# Patient Record
Sex: FEMALE | Race: WHITE | NOT HISPANIC OR LATINO
[De-identification: names, ages, dates, MRNs, and addresses within clinical notes are randomized per-mention and may not be internally consistent; named-entity substitution may affect disease eponyms.]

---

## 2017-01-09 ENCOUNTER — TRANSCRIPTION ENCOUNTER (OUTPATIENT)
Age: 67
End: 2017-01-09

## 2017-01-28 ENCOUNTER — EMERGENCY (EMERGENCY)
Facility: HOSPITAL | Age: 67
LOS: 1 days | Discharge: HOME | End: 2017-01-28
Attending: EMERGENCY MEDICINE | Admitting: EMERGENCY MEDICINE
Payer: MEDICAID

## 2017-01-28 VITALS
OXYGEN SATURATION: 100 % | HEART RATE: 78 BPM | SYSTOLIC BLOOD PRESSURE: 139 MMHG | DIASTOLIC BLOOD PRESSURE: 74 MMHG | TEMPERATURE: 98 F | RESPIRATION RATE: 16 BRPM

## 2017-01-28 LAB
APTT BLD: 30.1 SEC — SIGNIFICANT CHANGE UP (ref 27.5–37.4)
BASOPHILS # BLD AUTO: 0.05 K/UL — SIGNIFICANT CHANGE UP (ref 0–0.2)
BASOPHILS NFR BLD AUTO: 0.5 % — SIGNIFICANT CHANGE UP (ref 0–2)
EOSINOPHIL # BLD AUTO: 0.64 K/UL — HIGH (ref 0–0.5)
EOSINOPHIL NFR BLD AUTO: 6 % — SIGNIFICANT CHANGE UP (ref 0–6)
HCT VFR BLD CALC: 35.9 % — SIGNIFICANT CHANGE UP (ref 34.5–45)
HGB BLD-MCNC: 11.9 G/DL — SIGNIFICANT CHANGE UP (ref 11.5–15.5)
IMM GRANULOCYTES NFR BLD AUTO: 0.3 % — SIGNIFICANT CHANGE UP (ref 0–1.5)
INR BLD: 0.97 — SIGNIFICANT CHANGE UP (ref 0.87–1.18)
LYMPHOCYTES # BLD AUTO: 2.59 K/UL — SIGNIFICANT CHANGE UP (ref 1–3.3)
LYMPHOCYTES # BLD AUTO: 24.2 % — SIGNIFICANT CHANGE UP (ref 13–44)
MCHC RBC-ENTMCNC: 27.9 PG — SIGNIFICANT CHANGE UP (ref 27–34)
MCHC RBC-ENTMCNC: 33.1 % — SIGNIFICANT CHANGE UP (ref 32–36)
MCV RBC AUTO: 84.1 FL — SIGNIFICANT CHANGE UP (ref 80–100)
MONOCYTES # BLD AUTO: 0.89 K/UL — SIGNIFICANT CHANGE UP (ref 0–0.9)
MONOCYTES NFR BLD AUTO: 8.3 % — SIGNIFICANT CHANGE UP (ref 2–14)
NEUTROPHILS # BLD AUTO: 6.52 K/UL — SIGNIFICANT CHANGE UP (ref 1.8–7.4)
NEUTROPHILS NFR BLD AUTO: 60.7 % — SIGNIFICANT CHANGE UP (ref 43–77)
PLATELET # BLD AUTO: 300 K/UL — SIGNIFICANT CHANGE UP (ref 150–400)
PMV BLD: 10.2 FL — SIGNIFICANT CHANGE UP (ref 7–13)
PROTHROM AB SERPL-ACNC: 11 SEC — SIGNIFICANT CHANGE UP (ref 10–13.1)
RBC # BLD: 4.27 M/UL — SIGNIFICANT CHANGE UP (ref 3.8–5.2)
RBC # FLD: 13.3 % — SIGNIFICANT CHANGE UP (ref 10.3–14.5)
WBC # BLD: 10.72 K/UL — HIGH (ref 3.8–10.5)
WBC # FLD AUTO: 10.72 K/UL — HIGH (ref 3.8–10.5)

## 2017-01-28 PROCEDURE — 99285 EMERGENCY DEPT VISIT HI MDM: CPT | Mod: 25

## 2017-01-28 PROCEDURE — 93010 ELECTROCARDIOGRAM REPORT: CPT

## 2017-01-28 RX ORDER — ASPIRIN/CALCIUM CARB/MAGNESIUM 324 MG
325 TABLET ORAL ONCE
Qty: 0 | Refills: 0 | Status: COMPLETED | OUTPATIENT
Start: 2017-01-28 | End: 2017-01-28

## 2017-01-28 RX ADMIN — Medication 325 MILLIGRAM(S): at 23:55

## 2017-01-28 NOTE — ED ADULT NURSE NOTE - OBJECTIVE STATEMENT
65 yo female received in 29.  Pt American speaking.  MD Santiago used  phone with patient.  Pt is A&Ox4.  Pt co of b/l LE swelling and pain beginning on12/18/16.  Pt has been seen at urgent care and ed.  Was put on ABX @ ed and completed prescription.  states pain is constant and does not occur with ambulation.  Pt states increase in difficulty ambulating recently.  Pt appears to ambulate well and ambulated to bed from intake triage.  Pt poor historian of med hx.  Pt has 18g L ac.

## 2017-01-28 NOTE — ED PROVIDER NOTE - MEDICAL DECISION MAKING DETAILS
66F with b/l LE swelling associated with sob, fever, cp. Ddx: dvt/pe, acs, chf, cellulitis, venous insufficiency. Plan: labs, blood cx, ua, ekg, cxr, aspirin, le duplex, reassess.

## 2017-01-28 NOTE — ED PROVIDER NOTE - CARE PLAN
Principal Discharge DX:	Cellulitis of lower extremity  Instructions for follow-up, activity and diet:	-- Follow up with your primary doctor within 2-3 days. Bring a copy of your lab results from today.   -- Please fill the prescription for the antibiotics and take as directed. The prescription is waiting for you at your pharmacy. Please finish the entire course of medication as prescribed.  If you have any belly pain after the antibiotics, yogurt has been shown to help with this.  Do not use any alcohol or grapefruit juice with any antibiotics.  -- See referral list to see a dermatologist in 1-2 weeks.  -- Return to ER immediately for new or worsening symptoms (including but not limited to: worsening pain, fevers, worsening swelling/redness of legs, chest pain or shortness of breath) or for any concerns.  Secondary Diagnosis:	UTI (urinary tract infection)

## 2017-01-28 NOTE — ED PROVIDER NOTE - ATTENDING CONTRIBUTION TO CARE
I have seen and examined the patient face to face, have reviewed and addended the HPI, PE and a/p as necessary. ( see hpi pe and mdm " att" )   65 yo F wit h/o HTN DM here with lower extremity edema itching and erythema. did have subjective fever. mild sob, intermittent cp . no ho pe or dvt. was recently on kelfex no improvement of legs.   att exam: patient awake alert NAD . LUNGS CTAB no wheeze no crackle. CARD RRR no m/r/g.  Abdomen soft NT ND no rebound no guarding no CVA tenderness. EXT WWP no edema no calf tenderness mild bilat leg erythema.  CV 2+DP/PT bilaterally. nuero A&Ox3 gait normal.  skin warm and dry no rash   MDM r/o dvt, chf, cxr ekg labs doppler. reassess.   Cirilli

## 2017-01-28 NOTE — ED PROVIDER NOTE - OBJECTIVE STATEMENT
66F h/o htn, dm p/w b/l LE edema x1 month. The legs are painful, associated with SOB (at rest and with exertion) and intermittent CP, sometimes worse with dep inspiration, and intermittent fevers. Pt seen at outside ED, given 1 week course of keflex which caused itching but did not improve symptoms. No hx of DVT/PE.   Hx obtained via phone  #[id number] 66F h/o htn, dm p/w b/l LE edema x1 month. The legs are painful, associated with SOB (at rest and with exertion) and intermittent CP, sometimes worse with deep inspiration, and intermittent fevers. Pt seen at outside ED, given 1 week course of keflex which caused itching but did not improve symptoms. No hx of DVT/PE. +urinary frequency. No cough, vomiting, diarrhea, or dysuria.  Hx obtained via phone  #[phone] 66F h/o htn, dm p/w b/l LE edema x1 month. The legs are painful, associated with SOB (at rest and with exertion) and intermittent CP, sometimes worse with deep inspiration, and intermittent fevers. Pt seen at outside ED, given 1 week course of keflex which caused itching but did not improve symptoms. No hx of DVT/PE. +urinary frequency. No cough, vomiting, diarrhea, or dysuria.  Pt speaks some English but prefers [place] Zealander. Hx obtained via phone  #[phone]

## 2017-01-28 NOTE — ED PROVIDER NOTE - PROGRESS NOTE DETAILS
LE duplex neg. Do not suspect PE (LE swelling is bilaterally symmetric, Well's = 0). Pt c/o itching of legs and feels that she has bites on legs and hands, though no bites/burrows seen, will give dermatology follow up. LE duplex neg. Do not suspect PE (LE swelling is bilaterally symmetric, Well's = 0). Pt c/o itching of legs and feels that she has bites on legs and hands, though no bites/burrows seen, will give dermatology follow up. Pt with normal knee xrays from [facility] 10 days ago - read official report. Will not pursue xrays at this time.

## 2017-01-28 NOTE — ED PROVIDER NOTE - CONDUCTED A DETAILED DISCUSSION WITH PATIENT AND/OR GUARDIAN REGARDING, MDM
lab results/return to ED if symptoms worsen, persist or questions arise/need for outpatient follow-up/radiology results

## 2017-01-28 NOTE — ED ADULT TRIAGE NOTE - CHIEF COMPLAINT QUOTE
pt with a c/o bilateral LLE edema and sob , with difficulty ambulating. pt denies fever and chest pain. F/S 278  pmhx HTN DM

## 2017-01-28 NOTE — ED PROVIDER NOTE - PLAN OF CARE
-- Follow up with your primary doctor within 2-3 days. Bring a copy of your lab results from today.   -- Please fill the prescription for the antibiotics and take as directed. The prescription is waiting for you at your pharmacy. Please finish the entire course of medication as prescribed.  If you have any belly pain after the antibiotics, yogurt has been shown to help with this.  Do not use any alcohol or grapefruit juice with any antibiotics.  -- See referral list to see a dermatologist in 1-2 weeks.  -- Return to ER immediately for new or worsening symptoms (including but not limited to: worsening pain, fevers, worsening swelling/redness of legs, chest pain or shortness of breath) or for any concerns.

## 2017-01-29 VITALS
OXYGEN SATURATION: 100 % | HEART RATE: 68 BPM | DIASTOLIC BLOOD PRESSURE: 79 MMHG | RESPIRATION RATE: 18 BRPM | SYSTOLIC BLOOD PRESSURE: 161 MMHG

## 2017-01-29 LAB
ALBUMIN SERPL ELPH-MCNC: 3.9 G/DL — SIGNIFICANT CHANGE UP (ref 3.3–5)
ALP SERPL-CCNC: 51 U/L — SIGNIFICANT CHANGE UP (ref 40–120)
ALT FLD-CCNC: 19 U/L — SIGNIFICANT CHANGE UP (ref 4–33)
APPEARANCE UR: CLEAR — SIGNIFICANT CHANGE UP
AST SERPL-CCNC: 20 U/L — SIGNIFICANT CHANGE UP (ref 4–32)
BACTERIA # UR AUTO: SIGNIFICANT CHANGE UP
BILIRUB SERPL-MCNC: 0.2 MG/DL — SIGNIFICANT CHANGE UP (ref 0.2–1.2)
BILIRUB UR-MCNC: NEGATIVE — SIGNIFICANT CHANGE UP
BLOOD UR QL VISUAL: NEGATIVE — SIGNIFICANT CHANGE UP
BUN SERPL-MCNC: 22 MG/DL — SIGNIFICANT CHANGE UP (ref 7–23)
CALCIUM SERPL-MCNC: 9.5 MG/DL — SIGNIFICANT CHANGE UP (ref 8.4–10.5)
CHLORIDE SERPL-SCNC: 99 MMOL/L — SIGNIFICANT CHANGE UP (ref 98–107)
CK MB BLD-MCNC: 4.69 NG/ML — SIGNIFICANT CHANGE UP (ref 1–4.7)
CK SERPL-CCNC: 197 U/L — HIGH (ref 25–170)
CO2 SERPL-SCNC: 25 MMOL/L — SIGNIFICANT CHANGE UP (ref 22–31)
COLOR SPEC: SIGNIFICANT CHANGE UP
CREAT SERPL-MCNC: 0.74 MG/DL — SIGNIFICANT CHANGE UP (ref 0.5–1.3)
GLUCOSE SERPL-MCNC: 310 MG/DL — HIGH (ref 70–99)
GLUCOSE UR-MCNC: >1000 — SIGNIFICANT CHANGE UP
KETONES UR-MCNC: NEGATIVE — SIGNIFICANT CHANGE UP
LEUKOCYTE ESTERASE UR-ACNC: HIGH
MUCOUS THREADS # UR AUTO: SIGNIFICANT CHANGE UP
NITRITE UR-MCNC: NEGATIVE — SIGNIFICANT CHANGE UP
PH UR: 6 — SIGNIFICANT CHANGE UP (ref 4.6–8)
POTASSIUM SERPL-MCNC: 4.4 MMOL/L — SIGNIFICANT CHANGE UP (ref 3.5–5.3)
POTASSIUM SERPL-SCNC: 4.4 MMOL/L — SIGNIFICANT CHANGE UP (ref 3.5–5.3)
PROT SERPL-MCNC: 7 G/DL — SIGNIFICANT CHANGE UP (ref 6–8.3)
PROT UR-MCNC: NEGATIVE — SIGNIFICANT CHANGE UP
RBC CASTS # UR COMP ASSIST: HIGH (ref 0–?)
SODIUM SERPL-SCNC: 141 MMOL/L — SIGNIFICANT CHANGE UP (ref 135–145)
SP GR SPEC: 1.04 — HIGH (ref 1–1.03)
SPECIMEN SOURCE: SIGNIFICANT CHANGE UP
SPECIMEN SOURCE: SIGNIFICANT CHANGE UP
SQUAMOUS # UR AUTO: SIGNIFICANT CHANGE UP
TROPONIN T SERPL-MCNC: < 0.06 NG/ML — SIGNIFICANT CHANGE UP (ref 0–0.06)
UROBILINOGEN FLD QL: NORMAL E.U. — SIGNIFICANT CHANGE UP (ref 0.1–0.2)
WBC CLUMPS #/AREA URNS HPF: PRESENT — HIGH (ref 0–?)
WBC UR QL: SIGNIFICANT CHANGE UP (ref 0–?)

## 2017-01-29 PROCEDURE — 71010: CPT | Mod: 26

## 2017-01-29 PROCEDURE — 93970 EXTREMITY STUDY: CPT | Mod: 26

## 2017-01-29 RX ADMIN — Medication 2 TABLET(S): at 02:06

## 2017-01-31 ENCOUNTER — APPOINTMENT (OUTPATIENT)
Dept: DERMATOLOGY | Facility: CLINIC | Age: 67
End: 2017-01-31

## 2017-01-31 VITALS
WEIGHT: 250 LBS | BODY MASS INDEX: 42.16 KG/M2 | HEIGHT: 64.5 IN | SYSTOLIC BLOOD PRESSURE: 122 MMHG | DIASTOLIC BLOOD PRESSURE: 70 MMHG

## 2017-01-31 DIAGNOSIS — I10 ESSENTIAL (PRIMARY) HYPERTENSION: ICD-10-CM

## 2017-01-31 DIAGNOSIS — Z78.9 OTHER SPECIFIED HEALTH STATUS: ICD-10-CM

## 2017-01-31 DIAGNOSIS — L30.9 DERMATITIS, UNSPECIFIED: ICD-10-CM

## 2017-01-31 DIAGNOSIS — Z80.8 FAMILY HISTORY OF MALIGNANT NEOPLASM OF OTHER ORGANS OR SYSTEMS: ICD-10-CM

## 2017-01-31 DIAGNOSIS — I83.11 VARICOSE VEINS OF RIGHT LOWER EXTREMITY WITH INFLAMMATION: ICD-10-CM

## 2017-01-31 DIAGNOSIS — L80 VITILIGO: ICD-10-CM

## 2017-01-31 DIAGNOSIS — E11.9 TYPE 2 DIABETES MELLITUS W/OUT COMPLICATIONS: ICD-10-CM

## 2017-01-31 DIAGNOSIS — I83.12 VARICOSE VEINS OF RIGHT LOWER EXTREMITY WITH INFLAMMATION: ICD-10-CM

## 2017-01-31 DIAGNOSIS — L81.9 DISORDER OF PIGMENTATION, UNSPECIFIED: ICD-10-CM

## 2017-01-31 RX ORDER — LIDOCAINE 5 G/100G
5 OINTMENT TOPICAL
Refills: 0 | Status: ACTIVE | COMMUNITY

## 2017-01-31 RX ORDER — KETOCONAZOLE 20 MG/G
2 CREAM TOPICAL
Qty: 1 | Refills: 2 | Status: ACTIVE | COMMUNITY
Start: 2017-01-31 | End: 1900-01-01

## 2017-01-31 RX ORDER — BACITRACIN 500 [USP'U]/G
OINTMENT OPHTHALMIC
Refills: 0 | Status: ACTIVE | COMMUNITY

## 2017-01-31 RX ORDER — CAMPHOR AND MENTHOL 5; 5 MG/ML; MG/ML
LOTION TOPICAL
Refills: 0 | Status: ACTIVE | COMMUNITY

## 2017-01-31 RX ORDER — HYDROCORTISONE 1 %
12 CREAM (GRAM) TOPICAL
Refills: 0 | Status: ACTIVE | COMMUNITY

## 2017-01-31 RX ORDER — CETIRIZINE HYDROCHLORIDE 10 MG/1
10 TABLET, COATED ORAL
Refills: 0 | Status: ACTIVE | COMMUNITY

## 2017-01-31 RX ORDER — TRIAMCINOLONE ACETONIDE 1 MG/G
0.1 OINTMENT TOPICAL
Qty: 1 | Refills: 0 | Status: ACTIVE | COMMUNITY
Start: 2017-01-31 | End: 1900-01-01

## 2017-02-02 ENCOUNTER — APPOINTMENT (OUTPATIENT)
Dept: DERMATOLOGY | Facility: CLINIC | Age: 67
End: 2017-02-02

## 2017-02-02 LAB
BACTERIA BLD CULT: SIGNIFICANT CHANGE UP
BACTERIA BLD CULT: SIGNIFICANT CHANGE UP

## 2017-02-06 NOTE — ED PROVIDER NOTE - SKIN, MLM
Ejection Fraction...
Skin normal color for race, warm, dry and intact. b/l LE venous insufficiency changes, no obvious cellulitis

## 2017-02-08 ENCOUNTER — APPOINTMENT (OUTPATIENT)
Dept: PHYSICAL MEDICINE AND REHAB | Facility: CLINIC | Age: 67
End: 2017-02-08

## 2017-02-16 ENCOUNTER — EMERGENCY (EMERGENCY)
Facility: HOSPITAL | Age: 67
LOS: 1 days | Discharge: HOME | End: 2017-02-16
Attending: EMERGENCY MEDICINE
Payer: MEDICAID

## 2017-02-16 VITALS
TEMPERATURE: 98 F | HEART RATE: 80 BPM | SYSTOLIC BLOOD PRESSURE: 150 MMHG | RESPIRATION RATE: 18 BRPM | OXYGEN SATURATION: 97 % | DIASTOLIC BLOOD PRESSURE: 74 MMHG

## 2017-02-16 DIAGNOSIS — Z88.0 ALLERGY STATUS TO PENICILLIN: ICD-10-CM

## 2017-02-16 DIAGNOSIS — M62.838 OTHER MUSCLE SPASM: ICD-10-CM

## 2017-02-16 DIAGNOSIS — Z88.1 ALLERGY STATUS TO OTHER ANTIBIOTIC AGENTS STATUS: ICD-10-CM

## 2017-02-16 DIAGNOSIS — M54.9 DORSALGIA, UNSPECIFIED: ICD-10-CM

## 2017-02-16 PROCEDURE — 99283 EMERGENCY DEPT VISIT LOW MDM: CPT

## 2017-02-16 RX ORDER — IBUPROFEN 200 MG
600 TABLET ORAL ONCE
Qty: 0 | Refills: 0 | Status: COMPLETED | OUTPATIENT
Start: 2017-02-16 | End: 2017-02-16

## 2017-02-16 RX ORDER — IBUPROFEN 200 MG
1 TABLET ORAL
Qty: 28 | Refills: 0 | OUTPATIENT
Start: 2017-02-16 | End: 2017-02-23

## 2017-02-16 RX ORDER — METHOCARBAMOL 500 MG/1
1 TABLET, FILM COATED ORAL
Qty: 21 | Refills: 0 | OUTPATIENT
Start: 2017-02-16 | End: 2017-02-23

## 2017-02-16 RX ORDER — METHOCARBAMOL 500 MG/1
750 TABLET, FILM COATED ORAL ONCE
Qty: 0 | Refills: 0 | Status: COMPLETED | OUTPATIENT
Start: 2017-02-16 | End: 2017-02-16

## 2017-02-16 RX ADMIN — Medication 600 MILLIGRAM(S): at 15:01

## 2017-02-16 RX ADMIN — METHOCARBAMOL 750 MILLIGRAM(S): 500 TABLET, FILM COATED ORAL at 14:30

## 2017-02-16 RX ADMIN — Medication 600 MILLIGRAM(S): at 14:31

## 2017-02-16 NOTE — ED PROVIDER NOTE - CHIEF COMPLAINT
The patient is a 66y Female complaining of pain, lower leg.no ppt factors co of recurrent lower back pain/muscle spasm.

## 2017-03-01 ENCOUNTER — EMERGENCY (EMERGENCY)
Facility: HOSPITAL | Age: 67
LOS: 1 days | Discharge: HOME | End: 2017-03-01
Attending: EMERGENCY MEDICINE
Payer: MEDICAID

## 2017-03-01 VITALS
TEMPERATURE: 98 F | SYSTOLIC BLOOD PRESSURE: 110 MMHG | DIASTOLIC BLOOD PRESSURE: 70 MMHG | RESPIRATION RATE: 17 BRPM | OXYGEN SATURATION: 98 % | HEART RATE: 82 BPM

## 2017-03-01 VITALS
DIASTOLIC BLOOD PRESSURE: 68 MMHG | TEMPERATURE: 98 F | HEIGHT: 68 IN | SYSTOLIC BLOOD PRESSURE: 102 MMHG | RESPIRATION RATE: 18 BRPM | HEART RATE: 80 BPM | WEIGHT: 169.98 LBS | OXYGEN SATURATION: 97 %
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
## 2017-03-01 DIAGNOSIS — M25.531 PAIN IN RIGHT WRIST: ICD-10-CM

## 2017-03-01 DIAGNOSIS — M25.532 PAIN IN LEFT WRIST: ICD-10-CM

## 2017-03-01 DIAGNOSIS — I10 ESSENTIAL (PRIMARY) HYPERTENSION: ICD-10-CM
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
## 2017-03-01 DIAGNOSIS — E11.9 TYPE 2 DIABETES MELLITUS WITHOUT COMPLICATIONS: ICD-10-CM

## 2017-03-01 DIAGNOSIS — M25.561 PAIN IN RIGHT KNEE: ICD-10-CM

## 2017-03-01 DIAGNOSIS — M25.551 PAIN IN RIGHT HIP: ICD-10-CM

## 2017-03-01 PROCEDURE — 73562 X-RAY EXAM OF KNEE 3: CPT

## 2017-03-01 PROCEDURE — 73502 X-RAY EXAM HIP UNI 2-3 VIEWS: CPT | Mod: 26,RT

## 2017-03-01 PROCEDURE — 73502 X-RAY EXAM HIP UNI 2-3 VIEWS: CPT

## 2017-03-01 PROCEDURE — 99284 EMERGENCY DEPT VISIT MOD MDM: CPT

## 2017-03-01 PROCEDURE — 73110 X-RAY EXAM OF WRIST: CPT | Mod: 26,50

## 2017-03-01 PROCEDURE — 73562 X-RAY EXAM OF KNEE 3: CPT | Mod: 26,RT

## 2017-03-01 PROCEDURE — 73110 X-RAY EXAM OF WRIST: CPT

## 2017-03-01 RX ORDER — ACETAMINOPHEN 500 MG
975 TABLET ORAL ONCE
Qty: 0 | Refills: 0 | Status: COMPLETED | OUTPATIENT
Start: 2017-03-01 | End: 2017-03-01

## 2017-03-01 RX ORDER — IBUPROFEN 200 MG
600 TABLET ORAL ONCE
Qty: 0 | Refills: 0 | Status: COMPLETED | OUTPATIENT
Start: 2017-03-01 | End: 2017-03-01

## 2017-03-01 RX ADMIN — Medication 600 MILLIGRAM(S): at 22:38

## 2017-03-01 RX ADMIN — Medication 975 MILLIGRAM(S): at 22:38

## 2017-03-01 NOTE — ED PROVIDER NOTE - MEDICAL DECISION MAKING DETAILS
66 YOF BIBA s/p mech fall. analgesia, radiographs. 66 [facility]. no obvious acute traumatic injuries on radiographs. discussed with pt and family that they will be contacted if there are any discrepancies from the radiologist read. Pt able to ambulate without assistance. discussed progression of pain over the next few days and expectant management. Pt is well appearing, stable for discharge and follow up without fail with medical doctor. I had a detailed discussion with the patient and/or guardian regarding the historical points, exam findings, and any diagnostic results supporting the discharge diagnosis. Pt educated on care and need for follow up. Strict return instructions and red flag signs and symptoms discussed with patient. Medications for discharge discussed and adverse effects reviewed. Questions answered. Pt shows understanding of discharge information and agrees to follow.

## 2017-03-01 NOTE — ED PROVIDER NOTE - CARE PLAN
Principal Discharge DX:	Fall, initial encounter  Secondary Diagnosis:	Wrist pain, acute, left  Secondary Diagnosis:	Wrist pain, acute, right

## 2017-03-01 NOTE — ED PROVIDER NOTE - OBJECTIVE STATEMENT
66 YOF BIBA s/p trip and fall while walking this evening. states that she fell forward. States broke her fall with B hands and then fell onto the R knee and Hip. Denies head trauma. denies LOC 66 YOF BIBA s/p trip and fall while walking this evening. states that she fell forward. States broke her fall with B hands and then fell onto the R knee and Hip. Denies head trauma. denies LOC, CP, sob, abd pain, n/v/d. Interpretation done by adult son

## 2017-03-01 NOTE — ED PROVIDER NOTE - PHYSICAL EXAMINATION
GEN: WD, WN, NAD. Non-toxic appearance, obese, GCS: 15  HEENT: NC, AT, MMM, External ears normal, nares patent no drainage,  PERRLA, EOMI, conjunctiva clear, no scleral icterus, No khan's sign, No Raccoon eyes  NECK: FROM, No meningismus, Trachea midline, No vertebral tenderness  CV: Regular rate, regular rhythm. Normal S1/2. No M/R/G  PULM: Normal respiratory effort. CTA-B. No C/W/R, No chest wall tenderness  ABD: soft, ND, NT, no pulsatile masses  SKIN: Warm/dry, no rash, no lacerations, no abrasions, no ecchymosis  BACK: No midline vertebral tenderness, no CVA tenderness  MSK/EXT: FROM x4. distal pulses intact and palpable, No deformities/effusions, No edema/cyanosis, L proximal palm with punctate ecchymosis, No snuff box tenderness B, B wrist mild diffuse tenderness. R Knee: tender infra patellar, no ligamentous laxity, R greater troch tender. no limb length discrepancy.  NEURO: A&O, CN intact, GALDAMEZ, equal and appropriate strength, SILTx4, no focal deficits noted, able to stand and ambulate  PSYCH: Appropriate Affect, judgment, mood, and insight

## 2017-03-14 ENCOUNTER — APPOINTMENT (OUTPATIENT)
Dept: DERMATOLOGY | Facility: CLINIC | Age: 67
End: 2017-03-14

## 2017-03-14 ENCOUNTER — INPATIENT (INPATIENT)
Facility: HOSPITAL | Age: 67
LOS: 2 days | Discharge: HOME HEALTH | DRG: 73 | End: 2017-03-17
Attending: GENERAL PRACTICE | Admitting: GENERAL PRACTICE
Payer: MEDICAID

## 2017-03-14 ENCOUNTER — TRANSCRIPTION ENCOUNTER (OUTPATIENT)
Age: 67
End: 2017-03-14

## 2017-03-14 VITALS
HEART RATE: 86 BPM | SYSTOLIC BLOOD PRESSURE: 142 MMHG | WEIGHT: 199.96 LBS | RESPIRATION RATE: 16 BRPM | OXYGEN SATURATION: 98 % | HEIGHT: 67 IN | TEMPERATURE: 98 F | DIASTOLIC BLOOD PRESSURE: 90 MMHG

## 2017-03-14 DIAGNOSIS — E11.9 TYPE 2 DIABETES MELLITUS WITHOUT COMPLICATIONS: ICD-10-CM

## 2017-03-14 DIAGNOSIS — I10 ESSENTIAL (PRIMARY) HYPERTENSION: ICD-10-CM

## 2017-03-14 DIAGNOSIS — I24.9 ACUTE ISCHEMIC HEART DISEASE, UNSPECIFIED: ICD-10-CM

## 2017-03-14 DIAGNOSIS — R07.9 CHEST PAIN, UNSPECIFIED: ICD-10-CM

## 2017-03-14 DIAGNOSIS — M79.606 PAIN IN LEG, UNSPECIFIED: ICD-10-CM

## 2017-03-14 DIAGNOSIS — I82.409 ACUTE EMBOLISM AND THROMBOSIS OF UNSPECIFIED DEEP VEINS OF UNSPECIFIED LOWER EXTREMITY: ICD-10-CM

## 2017-03-14 LAB
ALBUMIN SERPL ELPH-MCNC: 3 G/DL — LOW (ref 3.5–5)
ALP SERPL-CCNC: 57 U/L — SIGNIFICANT CHANGE UP (ref 40–120)
ALT FLD-CCNC: 27 U/L DA — SIGNIFICANT CHANGE UP (ref 10–60)
ANION GAP SERPL CALC-SCNC: 8 MMOL/L — SIGNIFICANT CHANGE UP (ref 5–17)
APTT BLD: 32.8 SEC — SIGNIFICANT CHANGE UP (ref 27.5–37.4)
AST SERPL-CCNC: 17 U/L — SIGNIFICANT CHANGE UP (ref 10–40)
BASOPHILS # BLD AUTO: 0.1 K/UL — SIGNIFICANT CHANGE UP (ref 0–0.2)
BASOPHILS NFR BLD AUTO: 0.9 % — SIGNIFICANT CHANGE UP (ref 0–2)
BILIRUB SERPL-MCNC: 0.2 MG/DL — SIGNIFICANT CHANGE UP (ref 0.2–1.2)
BUN SERPL-MCNC: 19 MG/DL — HIGH (ref 7–18)
CALCIUM SERPL-MCNC: 8.4 MG/DL — SIGNIFICANT CHANGE UP (ref 8.4–10.5)
CHLORIDE SERPL-SCNC: 107 MMOL/L — SIGNIFICANT CHANGE UP (ref 96–108)
CHOLEST SERPL-MCNC: 126 MG/DL — SIGNIFICANT CHANGE UP (ref 10–199)
CK MB BLD-MCNC: 1.6 % — SIGNIFICANT CHANGE UP (ref 0–3.5)
CK MB CFR SERPL CALC: 3.3 NG/ML — SIGNIFICANT CHANGE UP (ref 0–3.6)
CK SERPL-CCNC: 203 U/L — SIGNIFICANT CHANGE UP (ref 21–215)
CK SERPL-CCNC: 230 U/L — HIGH (ref 21–215)
CO2 SERPL-SCNC: 26 MMOL/L — SIGNIFICANT CHANGE UP (ref 22–31)
CREAT SERPL-MCNC: 0.61 MG/DL — SIGNIFICANT CHANGE UP (ref 0.5–1.3)
D DIMER BLD IA.RAPID-MCNC: 188 NG/ML DDU — SIGNIFICANT CHANGE UP
EOSINOPHIL # BLD AUTO: 0.9 K/UL — HIGH (ref 0–0.5)
EOSINOPHIL NFR BLD AUTO: 8.3 % — HIGH (ref 0–6)
GLUCOSE SERPL-MCNC: 225 MG/DL — HIGH (ref 70–99)
HBA1C BLD-MCNC: 9.7 % — HIGH (ref 4–5.6)
HCT VFR BLD CALC: 32.8 % — LOW (ref 34.5–45)
HDLC SERPL-MCNC: 55 MG/DL — SIGNIFICANT CHANGE UP (ref 40–125)
HGB BLD-MCNC: 10.9 G/DL — LOW (ref 11.5–15.5)
INR BLD: 1.12 RATIO — SIGNIFICANT CHANGE UP (ref 0.88–1.16)
LIDOCAIN IGE QN: 140 U/L — SIGNIFICANT CHANGE UP (ref 73–393)
LIPID PNL WITH DIRECT LDL SERPL: 57 MG/DL — SIGNIFICANT CHANGE UP
LYMPHOCYTES # BLD AUTO: 2.6 K/UL — SIGNIFICANT CHANGE UP (ref 1–3.3)
LYMPHOCYTES # BLD AUTO: 24.9 % — SIGNIFICANT CHANGE UP (ref 13–44)
MCHC RBC-ENTMCNC: 28 PG — SIGNIFICANT CHANGE UP (ref 27–34)
MCHC RBC-ENTMCNC: 33.4 GM/DL — SIGNIFICANT CHANGE UP (ref 32–36)
MCV RBC AUTO: 84 FL — SIGNIFICANT CHANGE UP (ref 80–100)
MONOCYTES # BLD AUTO: 0.9 K/UL — SIGNIFICANT CHANGE UP (ref 0–0.9)
MONOCYTES NFR BLD AUTO: 8.9 % — SIGNIFICANT CHANGE UP (ref 2–14)
NEUTROPHILS # BLD AUTO: 5.9 K/UL — SIGNIFICANT CHANGE UP (ref 1.8–7.4)
NEUTROPHILS NFR BLD AUTO: 57 % — SIGNIFICANT CHANGE UP (ref 43–77)
NT-PROBNP SERPL-SCNC: 175 PG/ML — HIGH (ref 0–125)
PLATELET # BLD AUTO: 281 K/UL — SIGNIFICANT CHANGE UP (ref 150–400)
POTASSIUM SERPL-MCNC: 4.3 MMOL/L — SIGNIFICANT CHANGE UP (ref 3.5–5.3)
POTASSIUM SERPL-SCNC: 4.3 MMOL/L — SIGNIFICANT CHANGE UP (ref 3.5–5.3)
PROT SERPL-MCNC: 6.5 G/DL — SIGNIFICANT CHANGE UP (ref 6–8.3)
PROTHROM AB SERPL-ACNC: 12.6 SEC — SIGNIFICANT CHANGE UP (ref 10–13.1)
RBC # BLD: 3.9 M/UL — SIGNIFICANT CHANGE UP (ref 3.8–5.2)
RBC # FLD: 12 % — SIGNIFICANT CHANGE UP (ref 10.3–14.5)
SODIUM SERPL-SCNC: 141 MMOL/L — SIGNIFICANT CHANGE UP (ref 135–145)
TOTAL CHOLESTEROL/HDL RATIO MEASUREMENT: 2.3 RATIO — LOW (ref 3.3–7.1)
TRIGL SERPL-MCNC: 71 MG/DL — SIGNIFICANT CHANGE UP (ref 10–149)
TROPONIN I SERPL-MCNC: <0.015 NG/ML — SIGNIFICANT CHANGE UP (ref 0–0.04)
TROPONIN I SERPL-MCNC: <0.015 NG/ML — SIGNIFICANT CHANGE UP (ref 0–0.04)
WBC # BLD: 10.4 K/UL — SIGNIFICANT CHANGE UP (ref 3.8–10.5)
WBC # FLD AUTO: 10.4 K/UL — SIGNIFICANT CHANGE UP (ref 3.8–10.5)

## 2017-03-14 PROCEDURE — 93970 EXTREMITY STUDY: CPT | Mod: 26

## 2017-03-14 PROCEDURE — 99285 EMERGENCY DEPT VISIT HI MDM: CPT

## 2017-03-14 PROCEDURE — 71010: CPT | Mod: 26

## 2017-03-14 PROCEDURE — 71275 CT ANGIOGRAPHY CHEST: CPT | Mod: 26

## 2017-03-14 RX ORDER — ATORVASTATIN CALCIUM 80 MG/1
20 TABLET, FILM COATED ORAL AT BEDTIME
Qty: 0 | Refills: 0 | Status: DISCONTINUED | OUTPATIENT
Start: 2017-03-14 | End: 2017-03-17

## 2017-03-14 RX ORDER — ASPIRIN/CALCIUM CARB/MAGNESIUM 324 MG
81 TABLET ORAL ONCE
Qty: 0 | Refills: 0 | Status: COMPLETED | OUTPATIENT
Start: 2017-03-14 | End: 2017-03-14

## 2017-03-14 RX ORDER — GABAPENTIN 400 MG/1
100 CAPSULE ORAL EVERY 12 HOURS
Qty: 0 | Refills: 0 | Status: DISCONTINUED | OUTPATIENT
Start: 2017-03-14 | End: 2017-03-14

## 2017-03-14 RX ORDER — MAGNESIUM SULFATE 500 MG/ML
1 VIAL (ML) INJECTION ONCE
Qty: 0 | Refills: 0 | Status: COMPLETED | OUTPATIENT
Start: 2017-03-14 | End: 2017-03-14

## 2017-03-14 RX ORDER — NITROGLYCERIN 6.5 MG
0.4 CAPSULE, EXTENDED RELEASE ORAL
Qty: 0 | Refills: 0 | Status: DISCONTINUED | OUTPATIENT
Start: 2017-03-14 | End: 2017-03-17

## 2017-03-14 RX ORDER — GABAPENTIN 400 MG/1
100 CAPSULE ORAL THREE TIMES A DAY
Qty: 0 | Refills: 0 | Status: DISCONTINUED | OUTPATIENT
Start: 2017-03-14 | End: 2017-03-15

## 2017-03-14 RX ORDER — HYDRALAZINE HCL 50 MG
10 TABLET ORAL ONCE
Qty: 0 | Refills: 0 | Status: COMPLETED | OUTPATIENT
Start: 2017-03-14 | End: 2017-03-14

## 2017-03-14 RX ORDER — INSULIN NPH HUM/REG INSULIN HM 70-30/ML
10 VIAL (ML) SUBCUTANEOUS
Qty: 0 | Refills: 0 | Status: DISCONTINUED | OUTPATIENT
Start: 2017-03-14 | End: 2017-03-15

## 2017-03-14 RX ORDER — CARVEDILOL PHOSPHATE 80 MG/1
12.5 CAPSULE, EXTENDED RELEASE ORAL EVERY 12 HOURS
Qty: 0 | Refills: 0 | Status: DISCONTINUED | OUTPATIENT
Start: 2017-03-14 | End: 2017-03-17

## 2017-03-14 RX ORDER — INSULIN LISPRO 100/ML
VIAL (ML) SUBCUTANEOUS
Qty: 0 | Refills: 0 | Status: DISCONTINUED | OUTPATIENT
Start: 2017-03-14 | End: 2017-03-17

## 2017-03-14 RX ORDER — ASPIRIN/CALCIUM CARB/MAGNESIUM 324 MG
81 TABLET ORAL DAILY
Qty: 0 | Refills: 0 | Status: DISCONTINUED | OUTPATIENT
Start: 2017-03-14 | End: 2017-03-17

## 2017-03-14 RX ORDER — MORPHINE SULFATE 50 MG/1
4 CAPSULE, EXTENDED RELEASE ORAL ONCE
Qty: 0 | Refills: 0 | Status: DISCONTINUED | OUTPATIENT
Start: 2017-03-14 | End: 2017-03-14

## 2017-03-14 RX ORDER — KETOROLAC TROMETHAMINE 30 MG/ML
30 SYRINGE (ML) INJECTION ONCE
Qty: 0 | Refills: 0 | Status: DISCONTINUED | OUTPATIENT
Start: 2017-03-14 | End: 2017-03-14

## 2017-03-14 RX ORDER — LOSARTAN POTASSIUM 100 MG/1
100 TABLET, FILM COATED ORAL DAILY
Qty: 0 | Refills: 0 | Status: DISCONTINUED | OUTPATIENT
Start: 2017-03-14 | End: 2017-03-17

## 2017-03-14 RX ORDER — SODIUM CHLORIDE 9 MG/ML
3 INJECTION INTRAMUSCULAR; INTRAVENOUS; SUBCUTANEOUS ONCE
Qty: 0 | Refills: 0 | Status: COMPLETED | OUTPATIENT
Start: 2017-03-14 | End: 2017-03-14

## 2017-03-14 RX ORDER — INSULIN GLARGINE 100 [IU]/ML
24 INJECTION, SOLUTION SUBCUTANEOUS AT BEDTIME
Qty: 0 | Refills: 0 | Status: DISCONTINUED | OUTPATIENT
Start: 2017-03-14 | End: 2017-03-16

## 2017-03-14 RX ORDER — ENOXAPARIN SODIUM 100 MG/ML
40 INJECTION SUBCUTANEOUS DAILY
Qty: 0 | Refills: 0 | Status: DISCONTINUED | OUTPATIENT
Start: 2017-03-14 | End: 2017-03-17

## 2017-03-14 RX ADMIN — CARVEDILOL PHOSPHATE 12.5 MILLIGRAM(S): 80 CAPSULE, EXTENDED RELEASE ORAL at 11:54

## 2017-03-14 RX ADMIN — Medication 100 GRAM(S): at 08:53

## 2017-03-14 RX ADMIN — ENOXAPARIN SODIUM 40 MILLIGRAM(S): 100 INJECTION SUBCUTANEOUS at 13:09

## 2017-03-14 RX ADMIN — INSULIN GLARGINE 24 UNIT(S): 100 INJECTION, SOLUTION SUBCUTANEOUS at 21:36

## 2017-03-14 RX ADMIN — SODIUM CHLORIDE 3 MILLILITER(S): 9 INJECTION INTRAMUSCULAR; INTRAVENOUS; SUBCUTANEOUS at 02:45

## 2017-03-14 RX ADMIN — Medication 3: at 17:40

## 2017-03-14 RX ADMIN — Medication 10 UNIT(S): at 12:11

## 2017-03-14 RX ADMIN — CARVEDILOL PHOSPHATE 12.5 MILLIGRAM(S): 80 CAPSULE, EXTENDED RELEASE ORAL at 20:42

## 2017-03-14 RX ADMIN — Medication 81 MILLIGRAM(S): at 02:49

## 2017-03-14 RX ADMIN — Medication 81 MILLIGRAM(S): at 11:55

## 2017-03-14 NOTE — ED ADULT NURSE NOTE - OBJECTIVE STATEMENT
Patient complain of B/L LE swelling and pain R>L Patient complain of B/L LE swelling and pain R>L  PT WITH B/L LE SWELLING AND REDNESS NOTED .

## 2017-03-14 NOTE — H&P ADULT. - PROBLEM SELECTOR PLAN 1
Atypical chest pain   2 set of cardiac enzyme negative   EKG showed normal sinus rhythm , no significant ST or T wave changes   CTA was done in Ed, showed No central pulmonary embolism. Lobar, segmental, subsegmental pulmonary   arteries cannot be evaluated due to incomplete opacification.  5 mm right lower lobe pulmonary nodule. Follow up as out patient   Continue with aspirin , statin , and beta blocker   Follow up Echo   Cardio Dr. Martinez

## 2017-03-14 NOTE — ED PROVIDER NOTE - MUSCULOSKELETAL MINIMAL EXAM
no muscle tenderness/atraumatic/R lower anterior mild erythema with slightly increased warmth. 2+ DP pulse. Normal sensation and function. Mild TTP//normal range of motion

## 2017-03-14 NOTE — H&P ADULT. - PROBLEM SELECTOR PLAN 4
Patient is on variable insulin dosage at hoem   will continue with humalog 70/30  10 units in am as per son with lantus 24 U at night   Continue with HSS   Follow up HbA1c

## 2017-03-14 NOTE — DISCHARGE NOTE ADULT - PLAN OF CARE
maintain blood sugars within normal range. On variable insulin dosage at home. HbA1c 9.7. Endo Dr. Gann consulted. Recommended changes in insulin regimen. Discontinued Humulin 70/30 and added Humalog pre meals, continued Lantus. Monitored accu cheks and adjusted the insulin regimen. Take 28 units of Lantus at bedtime and 6 units Humalog pre meals. Monitor blood sugars daily at home, continue follow up with Endocrinologist as out patient and adjust regimen as needed. Eat diet low in complex carbohydrates. Weight control and exercise. maintain BP < 140/90 mm hg. Continue home medications and follow up with PCP and Cardiologist as outpatient. Monitor BP. Eat diet low in salt. Continue Gabapentin at increased dose. Keep blood sugars under control. Follow up with PCP routinely. Monitored on telemetry for atypical chest pain, 2 set of cardiac enzyme negative.  EKG showed normal sinus rhythm , no significant ST or T wave changes. CT Angio Chest was done in ED, showed no central pulmonary embolism. Lobar, segmental, subsegmental pulmonary arteries cannot be evaluated due to incomplete opacification. 5 mm right lower lobe pulmonary nodule. Recommended follow up as out patient.    ECHO : normal LV function, mild pulmonary HTN. Evaluated by Cardiologist.    Continue medications as mentioned and follow up with PCP and Cardiologist as outpatient routinely. prevent worsening resolution Monitored on telemetry for atypical chest pain, 2 set of cardiac enzyme negative.  EKG showed normal sinus rhythm , no significant ST or T wave changes. CT Angio Chest was done in ED, showed no central pulmonary embolism. Lobar, segmental, subsegmental pulmonary arteries cannot be evaluated due to incomplete opacification. 5 mm right lower lobe pulmonary nodule. Recommended follow up as out patient.    ECHO : normal LV function, mild pulmonary HTN. Evaluated by Cardiologist.    HbA1c of 9.7. Lipid panel was within normal limit.   Continue medications as mentioned and follow up with PCP and Cardiologist as outpatient routinely.

## 2017-03-14 NOTE — H&P ADULT. - HISTORY OF PRESENT ILLNESS
66 F from home, PMH of DM, HTN presented to ED with right leg pain and chest pain. As per patient she has chronic leg pain , burning pain in her right leg , also noticed swelling over leg. She also c/o left sided chest pain, squeezing chest pain for 2-3 days , no radiation , no relationship to exertion , denied any previous cardiac workup , recently started on nitroglycerin by PMD. Patient also has prescription for gabapentin but has not been taking as per son. Patient is poor historian and complaints of pain all over body.  ROS negative for headache, dizziness, abdominal pain, constipation , diarrhea, urinary pain. 66 F from home, PMH of DM, HTN presented to ED with right leg pain and chest pain. As per patient she has chronic leg pain , burning pain in her right leg , also noticed swelling over leg.   She also c/o left sided chest pain, squeezing chest pain for 2-3 days , no radiation , no relationship to exertion , denied any previous cardiac workup , recently started on nitroglycerin by PMD (Patch, used and "finished" 2 days ago).   Patient also has prescription for gabapentin but has not been taking as per son. Patient is poor historian and complaints of pain all over body.  ROS negative for headache, dizziness, abdominal pain, constipation , diarrhea, urinary pain.

## 2017-03-14 NOTE — DISCHARGE NOTE ADULT - MEDICATION SUMMARY - MEDICATIONS TO TAKE
I will START or STAY ON the medications listed below when I get home from the hospital:    aspirin 81 mg oral tablet  -- 1 tab(s) by mouth once a day  -- Indication: For Cardioprotective    gabapentin 100 mg oral tablet  -- 2 cap(s) by mouth 2 times a day , in am and noon and 4 tabs at bedtime.   -- Indication: For Diabetic neuropathy    Tresiba FlexTouch 100 units/mL subcutaneous solution  -- 28 unit(s) subcutaneous once a day (at bedtime)  -- Indication: For Diabetes mellitus    HumaLOG KwikPen 100 units/mL subcutaneous solution  -- 6 unit(s) subcutaneous 3 times a day (before meals)  -- Do not drink alcoholic beverages when taking this medication.  It is very important that you take or use this exactly as directed.  Do not skip doses or discontinue unless directed by your doctor.  Keep in refrigerator.  Do not freeze.    -- Indication: For Diabetes mellitus    irbesartan-hydroCHLOROthiazide 300mg-12.5mg oral tablet  -- 1 tab(s) by mouth once a day  -- Indication: For HTN (hypertension)    carvedilol 12.5 mg oral tablet  -- 1 tab(s) by mouth 2 times a day  -- Indication: For HTN (hypertension)

## 2017-03-14 NOTE — ED ADULT NURSE REASSESSMENT NOTE - NS ED NURSE REASSESS COMMENT FT1
pt report given to Ms Hinojosa
Patient resting in bed, pain B/L LE R>L upon activity. Patient refuse morphine, toradol offered, refused as well. Dr. Rg notified. On Telebox B. Will monitor closely.

## 2017-03-14 NOTE — DISCHARGE NOTE ADULT - MEDICATION SUMMARY - MEDICATIONS TO STOP TAKING
I will STOP taking the medications listed below when I get home from the hospital:    NovoLOG Mix 70/30 FlexPen subcutaneous suspension  -- 10 unit(s)-30 U subcutaneous in am

## 2017-03-14 NOTE — H&P ADULT. - ASSESSMENT
66 F from home, PMH of DM, HTN presented to ED with right leg pain and chest pain. As per patient she has chronic leg pain , burning pain in her right leg , also noticed swelling over leg. She also c/o left sided chest pain, squeezing chest pain for 2-3 days , no radiation , no relationship to exertion , denied any previous cardiac workup , recently started on nitroglycerin by PMD. Patient also has prescription for gabapentin but has not been taking as per son. Patient is poor historian and complaints of pain all over body.

## 2017-03-14 NOTE — H&P ADULT. - PROBLEM SELECTOR PLAN 2
likely from diabetic neuropathy   Will increase gabapentin to 100mg BID   Will get doppler venous to rule out DVT

## 2017-03-14 NOTE — DISCHARGE NOTE ADULT - PATIENT PORTAL LINK FT
“You can access the [facility] Patient Portal, offered by [facility], by registering with the following website: [email or web address]”

## 2017-03-14 NOTE — ED PROVIDER NOTE - MEDICAL DECISION MAKING DETAILS
65 y/o F pt presents to the ED for RLE pain with chest pain. R/o DVT, PE, and cellulitis. will check labs and CTA chest. Give IV, analgesics, and possible admission.

## 2017-03-14 NOTE — ED PROVIDER NOTE - NS ED MD SCRIBE ATTENDING SCRIBE SECTIONS
HISTORY OF PRESENT ILLNESS/PAST MEDICAL/SURGICAL/SOCIAL HISTORY/PHYSICAL EXAM/DISPOSITION/REVIEW OF SYSTEMS/VITAL SIGNS( Pullset)

## 2017-03-14 NOTE — ED PROVIDER NOTE - OBJECTIVE STATEMENT
65 y/o F pt w/ PMHx of DM and HTN presents to the ED for R lower leg pain today. Pt also notes swelling to her leg, associated mild chest pain, and shaking chills. Pt describes the pain as throbbing and severe. Pt states she fell 8 days ago and did not have specific injuries to the R leg. Pt denies shortness of breath, fevers, or any other complaints. Allergies: Keflex, Penicillin. 65 y/o F pt w/ PMHx of DM and HTN presents to the ED for R lower leg pain today. Pt also notes swelling to her leg, associated mild chest pain, and shaking chills. Pt describes the pain as throbbing and severe. Pt states she fell 8 days ago and did not recall having specific injuries to the R leg. Pt denies shortness of breath, fevers, or any other complaints. Allergies: Keflex, Penicillin.

## 2017-03-14 NOTE — DISCHARGE NOTE ADULT - CARE PLAN
Principal Discharge DX:	Chest pain, atypical  Secondary Diagnosis:	Diabetic neuropathy Principal Discharge DX:	Chest pain, atypical  Goal:	resolution  Instructions for follow-up, activity and diet:	Monitored on telemetry for atypical chest pain, 2 set of cardiac enzyme negative.  EKG showed normal sinus rhythm , no significant ST or T wave changes. CT Angio Chest was done in ED, showed no central pulmonary embolism. Lobar, segmental, subsegmental pulmonary arteries cannot be evaluated due to incomplete opacification. 5 mm right lower lobe pulmonary nodule. Recommended follow up as out patient.    ECHO : normal LV function, mild pulmonary HTN. Evaluated by Cardiologist.    Continue medications as mentioned and follow up with PCP and Cardiologist as outpatient routinely.  Secondary Diagnosis:	Diabetic neuropathy  Goal:	prevent worsening  Instructions for follow-up, activity and diet:	Continue Gabapentin at increased dose. Keep blood sugars under control. Follow up with PCP routinely.  Secondary Diagnosis:	Diabetes mellitus  Goal:	maintain blood sugars within normal range.  Instructions for follow-up, activity and diet:	On variable insulin dosage at home. HbA1c 9.7. Endo Dr. Gann consulted. Recommended changes in insulin regimen. Discontinued Humulin 70/30 and added Humalog pre meals, continued Lantus. Monitored accu cheks and adjusted the insulin regimen. Take 28 units of Lantus at bedtime and 6 units Humalog pre meals. Monitor blood sugars daily at home, continue follow up with Endocrinologist as out patient and adjust regimen as needed. Eat diet low in complex carbohydrates. Weight control and exercise.  Secondary Diagnosis:	HTN (hypertension)  Goal:	maintain BP < 140/90 mm hg.  Instructions for follow-up, activity and diet:	Continue home medications and follow up with PCP and Cardiologist as outpatient. Monitor BP. Eat diet low in salt. Principal Discharge DX:	Chest pain, atypical  Goal:	resolution  Instructions for follow-up, activity and diet:	Monitored on telemetry for atypical chest pain, 2 set of cardiac enzyme negative.  EKG showed normal sinus rhythm , no significant ST or T wave changes. CT Angio Chest was done in ED, showed no central pulmonary embolism. Lobar, segmental, subsegmental pulmonary arteries cannot be evaluated due to incomplete opacification. 5 mm right lower lobe pulmonary nodule. Recommended follow up as out patient.    ECHO : normal LV function, mild pulmonary HTN. Evaluated by Cardiologist.    HbA1c of 9.7. Lipid panel was within normal limit.   Continue medications as mentioned and follow up with PCP and Cardiologist as outpatient routinely.  Secondary Diagnosis:	Diabetic neuropathy  Goal:	prevent worsening  Instructions for follow-up, activity and diet:	Continue Gabapentin at increased dose. Keep blood sugars under control. Follow up with PCP routinely.  Secondary Diagnosis:	Diabetes mellitus  Goal:	maintain blood sugars within normal range.  Instructions for follow-up, activity and diet:	On variable insulin dosage at home. HbA1c 9.7. Endo Dr. Gann consulted. Recommended changes in insulin regimen. Discontinued Humulin 70/30 and added Humalog pre meals, continued Lantus. Monitored accu cheks and adjusted the insulin regimen. Take 28 units of Lantus at bedtime and 6 units Humalog pre meals. Monitor blood sugars daily at home, continue follow up with Endocrinologist as out patient and adjust regimen as needed. Eat diet low in complex carbohydrates. Weight control and exercise.  Secondary Diagnosis:	HTN (hypertension)  Goal:	maintain BP < 140/90 mm hg.  Instructions for follow-up, activity and diet:	Continue home medications and follow up with PCP and Cardiologist as outpatient. Monitor BP. Eat diet low in salt.

## 2017-03-14 NOTE — DISCHARGE NOTE ADULT - HOSPITAL COURSE
66 years old female from home, PMH of DM, HTN presented to ED with right leg pain and chest pain. Patient admitted for further evaluation.     Monitored on telemetry for atypical chest pain, 2 set of cardiac enzyme negative.  EKG showed normal sinus rhythm , no significant ST or T wave changes   CTA was done in Ed, showed No central pulmonary embolism. Lobar, segmental, subsegmental pulmonary arteries cannot be evaluated due to incomplete opacification.  5 mm right lower lobe pulmonary nodule. Recommended follow up as out patient.  Continued with aspirin , statin , and beta blocker.    ECHO : normal LV function, mild pulmonary HTN.  Cardio Dr. Martinez. Patient refusing further cardiac intervention and also refusing    Leg pain: likely from diabetic neuropathy, doppler venous showed no DVT. Increased dose of Gabapentin.     HTN (hypertension): continued home HCTZ-irbesartan ( therapeutic interchange ) and Coreg. Blood pressure controlled.     DM (diabetes mellitus): Patient is on variable insulin dosage at home, takes Humulin 70/30  10 units in am as per son with Lantus 24 U at night. Continued  that regimen along with HSS   HbA1c 9.7. Endo Dr. Gann consulted. Recommended changes in insulin regimen. Discontinued Humulin 70/30 and added Humalog pre meals, continued Lantus. Monitored accu cheks and adjusted the insulin regimen. 66 years old female from home, PMH of DM, HTN presented to ED with right leg pain and chest pain. Patient admitted for further evaluation.     Monitored on telemetry for atypical chest pain, 2 set of cardiac enzyme negative.  EKG showed normal sinus rhythm , no significant ST or T wave changes   CTA was done in Ed, showed No central pulmonary embolism. Lobar, segmental, subsegmental pulmonary arteries cannot be evaluated due to incomplete opacification.  5 mm right lower lobe pulmonary nodule. Recommended follow up as out patient.  Continued with aspirin , statin , and beta blocker.    ECHO : normal LV function, mild pulmonary HTN.  Cardio Dr. Martinez. Patient refusing further cardiac intervention and also refusing    Leg pain: likely from diabetic neuropathy, doppler venous showed no DVT. Increased dose of Gabapentin.     HTN (hypertension): continued home HCTZ-irbesartan ( therapeutic interchange ) and Coreg. Blood pressure controlled.     DM (diabetes mellitus): Patient is on variable insulin dosage at home, takes Humulin 70/30  10 units in am as per son with Lantus 24 U at night. Continued  that regimen along with HSS   HbA1c 9.7. Endo Dr. Gann consulted. Recommended changes in insulin regimen. Discontinued Humulin 70/30 and added Humalog pre meals, continued Lantus. Monitored accu cheks and adjusted the insulin regimen.     Lipid panel was within normal limit, so statin not given upon discharge, as patient complains pf pain in extremities. Recommended to continue monitoring routine blood work with PCP as outpatient.     Patient seen and examined at bedside, all questions and concerns answered, medically stable to be discharged as per the attending. Patient being discharged home with home care.

## 2017-03-15 LAB
24R-OH-CALCIDIOL SERPL-MCNC: 40.8 NG/ML — SIGNIFICANT CHANGE UP (ref 30–100)
ANA PAT FLD IF-IMP: ABNORMAL
ANA TITR SER: ABNORMAL
FOLATE SERPL-MCNC: 14.5 NG/ML — SIGNIFICANT CHANGE UP (ref 4.8–24.2)
MAGNESIUM SERPL-MCNC: 2.3 MG/DL — SIGNIFICANT CHANGE UP (ref 1.8–2.4)
VIT B12 SERPL-MCNC: 559 PG/ML — SIGNIFICANT CHANGE UP (ref 243–894)

## 2017-03-15 RX ORDER — GABAPENTIN 400 MG/1
400 CAPSULE ORAL AT BEDTIME
Qty: 0 | Refills: 0 | Status: DISCONTINUED | OUTPATIENT
Start: 2017-03-15 | End: 2017-03-17

## 2017-03-15 RX ORDER — GABAPENTIN 400 MG/1
200 CAPSULE ORAL
Qty: 0 | Refills: 0 | Status: DISCONTINUED | OUTPATIENT
Start: 2017-03-15 | End: 2017-03-17

## 2017-03-15 RX ORDER — INSULIN LISPRO 100/ML
4 VIAL (ML) SUBCUTANEOUS
Qty: 0 | Refills: 0 | Status: DISCONTINUED | OUTPATIENT
Start: 2017-03-15 | End: 2017-03-17

## 2017-03-15 RX ADMIN — Medication 2: at 12:59

## 2017-03-15 RX ADMIN — ATORVASTATIN CALCIUM 20 MILLIGRAM(S): 80 TABLET, FILM COATED ORAL at 21:41

## 2017-03-15 RX ADMIN — Medication 4 UNIT(S): at 12:59

## 2017-03-15 RX ADMIN — Medication 10 UNIT(S): at 09:44

## 2017-03-15 RX ADMIN — Medication 2: at 17:40

## 2017-03-15 RX ADMIN — INSULIN GLARGINE 24 UNIT(S): 100 INJECTION, SOLUTION SUBCUTANEOUS at 21:42

## 2017-03-15 RX ADMIN — GABAPENTIN 400 MILLIGRAM(S): 400 CAPSULE ORAL at 21:41

## 2017-03-15 RX ADMIN — Medication 4 UNIT(S): at 17:40

## 2017-03-15 RX ADMIN — GABAPENTIN 200 MILLIGRAM(S): 400 CAPSULE ORAL at 17:41

## 2017-03-15 RX ADMIN — CARVEDILOL PHOSPHATE 12.5 MILLIGRAM(S): 80 CAPSULE, EXTENDED RELEASE ORAL at 08:46

## 2017-03-15 RX ADMIN — CARVEDILOL PHOSPHATE 12.5 MILLIGRAM(S): 80 CAPSULE, EXTENDED RELEASE ORAL at 17:41

## 2017-03-15 RX ADMIN — Medication 81 MILLIGRAM(S): at 13:00

## 2017-03-16 LAB — ERYTHROCYTE [SEDIMENTATION RATE] IN BLOOD: 38 MM/HR — HIGH (ref 0–20)

## 2017-03-16 RX ORDER — INSULIN GLARGINE 100 [IU]/ML
28 INJECTION, SOLUTION SUBCUTANEOUS AT BEDTIME
Qty: 0 | Refills: 0 | Status: DISCONTINUED | OUTPATIENT
Start: 2017-03-16 | End: 2017-03-17

## 2017-03-16 RX ORDER — GABAPENTIN 400 MG/1
1 CAPSULE ORAL
Qty: 0 | Refills: 0 | COMMUNITY

## 2017-03-16 RX ORDER — INSULIN LISPRO 100/ML
4 VIAL (ML) SUBCUTANEOUS
Qty: 0 | Refills: 0 | COMMUNITY
Start: 2017-03-16

## 2017-03-16 RX ORDER — INSULIN ASPART 100 [IU]/ML
10 INJECTION, SUSPENSION SUBCUTANEOUS
Qty: 0 | Refills: 0 | COMMUNITY

## 2017-03-16 RX ORDER — INSULIN LISPRO 100/ML
6 VIAL (ML) SUBCUTANEOUS
Qty: 0 | Refills: 0 | COMMUNITY
Start: 2017-03-16

## 2017-03-16 RX ADMIN — INSULIN GLARGINE 28 UNIT(S): 100 INJECTION, SOLUTION SUBCUTANEOUS at 21:56

## 2017-03-16 RX ADMIN — Medication 4 UNIT(S): at 17:00

## 2017-03-16 RX ADMIN — Medication 4 UNIT(S): at 12:08

## 2017-03-16 RX ADMIN — ATORVASTATIN CALCIUM 20 MILLIGRAM(S): 80 TABLET, FILM COATED ORAL at 21:56

## 2017-03-16 RX ADMIN — GABAPENTIN 400 MILLIGRAM(S): 400 CAPSULE ORAL at 21:56

## 2017-03-16 RX ADMIN — LOSARTAN POTASSIUM 100 MILLIGRAM(S): 100 TABLET, FILM COATED ORAL at 06:12

## 2017-03-16 RX ADMIN — CARVEDILOL PHOSPHATE 12.5 MILLIGRAM(S): 80 CAPSULE, EXTENDED RELEASE ORAL at 06:13

## 2017-03-16 RX ADMIN — Medication 4 UNIT(S): at 08:32

## 2017-03-16 RX ADMIN — Medication 81 MILLIGRAM(S): at 12:08

## 2017-03-16 RX ADMIN — GABAPENTIN 200 MILLIGRAM(S): 400 CAPSULE ORAL at 06:12

## 2017-03-16 RX ADMIN — Medication 2: at 16:59

## 2017-03-16 RX ADMIN — GABAPENTIN 200 MILLIGRAM(S): 400 CAPSULE ORAL at 12:08

## 2017-03-16 RX ADMIN — Medication 2: at 12:07

## 2017-03-16 RX ADMIN — Medication 2: at 08:31

## 2017-03-16 RX ADMIN — CARVEDILOL PHOSPHATE 12.5 MILLIGRAM(S): 80 CAPSULE, EXTENDED RELEASE ORAL at 18:29

## 2017-03-16 NOTE — DIETITIAN INITIAL EVALUATION ADULT. - NUTRITION INTERVENTION
Medical Food Supplements/Collaboration and Referral of Nutrition Care/Nutrition Education/Meals and Snack

## 2017-03-17 VITALS
DIASTOLIC BLOOD PRESSURE: 74 MMHG | HEART RATE: 67 BPM | SYSTOLIC BLOOD PRESSURE: 156 MMHG | RESPIRATION RATE: 18 BRPM | OXYGEN SATURATION: 99 % | TEMPERATURE: 98 F

## 2017-03-17 LAB
AUTO DIFF PNL BLD: ABNORMAL
C-ANCA SER-ACNC: NEGATIVE — SIGNIFICANT CHANGE UP
CCP IGG SERPL-ACNC: <8 UNITS — SIGNIFICANT CHANGE UP (ref 0–19)
CRP SERPL-MCNC: <0.2 MG/DL — SIGNIFICANT CHANGE UP (ref 0–0.4)
DSDNA AB SER-ACNC: <12 IU/ML — SIGNIFICANT CHANGE UP
P-ANCA SER-ACNC: NEGATIVE — SIGNIFICANT CHANGE UP
RF+CCP IGG SER-IMP: NEGATIVE — SIGNIFICANT CHANGE UP
RHEUMATOID FACT SERPL-ACNC: <7 IU/ML — SIGNIFICANT CHANGE UP (ref 0–13.9)

## 2017-03-17 RX ORDER — INSULIN DEGLUDEC 100 U/ML
24 INJECTION, SOLUTION SUBCUTANEOUS
Qty: 0 | Refills: 0 | COMMUNITY

## 2017-03-17 RX ORDER — INSULIN LISPRO 100/ML
6 VIAL (ML) SUBCUTANEOUS
Qty: 1 | Refills: 0 | OUTPATIENT
Start: 2017-03-17

## 2017-03-17 RX ORDER — NITROGLYCERIN 6.5 MG
1 CAPSULE, EXTENDED RELEASE ORAL
Qty: 0 | Refills: 0 | COMMUNITY

## 2017-03-17 RX ORDER — METFORMIN HYDROCHLORIDE 850 MG/1
1 TABLET ORAL
Qty: 0 | Refills: 0 | COMMUNITY

## 2017-03-17 RX ADMIN — Medication 4 UNIT(S): at 07:54

## 2017-03-17 RX ADMIN — Medication 4 UNIT(S): at 12:43

## 2017-03-17 RX ADMIN — GABAPENTIN 200 MILLIGRAM(S): 400 CAPSULE ORAL at 05:26

## 2017-03-17 RX ADMIN — Medication 0.4 MILLIGRAM(S): at 05:27

## 2017-03-17 RX ADMIN — CARVEDILOL PHOSPHATE 12.5 MILLIGRAM(S): 80 CAPSULE, EXTENDED RELEASE ORAL at 05:25

## 2017-03-17 RX ADMIN — ENOXAPARIN SODIUM 40 MILLIGRAM(S): 100 INJECTION SUBCUTANEOUS at 12:30

## 2017-03-17 RX ADMIN — Medication 1: at 12:30

## 2017-03-17 RX ADMIN — Medication 81 MILLIGRAM(S): at 12:30

## 2017-03-17 RX ADMIN — Medication 2: at 07:53

## 2017-03-17 RX ADMIN — GABAPENTIN 200 MILLIGRAM(S): 400 CAPSULE ORAL at 12:30

## 2017-03-17 RX ADMIN — LOSARTAN POTASSIUM 100 MILLIGRAM(S): 100 TABLET, FILM COATED ORAL at 05:26

## 2017-03-21 DIAGNOSIS — Z88.1 ALLERGY STATUS TO OTHER ANTIBIOTIC AGENTS STATUS: ICD-10-CM

## 2017-03-21 DIAGNOSIS — E66.9 OBESITY, UNSPECIFIED: ICD-10-CM

## 2017-03-21 DIAGNOSIS — Z91.14 PATIENT'S OTHER NONCOMPLIANCE WITH MEDICATION REGIMEN: ICD-10-CM

## 2017-03-21 DIAGNOSIS — Z88.0 ALLERGY STATUS TO PENICILLIN: ICD-10-CM

## 2017-03-21 DIAGNOSIS — G89.29 OTHER CHRONIC PAIN: ICD-10-CM

## 2017-03-21 DIAGNOSIS — E43 UNSPECIFIED SEVERE PROTEIN-CALORIE MALNUTRITION: ICD-10-CM

## 2017-03-21 DIAGNOSIS — R07.89 OTHER CHEST PAIN: ICD-10-CM

## 2017-03-21 DIAGNOSIS — I10 ESSENTIAL (PRIMARY) HYPERTENSION: ICD-10-CM

## 2017-03-21 DIAGNOSIS — E11.65 TYPE 2 DIABETES MELLITUS WITH HYPERGLYCEMIA: ICD-10-CM

## 2017-03-21 DIAGNOSIS — E11.40 TYPE 2 DIABETES MELLITUS WITH DIABETIC NEUROPATHY, UNSPECIFIED: ICD-10-CM

## 2017-04-11 ENCOUNTER — INPATIENT (INPATIENT)
Facility: HOSPITAL | Age: 67
LOS: 6 days | Discharge: ANOTHER HOSPITAL | End: 2017-04-18
Attending: PSYCHIATRY & NEUROLOGY | Admitting: PSYCHIATRY & NEUROLOGY
Payer: MEDICAID

## 2017-04-11 VITALS
OXYGEN SATURATION: 100 % | TEMPERATURE: 98 F | HEART RATE: 73 BPM | RESPIRATION RATE: 18 BRPM | DIASTOLIC BLOOD PRESSURE: 62 MMHG | SYSTOLIC BLOOD PRESSURE: 128 MMHG

## 2017-04-11 DIAGNOSIS — F31.10 BIPOLAR DISORDER, CURRENT EPISODE MANIC WITHOUT PSYCHOTIC FEATURES, UNSPECIFIED: ICD-10-CM

## 2017-04-11 DIAGNOSIS — F31.2 BIPOLAR DISORDER, CURRENT EPISODE MANIC SEVERE WITH PSYCHOTIC FEATURES: ICD-10-CM

## 2017-04-11 DIAGNOSIS — R69 ILLNESS, UNSPECIFIED: ICD-10-CM

## 2017-04-11 LAB
ALBUMIN SERPL ELPH-MCNC: 3.9 G/DL — SIGNIFICANT CHANGE UP (ref 3.3–5)
ALP SERPL-CCNC: 58 U/L — SIGNIFICANT CHANGE UP (ref 40–120)
ALT FLD-CCNC: 20 U/L — SIGNIFICANT CHANGE UP (ref 4–33)
AMPHET UR-MCNC: NEGATIVE — SIGNIFICANT CHANGE UP
APAP SERPL-MCNC: < 15 UG/ML — LOW (ref 15–25)
APPEARANCE UR: CLEAR — SIGNIFICANT CHANGE UP
AST SERPL-CCNC: 20 U/L — SIGNIFICANT CHANGE UP (ref 4–32)
B-OH-BUTYR SERPL-SCNC: 0.4 MMOL/L — SIGNIFICANT CHANGE UP (ref 0–0.4)
BARBITURATES MEASUREMENT: NEGATIVE — SIGNIFICANT CHANGE UP
BARBITURATES UR SCN-MCNC: NEGATIVE — SIGNIFICANT CHANGE UP
BASOPHILS # BLD AUTO: 0.03 K/UL — SIGNIFICANT CHANGE UP (ref 0–0.2)
BASOPHILS NFR BLD AUTO: 0.3 % — SIGNIFICANT CHANGE UP (ref 0–2)
BENZODIAZ SERPL-MCNC: NEGATIVE — SIGNIFICANT CHANGE UP
BENZODIAZ UR-MCNC: NEGATIVE — SIGNIFICANT CHANGE UP
BILIRUB SERPL-MCNC: 0.4 MG/DL — SIGNIFICANT CHANGE UP (ref 0.2–1.2)
BILIRUB UR-MCNC: NEGATIVE — SIGNIFICANT CHANGE UP
BLOOD UR QL VISUAL: NEGATIVE — SIGNIFICANT CHANGE UP
BUN SERPL-MCNC: 34 MG/DL — HIGH (ref 7–23)
CALCIUM SERPL-MCNC: 9.6 MG/DL — SIGNIFICANT CHANGE UP (ref 8.4–10.5)
CANNABINOIDS UR-MCNC: NEGATIVE — SIGNIFICANT CHANGE UP
CHLORIDE SERPL-SCNC: 100 MMOL/L — SIGNIFICANT CHANGE UP (ref 98–107)
CO2 SERPL-SCNC: 21 MMOL/L — LOW (ref 22–31)
COCAINE METAB.OTHER UR-MCNC: NEGATIVE — SIGNIFICANT CHANGE UP
COLOR SPEC: SIGNIFICANT CHANGE UP
CREAT SERPL-MCNC: 0.83 MG/DL — SIGNIFICANT CHANGE UP (ref 0.5–1.3)
EOSINOPHIL # BLD AUTO: 0.49 K/UL — SIGNIFICANT CHANGE UP (ref 0–0.5)
EOSINOPHIL NFR BLD AUTO: 5.6 % — SIGNIFICANT CHANGE UP (ref 0–6)
ETHANOL BLD-MCNC: < 10 MG/DL — SIGNIFICANT CHANGE UP
GLUCOSE SERPL-MCNC: 264 MG/DL — HIGH (ref 70–99)
GLUCOSE UR-MCNC: >1000 — SIGNIFICANT CHANGE UP
HCT VFR BLD CALC: 34.3 % — LOW (ref 34.5–45)
HGB BLD-MCNC: 11.4 G/DL — LOW (ref 11.5–15.5)
IMM GRANULOCYTES NFR BLD AUTO: 0.1 % — SIGNIFICANT CHANGE UP (ref 0–1.5)
KETONES UR-MCNC: NEGATIVE — SIGNIFICANT CHANGE UP
LEUKOCYTE ESTERASE UR-ACNC: SIGNIFICANT CHANGE UP
LYMPHOCYTES # BLD AUTO: 2.38 K/UL — SIGNIFICANT CHANGE UP (ref 1–3.3)
LYMPHOCYTES # BLD AUTO: 27 % — SIGNIFICANT CHANGE UP (ref 13–44)
MCHC RBC-ENTMCNC: 27.7 PG — SIGNIFICANT CHANGE UP (ref 27–34)
MCHC RBC-ENTMCNC: 33.2 % — SIGNIFICANT CHANGE UP (ref 32–36)
MCV RBC AUTO: 83.3 FL — SIGNIFICANT CHANGE UP (ref 80–100)
METHADONE UR-MCNC: NEGATIVE — SIGNIFICANT CHANGE UP
MONOCYTES # BLD AUTO: 0.56 K/UL — SIGNIFICANT CHANGE UP (ref 0–0.9)
MONOCYTES NFR BLD AUTO: 6.3 % — SIGNIFICANT CHANGE UP (ref 2–14)
MUCOUS THREADS # UR AUTO: SIGNIFICANT CHANGE UP
NEUTROPHILS # BLD AUTO: 5.35 K/UL — SIGNIFICANT CHANGE UP (ref 1.8–7.4)
NEUTROPHILS NFR BLD AUTO: 60.7 % — SIGNIFICANT CHANGE UP (ref 43–77)
NITRITE UR-MCNC: NEGATIVE — SIGNIFICANT CHANGE UP
OPIATES UR-MCNC: NEGATIVE — SIGNIFICANT CHANGE UP
OXYCODONE UR-MCNC: NEGATIVE — SIGNIFICANT CHANGE UP
PCP UR-MCNC: NEGATIVE — SIGNIFICANT CHANGE UP
PH UR: 5.5 — SIGNIFICANT CHANGE UP (ref 4.6–8)
PLATELET # BLD AUTO: 278 K/UL — SIGNIFICANT CHANGE UP (ref 150–400)
PMV BLD: 10.5 FL — SIGNIFICANT CHANGE UP (ref 7–13)
POTASSIUM SERPL-MCNC: 3.9 MMOL/L — SIGNIFICANT CHANGE UP (ref 3.5–5.3)
POTASSIUM SERPL-SCNC: 3.9 MMOL/L — SIGNIFICANT CHANGE UP (ref 3.5–5.3)
PROT SERPL-MCNC: 7.2 G/DL — SIGNIFICANT CHANGE UP (ref 6–8.3)
PROT UR-MCNC: NEGATIVE — SIGNIFICANT CHANGE UP
RBC # BLD: 4.12 M/UL — SIGNIFICANT CHANGE UP (ref 3.8–5.2)
RBC # FLD: 12.9 % — SIGNIFICANT CHANGE UP (ref 10.3–14.5)
RBC CASTS # UR COMP ASSIST: SIGNIFICANT CHANGE UP (ref 0–?)
SALICYLATES SERPL-MCNC: < 5 MG/DL — LOW (ref 15–30)
SODIUM SERPL-SCNC: 139 MMOL/L — SIGNIFICANT CHANGE UP (ref 135–145)
SP GR SPEC: 1.03 — SIGNIFICANT CHANGE UP (ref 1–1.03)
SQUAMOUS # UR AUTO: SIGNIFICANT CHANGE UP
TSH SERPL-MCNC: 0.81 UIU/ML — SIGNIFICANT CHANGE UP (ref 0.27–4.2)
UROBILINOGEN FLD QL: NORMAL E.U. — SIGNIFICANT CHANGE UP (ref 0.1–0.2)
WBC # BLD: 8.82 K/UL — SIGNIFICANT CHANGE UP (ref 3.8–10.5)
WBC # FLD AUTO: 8.82 K/UL — SIGNIFICANT CHANGE UP (ref 3.8–10.5)
WBC UR QL: SIGNIFICANT CHANGE UP (ref 0–?)

## 2017-04-11 PROCEDURE — 99285 EMERGENCY DEPT VISIT HI MDM: CPT

## 2017-04-11 RX ORDER — ASPIRIN/CALCIUM CARB/MAGNESIUM 324 MG
81 TABLET ORAL DAILY
Qty: 0 | Refills: 0 | Status: DISCONTINUED | OUTPATIENT
Start: 2017-04-11 | End: 2017-04-12

## 2017-04-11 RX ORDER — INSULIN LISPRO 100/ML
4 VIAL (ML) SUBCUTANEOUS ONCE
Qty: 0 | Refills: 0 | Status: COMPLETED | OUTPATIENT
Start: 2017-04-11 | End: 2017-04-11

## 2017-04-11 RX ORDER — GABAPENTIN 400 MG/1
100 CAPSULE ORAL
Qty: 0 | Refills: 0 | Status: DISCONTINUED | OUTPATIENT
Start: 2017-04-11 | End: 2017-04-12

## 2017-04-11 RX ORDER — LOSARTAN POTASSIUM 100 MG/1
100 TABLET, FILM COATED ORAL DAILY
Qty: 0 | Refills: 0 | Status: DISCONTINUED | OUTPATIENT
Start: 2017-04-11 | End: 2017-04-18

## 2017-04-11 RX ORDER — DIVALPROEX SODIUM 500 MG/1
500 TABLET, DELAYED RELEASE ORAL
Qty: 0 | Refills: 0 | Status: DISCONTINUED | OUTPATIENT
Start: 2017-04-11 | End: 2017-04-12

## 2017-04-11 RX ORDER — INSULIN DEGLUDEC 100 U/ML
28 INJECTION, SOLUTION SUBCUTANEOUS
Qty: 0 | Refills: 0 | COMMUNITY

## 2017-04-11 RX ORDER — SODIUM CHLORIDE 9 MG/ML
1000 INJECTION, SOLUTION INTRAVENOUS
Qty: 0 | Refills: 0 | Status: DISCONTINUED | OUTPATIENT
Start: 2017-04-11 | End: 2017-04-12

## 2017-04-11 RX ORDER — INSULIN LISPRO 100/ML
VIAL (ML) SUBCUTANEOUS
Qty: 0 | Refills: 0 | Status: DISCONTINUED | OUTPATIENT
Start: 2017-04-11 | End: 2017-04-18

## 2017-04-11 RX ORDER — DEXTROSE 50 % IN WATER 50 %
1 SYRINGE (ML) INTRAVENOUS ONCE
Qty: 0 | Refills: 0 | Status: DISCONTINUED | OUTPATIENT
Start: 2017-04-11 | End: 2017-04-18

## 2017-04-11 RX ORDER — CARVEDILOL PHOSPHATE 80 MG/1
12.5 CAPSULE, EXTENDED RELEASE ORAL DAILY
Qty: 0 | Refills: 0 | Status: DISCONTINUED | OUTPATIENT
Start: 2017-04-11 | End: 2017-04-11

## 2017-04-11 RX ORDER — GLUCAGON INJECTION, SOLUTION 0.5 MG/.1ML
1 INJECTION, SOLUTION SUBCUTANEOUS ONCE
Qty: 0 | Refills: 0 | Status: DISCONTINUED | OUTPATIENT
Start: 2017-04-11 | End: 2017-04-18

## 2017-04-11 RX ORDER — HALOPERIDOL DECANOATE 100 MG/ML
5 INJECTION INTRAMUSCULAR ONCE
Qty: 0 | Refills: 0 | Status: COMPLETED | OUTPATIENT
Start: 2017-04-11 | End: 2017-04-11

## 2017-04-11 RX ADMIN — Medication 2 MILLIGRAM(S): at 14:53

## 2017-04-11 RX ADMIN — Medication 4 UNIT(S): at 16:53

## 2017-04-11 RX ADMIN — HALOPERIDOL DECANOATE 5 MILLIGRAM(S): 100 INJECTION INTRAMUSCULAR at 14:53
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
## 2017-04-11 NOTE — ED PROVIDER NOTE - CHPI ED SYMPTOMS NEG
no weight loss/no confusion/no paranoia/no homicidal/no change in level of consciousness/no disorientation/no weakness/no hallucinations/no suicidal

## 2017-04-11 NOTE — ED ADULT TRIAGE NOTE - CHIEF COMPLAINT QUOTE
arrives from home with daughters c/o increased aggression, delusional, making up stories, hearing voices , she hasn't been sleeping for 6 months, non complaint with all prescribed medication . pt became violent in triage, screaming and yelling pmh- diabetic, htn, neuropathy
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
## 2017-04-11 NOTE — ED ADULT NURSE REASSESSMENT NOTE - NS ED NURSE REASSESS COMMENT FT1
14:50-Patient taken out of 4 point leather restraints at 14:15 once noted to be calm, blood work done. needs met, pt currently in BH room 6, awaiting further MD evaluation
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
## 2017-04-11 NOTE — ED PROVIDER NOTE - OBJECTIVE STATEMENT
The patient is a 66y Female followed by internist Dr. Mandy Johnson at  and Dr. Nair  at  endocrinologist w/ hx of HTN, DM, neuropathy brought to ed by her daughter Charlee for psych eval in setting of aggression and noncompliant w/ meds.  Pt denies recent illnesses, no cp or sob, no fever or chills, no abd pain, back or flank pain, no UTI symptoms.  Denies recent injuries or trauma, s/p fall back in December of 2016 and was evaluated at OSH and no reported injuries.  Denies etoh or illicit drugs, no HI/SI or AVH.  Of note, spoke w/ Kayden from [facility] at  who informed me that pt has multiple prescribers managing her BP and DM. Medication reviewed w/ pt and reported that she does not take insulin at night.

## 2017-04-11 NOTE — ED PROVIDER NOTE - MEDICAL DECISION MAKING DETAILS
67y/o Female followed by internist Dr. Mandy Johnson at  and Dr. Nair  at  endocrinologist w/ hx of HTN, DM, neuropathy brought to ed by her daughter Charlee for psych eval in setting of aggression and noncompliant w/ meds.  Pt initially agitated but calm and cooperative at time of my eval.  Ambulating w/ steady gait, CN II- XII intact, rt lower shin w/ 2x2 cm skin tear- bacitracin to area and covered w/ gauze.  B/L lower extremities w/ trace edema and Neg US from other visits last month- clear lungs on exam-      Will check CBC w/diff to eval for anemia, leukocytosis  CMP-eval for electrolyte abnormality/renal function/ liver function, TSH, Tox, UA, drug levels and ECG-   Blood glucose 264mg/dl, betahydroxy wnl- reassuring- will treat hyperglycemia w/ 4 unit Humalog  Will clear pt medically for psych admission- recs Q 6 hour FS and metformin 1000mg BID w/ insulin sliding scale coverage overnight, and endocrine f/u in AM. 67y/o Female followed by internist Dr. Mandy Johnson at  and Dr. Nair  at  endocrinologist w/ hx of HTN, DM, neuropathy brought to ed by her daughter Charlee for psych eval in setting of aggression and noncompliant w/ meds.  Pt initially agitated but calm and cooperative at time of my eval.  Ambulating w/ steady gait, CN II- XII intact, rt lower shin w/ 2x2 cm skin tear- bacitracin to area and covered w/ gauze.  B/L lower extremities w/ trace edema and Neg US from other visits last month- clear lungs on exam-      Will check CBC w/diff to eval for anemia, leukocytosis  CMP-eval for electrolyte abnormality/renal function/ liver function, TSH, Tox, UA, drug levels and ECG-   Blood glucose 264mg/dl, betahydroxy wnl- reassuring- will treat hyperglycemia w/ 4 unit Humalog.   UA contaminated w/o UTI symptoms or CVAT on exam- Will clear pt medically for psych admission- recs Q 6 hour FS and metformin 1000mg BID w/ insulin sliding scale coverage overnight, and endocrine f/u in AM.
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
## 2017-04-11 NOTE — ED BEHAVIORAL HEALTH ASSESSMENT NOTE - HPI (INCLUDE ILLNESS QUALITY, SEVERITY, DURATION, TIMING, CONTEXT, MODIFYING FACTORS, ASSOCIATED SIGNS AND SYMPTOMS)
Ms. Douglass is a 66 year old Chadian-American F with a history of depression and (according to daughters) histrionic personality traits.  She lives alone and recently  from .  She has a medical history significant for HTN, HLD, DMII with peripheral neuropathy.  She presented to the ED brought in by her daughters voluntarily but almost immediately tried to elope from the ED.  She was labile and agitated, yelling in mixed Malaysian and English, refusing  and required IM meds, after which she was sleeping.      Most of the collateral was obtained from the patient's daughters.  AccoPatient resides alone since her  left her 2 months ago. She has never had inpatient care. She is in OP treatment with Dr. Emigdio Walter (google: [phone]. The patient has been decompensating since December, with recent worsening. The doctor has been on vacation, so the daughters used a ruse to get the patient to come today, telling her Millicent’s injured hand required treatment. The patient had been attempting to hit a lamp she suspected contained a camera, and it fell on Millicent’s hand, injuring her. When the patient realized she was the identified patient at the [facility]’s desk, she attempted to leave.     A month ago the patient kept complaining of physical symptoms, and accusing family members of trying to harm her, so Millicent took her to her internist, Dr. Ileana Rubinstein (Google: not found), who advised her to see her psychiatrist. The daughters have been unable to reach Dr. Walter, and have been attempting to convince the patient to come to the ED since.     The patient has always been manipulative and attention-seeking. She often calls ambulances complaining of aches and pains, and then refuses transport when the ambulance arrives. She has a history of treatment for depression, with which she has largely been noncompliant. She has always talked to herself, talking about all the negative things that happen to her. Her daughters do not think she is responding to internal stimuli when doing this. Beginning last , she started incessantly accusing her  of infidelity, which was untrue, so he left. She began complaining that strangers were watching her. She began having difficulty sleeping in November, sleeping consistently 2-3 hours nightly since, then arising to engage in excessive goal-directed activity. However, she has been distractible, so she is unsuccessful with tasks she is attempting to accomplish. She has been unable to cook a meal, which she was previously adept at. She will look for papers, and moves items all over the house looking for them, resulting in disarray. She tends to her hygiene, but takes excessive time to do so. Just lately, she has begun constantly cleaning excessively, both at her home and her daughter’s apartment. Weeks ago, she started complaining of “things crawling all over her, and worms crawling inside of her.” She has been saying others are watching her, and her daughters are trying to poison her. She thinks her  sneaked into her apartment and poisoned all her food, so she discarded all of it. She put all items she believes are poisoned into one room, including her houseplants and bed, and bought a new bed, only to then state the new bed is poisoned also. Six weeks ago, she emptied her home, throwing out her belongings, including clothing, thinking her ex- had poisoned them all. She will not talk to her son, believing he is in contact with her ex-. She changed her locks, and now stated she intends to change them again. She will not accept water her daughters offer her, thinking they are trying to poison her. She had never been a Episcopalian person until recently, and now is hyperreligious. She has racing thoughts, with incessant, pressured speech, flight of ideas, and at times, disorganized speech. She has never had such an episode before. A month ago she asked her internist for a pill she could take to die. Two weeks ago, she had an altercation with her son, and said she was going to throw herself under a car. She then called 911, and accused her son of assault, but the police did not believe her, since she had no bruises, and left. Lately, she has been stating she believes she is going to die. She stated others are dressing in black, which has a negative significance for her, and that when she injured her leg, she saw both her  parents, and they were trying to get her to go with them. Ms. Douglass is a 66 year old Northern Irish-American F with a history of depression and (according to daughters) histrionic personality traits.  She lives alone and recently  from .  She has a medical history significant for HTN, HLD, DMII with peripheral neuropathy.  She presented to the ED brought in by her daughters voluntarily but almost immediately tried to elope from the ED.  She was labile and agitated, yelling in mixed Anguillan and English, refusing  and required IM meds, after which she was sleeping.      Most of the collateral was obtained from the patient's daughters.  According to daughters, patient resides alone since her  left her 2 months ago. She has never had inpatient care. She is in OP treatment with Dr. Emigdio Walter (google: [phone]. The patient has been decompensating since December, with recent worsening. The doctor has been on vacation, so the daughters used a ruse to get the patient to come today, telling her Millicent’s injured hand required treatment. The patient had been attempting to hit a lamp she suspected contained a camera in her home, and it fell on Millicent’s hand, injuring her.     A month ago the patient kept complaining of physical symptoms, and accusing family members of trying to harm her, so daughter took her to her internist, Dr. Ileana Rubinstein (Google: not found), who advised her to see her psychiatrist. The daughters have been unable to reach Dr. Walter, and have been attempting to convince the patient to come to the ED since. The patient has always been manipulative and attention-seeking. She often calls ambulances complaining of aches and pains, and then refuses transport when the ambulance arrives. She has a history of treatment for depression, with which she has largely been noncompliant. She has always talked to herself, talking about all the negative things that happen to her. Her daughters do not think she is responding to internal stimuli when doing this. Beginning last , she started incessantly accusing her  of infidelity, which was untrue, so he left. She began complaining that strangers were watching her. She began having difficulty sleeping in November, sleeping consistently 2-3 hours nightly since, then arising to engage in excessive goal-directed activity. However, she has been distractible, so she is unsuccessful with tasks she is attempting to accomplish. She has been unable to cook a meal, which she was previously adept at. She will look for papers, and moves items all over the house looking for them, resulting in disarray. She tends to her hygiene, but takes excessive time to do so. Just lately, she has begun constantly cleaning excessively, both at her home and her daughter’s apartment. Weeks ago, she started complaining of “things crawling all over her, and worms crawling inside of her.” She has been saying others are watching her, and her daughters are trying to poison her. She thinks her  sneaked into her apartment and poisoned all her food, so she discarded all of it. She put all items she believes are poisoned into one room, including her houseplants and bed, and bought a new bed, only to then state the new bed is poisoned also. Six weeks ago, she emptied her home, throwing out her belongings, including clothing, thinking her ex- had poisoned them all. She will not talk to her son, believing he is in contact with her ex-. She changed her locks, and now stated she intends to change them again. She will not accept water her daughters offer her, thinking they are trying to poison her. She had never been a Episcopalian person until recently, and now is hyperreligious. She has racing thoughts, with incessant, pressured speech, flight of ideas, and at times, disorganized speech. She has never had such an episode before. A month ago she asked her internist for a pill she could take to die. Two weeks ago, she had an altercation with her son, and said she was going to throw herself under a car. She then called 911, and accused her son of assault, but the police did not believe her, since she had no bruises, and left. Lately, she has been stating she believes she is going to die. She stated others are dressing in black, which has a negative significance for her, and that when she injured her leg, she saw both her  parents, and they were trying to get her to go with them.

## 2017-04-11 NOTE — ED BEHAVIORAL HEALTH ASSESSMENT NOTE - CURRENT MEDICATION
Metformin 1000mg BID, Carvedilol 12.5mg BID, Irbestatin-HCTZ 300/12.5, ASA 81mg, gabapentin 100mg BID, Home insulin(unknown doses)

## 2017-04-11 NOTE — ED BEHAVIORAL HEALTH ASSESSMENT NOTE - MEDICAL ISSUES AND PLAN (INCLUDE STANDING AND PRN MEDICATION)
1. SSI for insulin per EDMD, endocrine consult in AM  2. Repeat EKG in 24 hours (QTc 475 in ED)  3. Continue other home meds

## 2017-04-11 NOTE — ED BEHAVIORAL HEALTH ASSESSMENT NOTE - OTHER PAST PSYCHIATRIC HISTORY (INCLUDE DETAILS REGARDING ONSET, COURSE OF ILLNESS, INPATIENT/OUTPATIENT TREATMENT)
Patient was placed on Lexapro in 2012, but refused to take it. She was hospitalized on a medical unit at [facility], after falling and suffering leg pain. She was prescribed Gabapentin there, but refused to take it. She was on insulin for diabetes, but was switched to Metformin after refusing to take the insulin. She suffers neuropathy and twitches. Patient was placed on Lexapro in 2012, but refused to take it.

## 2017-04-11 NOTE — ED BEHAVIORAL HEALTH ASSESSMENT NOTE - SUMMARY
Ms. Douglass is a 66 year old [place]-American F with a history of depression and (according to daughters) histrionic personality traits.  She lives alone and recently  from .  She has a medical history significant for HTN, HLD, DMII with peripheral neuropathy.  She presented to the ED brought in by her daughters voluntarily but almost immediately tried to elope from the ED.  She was labile and agitated, yelling in mixed Bahamian and English, refusing  and required IM meds, after which she was sleeping.  Family reports that she has been increasingly manic of late with poor sleep, increase goal directed activity, aggression with family.  She was aggressive in the ED requiring restraints and PRN meds

## 2017-04-11 NOTE — ED BEHAVIORAL HEALTH ASSESSMENT NOTE - PRIMARY DX
Bipolar I disorder, current or most recent episode manic, with psychotic features Deferred condition on axis II

## 2017-04-11 NOTE — ED BEHAVIORAL HEALTH ASSESSMENT NOTE - AXIS IV
Problem related to social environment/Problems with primary support/Problems with access to healthcare services

## 2017-04-11 NOTE — ED BEHAVIORAL HEALTH ASSESSMENT NOTE - INTERPRETER INFO / ID #
Croatian  refused by patient; used her daughter for some translation because it was all patient would allow

## 2017-04-12 PROCEDURE — 99221 1ST HOSP IP/OBS SF/LOW 40: CPT

## 2017-04-12 PROCEDURE — 99223 1ST HOSP IP/OBS HIGH 75: CPT

## 2017-04-12 RX ORDER — PIOGLITAZONE HYDROCHLORIDE 15 MG/1
15 TABLET ORAL DAILY
Qty: 0 | Refills: 0 | Status: DISCONTINUED | OUTPATIENT
Start: 2017-04-12 | End: 2017-04-18

## 2017-04-12 RX ORDER — ASPIRIN/CALCIUM CARB/MAGNESIUM 324 MG
81 TABLET ORAL AT BEDTIME
Qty: 0 | Refills: 0 | Status: DISCONTINUED | OUTPATIENT
Start: 2017-04-12 | End: 2017-04-18

## 2017-04-12 RX ORDER — ARIPIPRAZOLE 15 MG/1
2 TABLET ORAL AT BEDTIME
Qty: 0 | Refills: 0 | Status: DISCONTINUED | OUTPATIENT
Start: 2017-04-12 | End: 2017-04-13

## 2017-04-12 RX ORDER — METFORMIN HYDROCHLORIDE 850 MG/1
1000 TABLET ORAL
Qty: 0 | Refills: 0 | Status: DISCONTINUED | OUTPATIENT
Start: 2017-04-12 | End: 2017-04-18

## 2017-04-12 RX ADMIN — Medication 81 MILLIGRAM(S): at 21:07

## 2017-04-12 RX ADMIN — METFORMIN HYDROCHLORIDE 1000 MILLIGRAM(S): 850 TABLET ORAL at 17:12

## 2017-04-12 RX ADMIN — Medication: at 09:34

## 2017-04-12 RX ADMIN — PIOGLITAZONE HYDROCHLORIDE 15 MILLIGRAM(S): 15 TABLET ORAL at 17:12

## 2017-04-12 RX ADMIN — ARIPIPRAZOLE 2 MILLIGRAM(S): 15 TABLET ORAL at 22:42

## 2017-04-12 RX ADMIN — Medication 0.5 MILLIGRAM(S): at 22:42

## 2017-04-12 RX ADMIN — Medication 10 MILLIGRAM(S): at 17:11

## 2017-04-13 PROCEDURE — 93010 ELECTROCARDIOGRAM REPORT: CPT

## 2017-04-13 PROCEDURE — 99232 SBSQ HOSP IP/OBS MODERATE 35: CPT | Mod: 25

## 2017-04-13 PROCEDURE — 90853 GROUP PSYCHOTHERAPY: CPT

## 2017-04-13 RX ORDER — DIVALPROEX SODIUM 500 MG/1
500 TABLET, DELAYED RELEASE ORAL AT BEDTIME
Qty: 0 | Refills: 0 | Status: DISCONTINUED | OUTPATIENT
Start: 2017-04-13 | End: 2017-04-14

## 2017-04-13 RX ORDER — LOPERAMIDE HCL 2 MG
2 TABLET ORAL
Qty: 0 | Refills: 0 | Status: DISCONTINUED | OUTPATIENT
Start: 2017-04-13 | End: 2017-04-18

## 2017-04-13 RX ORDER — QUETIAPINE FUMARATE 200 MG/1
50 TABLET, FILM COATED ORAL AT BEDTIME
Qty: 0 | Refills: 0 | Status: DISCONTINUED | OUTPATIENT
Start: 2017-04-13 | End: 2017-04-17

## 2017-04-13 RX ORDER — LOPERAMIDE HCL 2 MG
2 TABLET ORAL ONCE
Qty: 0 | Refills: 0 | Status: COMPLETED | OUTPATIENT
Start: 2017-04-13 | End: 2017-04-13

## 2017-04-13 RX ADMIN — Medication 0.5 MILLIGRAM(S): at 22:00

## 2017-04-13 RX ADMIN — METFORMIN HYDROCHLORIDE 1000 MILLIGRAM(S): 850 TABLET ORAL at 12:34

## 2017-04-13 RX ADMIN — DIVALPROEX SODIUM 500 MILLIGRAM(S): 500 TABLET, DELAYED RELEASE ORAL at 22:00

## 2017-04-13 RX ADMIN — PIOGLITAZONE HYDROCHLORIDE 15 MILLIGRAM(S): 15 TABLET ORAL at 08:36

## 2017-04-13 RX ADMIN — METFORMIN HYDROCHLORIDE 1000 MILLIGRAM(S): 850 TABLET ORAL at 16:46

## 2017-04-13 RX ADMIN — Medication 10 MILLIGRAM(S): at 08:33

## 2017-04-13 RX ADMIN — Medication 10 MILLIGRAM(S): at 08:28

## 2017-04-13 RX ADMIN — Medication 81 MILLIGRAM(S): at 22:00

## 2017-04-13 RX ADMIN — Medication 10 MILLIGRAM(S): at 16:46

## 2017-04-13 RX ADMIN — Medication 2 MILLIGRAM(S): at 00:25

## 2017-04-13 RX ADMIN — LOSARTAN POTASSIUM 100 MILLIGRAM(S): 100 TABLET, FILM COATED ORAL at 09:09

## 2017-04-13 RX ADMIN — QUETIAPINE FUMARATE 50 MILLIGRAM(S): 200 TABLET, FILM COATED ORAL at 22:00

## 2017-04-14 PROCEDURE — 99232 SBSQ HOSP IP/OBS MODERATE 35: CPT | Mod: 25

## 2017-04-14 PROCEDURE — 90853 GROUP PSYCHOTHERAPY: CPT

## 2017-04-14 RX ORDER — DIVALPROEX SODIUM 500 MG/1
750 TABLET, DELAYED RELEASE ORAL AT BEDTIME
Qty: 0 | Refills: 0 | Status: DISCONTINUED | OUTPATIENT
Start: 2017-04-14 | End: 2017-04-18

## 2017-04-14 RX ADMIN — Medication 0.5 MILLIGRAM(S): at 20:35

## 2017-04-14 RX ADMIN — METFORMIN HYDROCHLORIDE 1000 MILLIGRAM(S): 850 TABLET ORAL at 08:27

## 2017-04-14 RX ADMIN — QUETIAPINE FUMARATE 50 MILLIGRAM(S): 200 TABLET, FILM COATED ORAL at 20:35

## 2017-04-14 RX ADMIN — Medication 20 MILLIGRAM(S): at 17:05

## 2017-04-14 RX ADMIN — LOSARTAN POTASSIUM 100 MILLIGRAM(S): 100 TABLET, FILM COATED ORAL at 08:27

## 2017-04-14 RX ADMIN — Medication 81 MILLIGRAM(S): at 20:34

## 2017-04-14 RX ADMIN — METFORMIN HYDROCHLORIDE 1000 MILLIGRAM(S): 850 TABLET ORAL at 17:07

## 2017-04-15 PROCEDURE — 99232 SBSQ HOSP IP/OBS MODERATE 35: CPT

## 2017-04-15 RX ADMIN — METFORMIN HYDROCHLORIDE 1000 MILLIGRAM(S): 850 TABLET ORAL at 17:08

## 2017-04-15 RX ADMIN — QUETIAPINE FUMARATE 50 MILLIGRAM(S): 200 TABLET, FILM COATED ORAL at 22:11

## 2017-04-15 RX ADMIN — Medication 0.5 MILLIGRAM(S): at 22:11

## 2017-04-15 RX ADMIN — Medication 20 MILLIGRAM(S): at 12:25

## 2017-04-15 RX ADMIN — Medication 20 MILLIGRAM(S): at 17:08

## 2017-04-15 RX ADMIN — METFORMIN HYDROCHLORIDE 1000 MILLIGRAM(S): 850 TABLET ORAL at 08:58

## 2017-04-15 RX ADMIN — Medication 81 MILLIGRAM(S): at 22:11

## 2017-04-15 RX ADMIN — LOSARTAN POTASSIUM 100 MILLIGRAM(S): 100 TABLET, FILM COATED ORAL at 12:26

## 2017-04-16 PROCEDURE — 99231 SBSQ HOSP IP/OBS SF/LOW 25: CPT

## 2017-04-16 PROCEDURE — 99233 SBSQ HOSP IP/OBS HIGH 50: CPT | Mod: GC

## 2017-04-16 RX ORDER — LACTOBACILLUS ACIDOPHILUS 100MM CELL
1 CAPSULE ORAL DAILY
Qty: 0 | Refills: 0 | Status: DISCONTINUED | OUTPATIENT
Start: 2017-04-16 | End: 2017-04-18

## 2017-04-16 RX ORDER — ACETAMINOPHEN 500 MG
650 TABLET ORAL EVERY 6 HOURS
Qty: 0 | Refills: 0 | Status: DISCONTINUED | OUTPATIENT
Start: 2017-04-16 | End: 2017-04-18

## 2017-04-16 RX ADMIN — QUETIAPINE FUMARATE 50 MILLIGRAM(S): 200 TABLET, FILM COATED ORAL at 21:04

## 2017-04-16 RX ADMIN — Medication 600 MILLIGRAM(S): at 21:03

## 2017-04-16 RX ADMIN — DIVALPROEX SODIUM 750 MILLIGRAM(S): 500 TABLET, DELAYED RELEASE ORAL at 21:03

## 2017-04-16 RX ADMIN — Medication 600 MILLIGRAM(S): at 11:08

## 2017-04-16 RX ADMIN — Medication 81 MILLIGRAM(S): at 21:03

## 2017-04-16 RX ADMIN — METFORMIN HYDROCHLORIDE 1000 MILLIGRAM(S): 850 TABLET ORAL at 11:09

## 2017-04-16 RX ADMIN — METFORMIN HYDROCHLORIDE 1000 MILLIGRAM(S): 850 TABLET ORAL at 18:49

## 2017-04-16 RX ADMIN — Medication 1 TABLET(S): at 18:49

## 2017-04-16 RX ADMIN — Medication 650 MILLIGRAM(S): at 13:24

## 2017-04-16 RX ADMIN — Medication 650 MILLIGRAM(S): at 15:30

## 2017-04-16 RX ADMIN — Medication 0.5 MILLIGRAM(S): at 21:04

## 2017-04-16 RX ADMIN — Medication 20 MILLIGRAM(S): at 18:49

## 2017-04-17 PROCEDURE — 70551 MRI BRAIN STEM W/O DYE: CPT | Mod: 26

## 2017-04-17 PROCEDURE — 99233 SBSQ HOSP IP/OBS HIGH 50: CPT

## 2017-04-17 RX ORDER — KETOCONAZOLE 20 MG/G
1 AEROSOL, FOAM TOPICAL DAILY
Qty: 0 | Refills: 0 | Status: DISCONTINUED | OUTPATIENT
Start: 2017-04-17 | End: 2017-04-18

## 2017-04-17 RX ORDER — CARVEDILOL PHOSPHATE 80 MG/1
12.5 CAPSULE, EXTENDED RELEASE ORAL EVERY 12 HOURS
Qty: 0 | Refills: 0 | Status: DISCONTINUED | OUTPATIENT
Start: 2017-04-17 | End: 2017-04-18

## 2017-04-17 RX ORDER — QUETIAPINE FUMARATE 200 MG/1
100 TABLET, FILM COATED ORAL AT BEDTIME
Qty: 0 | Refills: 0 | Status: DISCONTINUED | OUTPATIENT
Start: 2017-04-17 | End: 2017-04-18

## 2017-04-17 RX ADMIN — Medication 650 MILLIGRAM(S): at 13:20

## 2017-04-17 RX ADMIN — METFORMIN HYDROCHLORIDE 1000 MILLIGRAM(S): 850 TABLET ORAL at 16:20

## 2017-04-17 RX ADMIN — Medication 20 MILLIGRAM(S): at 16:19

## 2017-04-17 RX ADMIN — Medication 650 MILLIGRAM(S): at 06:05

## 2017-04-17 RX ADMIN — Medication 81 MILLIGRAM(S): at 21:37

## 2017-04-17 RX ADMIN — Medication 1 APPLICATION(S): at 23:26

## 2017-04-17 RX ADMIN — Medication 650 MILLIGRAM(S): at 07:00

## 2017-04-17 RX ADMIN — Medication 600 MILLIGRAM(S): at 13:20

## 2017-04-17 RX ADMIN — KETOCONAZOLE 1 APPLICATION(S): 20 AEROSOL, FOAM TOPICAL at 16:11

## 2017-04-17 RX ADMIN — Medication 600 MILLIGRAM(S): at 21:38

## 2017-04-17 RX ADMIN — Medication 600 MILLIGRAM(S): at 08:31

## 2017-04-17 RX ADMIN — METFORMIN HYDROCHLORIDE 1000 MILLIGRAM(S): 850 TABLET ORAL at 08:39

## 2017-04-17 RX ADMIN — Medication 650 MILLIGRAM(S): at 23:38

## 2017-04-17 RX ADMIN — Medication 20 MILLIGRAM(S): at 08:39

## 2017-04-17 RX ADMIN — Medication 650 MILLIGRAM(S): at 14:19

## 2017-04-17 RX ADMIN — Medication 650 MILLIGRAM(S): at 22:00

## 2017-04-17 RX ADMIN — CARVEDILOL PHOSPHATE 12.5 MILLIGRAM(S): 80 CAPSULE, EXTENDED RELEASE ORAL at 21:38

## 2017-04-18 ENCOUNTER — INPATIENT (INPATIENT)
Facility: HOSPITAL | Age: 67
LOS: 5 days | Discharge: HOME | End: 2017-04-24
Attending: HOSPITALIST | Admitting: HOSPITALIST
Payer: MEDICAID

## 2017-04-18 VITALS
TEMPERATURE: 98 F | RESPIRATION RATE: 18 BRPM | HEART RATE: 77 BPM | DIASTOLIC BLOOD PRESSURE: 75 MMHG | SYSTOLIC BLOOD PRESSURE: 150 MMHG | OXYGEN SATURATION: 98 %

## 2017-04-18 VITALS — TEMPERATURE: 98 F

## 2017-04-18 DIAGNOSIS — L03.115 CELLULITIS OF RIGHT LOWER LIMB: ICD-10-CM

## 2017-04-18 DIAGNOSIS — I10 ESSENTIAL (PRIMARY) HYPERTENSION: ICD-10-CM

## 2017-04-18 DIAGNOSIS — G62.9 POLYNEUROPATHY, UNSPECIFIED: ICD-10-CM

## 2017-04-18 DIAGNOSIS — Z79.899 OTHER LONG TERM (CURRENT) DRUG THERAPY: ICD-10-CM

## 2017-04-18 DIAGNOSIS — R45.1 RESTLESSNESS AND AGITATION: ICD-10-CM

## 2017-04-18 DIAGNOSIS — E11.9 TYPE 2 DIABETES MELLITUS WITHOUT COMPLICATIONS: ICD-10-CM

## 2017-04-18 LAB
ALBUMIN SERPL ELPH-MCNC: 4 G/DL — SIGNIFICANT CHANGE UP (ref 3.3–5)
ALP SERPL-CCNC: 60 U/L — SIGNIFICANT CHANGE UP (ref 40–120)
ALT FLD-CCNC: 15 U/L — SIGNIFICANT CHANGE UP (ref 4–33)
APAP SERPL-MCNC: < 15 UG/ML — LOW (ref 15–25)
AST SERPL-CCNC: 16 U/L — SIGNIFICANT CHANGE UP (ref 4–32)
BARBITURATES MEASUREMENT: NEGATIVE — SIGNIFICANT CHANGE UP
BASOPHILS # BLD AUTO: 0.03 K/UL — SIGNIFICANT CHANGE UP (ref 0–0.2)
BASOPHILS NFR BLD AUTO: 0.3 % — SIGNIFICANT CHANGE UP (ref 0–2)
BENZODIAZ SERPL-MCNC: NEGATIVE — SIGNIFICANT CHANGE UP
BILIRUB SERPL-MCNC: 0.3 MG/DL — SIGNIFICANT CHANGE UP (ref 0.2–1.2)
BUN SERPL-MCNC: 19 MG/DL — SIGNIFICANT CHANGE UP (ref 7–23)
CALCIUM SERPL-MCNC: 9.9 MG/DL — SIGNIFICANT CHANGE UP (ref 8.4–10.5)
CHLORIDE SERPL-SCNC: 100 MMOL/L — SIGNIFICANT CHANGE UP (ref 98–107)
CO2 SERPL-SCNC: 24 MMOL/L — SIGNIFICANT CHANGE UP (ref 22–31)
CREAT SERPL-MCNC: 0.57 MG/DL — SIGNIFICANT CHANGE UP (ref 0.5–1.3)
EOSINOPHIL # BLD AUTO: 0.68 K/UL — HIGH (ref 0–0.5)
EOSINOPHIL NFR BLD AUTO: 7.5 % — HIGH (ref 0–6)
ETHANOL BLD-MCNC: < 10 MG/DL — SIGNIFICANT CHANGE UP
GLUCOSE SERPL-MCNC: 182 MG/DL — HIGH (ref 70–99)
HCT VFR BLD CALC: 35.9 % — SIGNIFICANT CHANGE UP (ref 34.5–45)
HGB BLD-MCNC: 11.9 G/DL — SIGNIFICANT CHANGE UP (ref 11.5–15.5)
IMM GRANULOCYTES NFR BLD AUTO: 0.1 % — SIGNIFICANT CHANGE UP (ref 0–1.5)
LYMPHOCYTES # BLD AUTO: 2.52 K/UL — SIGNIFICANT CHANGE UP (ref 1–3.3)
LYMPHOCYTES # BLD AUTO: 27.7 % — SIGNIFICANT CHANGE UP (ref 13–44)
MCHC RBC-ENTMCNC: 27.7 PG — SIGNIFICANT CHANGE UP (ref 27–34)
MCHC RBC-ENTMCNC: 33.1 % — SIGNIFICANT CHANGE UP (ref 32–36)
MCV RBC AUTO: 83.7 FL — SIGNIFICANT CHANGE UP (ref 80–100)
MONOCYTES # BLD AUTO: 0.63 K/UL — SIGNIFICANT CHANGE UP (ref 0–0.9)
MONOCYTES NFR BLD AUTO: 6.9 % — SIGNIFICANT CHANGE UP (ref 2–14)
NEUTROPHILS # BLD AUTO: 5.23 K/UL — SIGNIFICANT CHANGE UP (ref 1.8–7.4)
NEUTROPHILS NFR BLD AUTO: 57.5 % — SIGNIFICANT CHANGE UP (ref 43–77)
PLATELET # BLD AUTO: 296 K/UL — SIGNIFICANT CHANGE UP (ref 150–400)
PMV BLD: 10.1 FL — SIGNIFICANT CHANGE UP (ref 7–13)
POTASSIUM SERPL-MCNC: 4.5 MMOL/L — SIGNIFICANT CHANGE UP (ref 3.5–5.3)
POTASSIUM SERPL-SCNC: 4.5 MMOL/L — SIGNIFICANT CHANGE UP (ref 3.5–5.3)
PROT SERPL-MCNC: 7.2 G/DL — SIGNIFICANT CHANGE UP (ref 6–8.3)
RBC # BLD: 4.29 M/UL — SIGNIFICANT CHANGE UP (ref 3.8–5.2)
RBC # FLD: 12.8 % — SIGNIFICANT CHANGE UP (ref 10.3–14.5)
SALICYLATES SERPL-MCNC: < 5 MG/DL — LOW (ref 15–30)
SODIUM SERPL-SCNC: 139 MMOL/L — SIGNIFICANT CHANGE UP (ref 135–145)
TSH SERPL-MCNC: 1.61 UIU/ML — SIGNIFICANT CHANGE UP (ref 0.27–4.2)
WBC # BLD: 9.1 K/UL — SIGNIFICANT CHANGE UP (ref 3.8–10.5)
WBC # FLD AUTO: 9.1 K/UL — SIGNIFICANT CHANGE UP (ref 3.8–10.5)

## 2017-04-18 PROCEDURE — 99232 SBSQ HOSP IP/OBS MODERATE 35: CPT

## 2017-04-18 PROCEDURE — 99223 1ST HOSP IP/OBS HIGH 75: CPT

## 2017-04-18 PROCEDURE — 90792 PSYCH DIAG EVAL W/MED SRVCS: CPT | Mod: GC

## 2017-04-18 PROCEDURE — 99233 SBSQ HOSP IP/OBS HIGH 50: CPT

## 2017-04-18 RX ORDER — SODIUM CHLORIDE 9 MG/ML
1000 INJECTION, SOLUTION INTRAVENOUS
Qty: 0 | Refills: 0 | Status: DISCONTINUED | OUTPATIENT
Start: 2017-04-18 | End: 2017-04-24

## 2017-04-18 RX ORDER — DIVALPROEX SODIUM 500 MG/1
3 TABLET, DELAYED RELEASE ORAL
Qty: 0 | Refills: 0 | COMMUNITY
Start: 2017-04-18

## 2017-04-18 RX ORDER — CARVEDILOL PHOSPHATE 80 MG/1
12.5 CAPSULE, EXTENDED RELEASE ORAL EVERY 12 HOURS
Qty: 0 | Refills: 0 | Status: DISCONTINUED | OUTPATIENT
Start: 2017-04-18 | End: 2017-04-24

## 2017-04-18 RX ORDER — QUETIAPINE FUMARATE 200 MG/1
100 TABLET, FILM COATED ORAL AT BEDTIME
Qty: 0 | Refills: 0 | Status: DISCONTINUED | OUTPATIENT
Start: 2017-04-18 | End: 2017-04-19

## 2017-04-18 RX ORDER — ASPIRIN/CALCIUM CARB/MAGNESIUM 324 MG
81 TABLET ORAL DAILY
Qty: 0 | Refills: 0 | Status: DISCONTINUED | OUTPATIENT
Start: 2017-04-18 | End: 2017-04-24

## 2017-04-18 RX ORDER — INSULIN LISPRO 100/ML
VIAL (ML) SUBCUTANEOUS
Qty: 0 | Refills: 0 | Status: DISCONTINUED | OUTPATIENT
Start: 2017-04-18 | End: 2017-04-24

## 2017-04-18 RX ORDER — METFORMIN HYDROCHLORIDE 850 MG/1
1 TABLET ORAL
Qty: 0 | Refills: 0 | COMMUNITY
Start: 2017-04-18

## 2017-04-18 RX ORDER — HALOPERIDOL DECANOATE 100 MG/ML
6 INJECTION INTRAMUSCULAR EVERY 6 HOURS
Qty: 0 | Refills: 0 | Status: DISCONTINUED | OUTPATIENT
Start: 2017-04-18 | End: 2017-04-19

## 2017-04-18 RX ORDER — INSULIN LISPRO 100/ML
0 VIAL (ML) SUBCUTANEOUS
Qty: 0 | Refills: 0 | COMMUNITY
Start: 2017-04-18

## 2017-04-18 RX ORDER — DEXTROSE 50 % IN WATER 50 %
25 SYRINGE (ML) INTRAVENOUS ONCE
Qty: 0 | Refills: 0 | Status: DISCONTINUED | OUTPATIENT
Start: 2017-04-18 | End: 2017-04-24

## 2017-04-18 RX ORDER — LOSARTAN POTASSIUM 100 MG/1
100 TABLET, FILM COATED ORAL DAILY
Qty: 0 | Refills: 0 | Status: DISCONTINUED | OUTPATIENT
Start: 2017-04-18 | End: 2017-04-24

## 2017-04-18 RX ORDER — DEXTROSE 50 % IN WATER 50 %
12.5 SYRINGE (ML) INTRAVENOUS ONCE
Qty: 0 | Refills: 0 | Status: DISCONTINUED | OUTPATIENT
Start: 2017-04-18 | End: 2017-04-24

## 2017-04-18 RX ORDER — PIOGLITAZONE HYDROCHLORIDE 15 MG/1
1 TABLET ORAL
Qty: 0 | Refills: 0 | COMMUNITY
Start: 2017-04-18

## 2017-04-18 RX ORDER — ACETAMINOPHEN 500 MG
2 TABLET ORAL
Qty: 0 | Refills: 0 | COMMUNITY
Start: 2017-04-18

## 2017-04-18 RX ORDER — LOSARTAN POTASSIUM 100 MG/1
1 TABLET, FILM COATED ORAL
Qty: 0 | Refills: 0 | COMMUNITY
Start: 2017-04-18

## 2017-04-18 RX ORDER — DEXTROSE 50 % IN WATER 50 %
1 SYRINGE (ML) INTRAVENOUS ONCE
Qty: 0 | Refills: 0 | Status: DISCONTINUED | OUTPATIENT
Start: 2017-04-18 | End: 2017-04-24

## 2017-04-18 RX ORDER — ENOXAPARIN SODIUM 100 MG/ML
40 INJECTION SUBCUTANEOUS EVERY 24 HOURS
Qty: 0 | Refills: 0 | Status: DISCONTINUED | OUTPATIENT
Start: 2017-04-18 | End: 2017-04-19

## 2017-04-18 RX ORDER — HALOPERIDOL DECANOATE 100 MG/ML
5 INJECTION INTRAMUSCULAR ONCE
Qty: 0 | Refills: 0 | Status: COMPLETED | OUTPATIENT
Start: 2017-04-18 | End: 2017-04-18

## 2017-04-18 RX ORDER — LACTOBACILLUS ACIDOPHILUS 100MM CELL
1 CAPSULE ORAL
Qty: 0 | Refills: 0 | COMMUNITY
Start: 2017-04-18

## 2017-04-18 RX ORDER — DIVALPROEX SODIUM 500 MG/1
750 TABLET, DELAYED RELEASE ORAL AT BEDTIME
Qty: 0 | Refills: 0 | Status: DISCONTINUED | OUTPATIENT
Start: 2017-04-18 | End: 2017-04-24

## 2017-04-18 RX ORDER — HALOPERIDOL DECANOATE 100 MG/ML
5 INJECTION INTRAMUSCULAR ONCE
Qty: 0 | Refills: 0 | Status: DISCONTINUED | OUTPATIENT
Start: 2017-04-18 | End: 2017-04-18

## 2017-04-18 RX ORDER — DEXTROSE 50 % IN WATER 50 %
1 SYRINGE (ML) INTRAVENOUS
Qty: 0 | Refills: 0 | COMMUNITY
Start: 2017-04-18

## 2017-04-18 RX ORDER — GLUCAGON INJECTION, SOLUTION 0.5 MG/.1ML
1 INJECTION, SOLUTION SUBCUTANEOUS ONCE
Qty: 0 | Refills: 0 | Status: DISCONTINUED | OUTPATIENT
Start: 2017-04-18 | End: 2017-04-24

## 2017-04-18 RX ORDER — KETOCONAZOLE 20 MG/G
1 AEROSOL, FOAM TOPICAL
Qty: 0 | Refills: 0 | COMMUNITY
Start: 2017-04-18

## 2017-04-18 RX ORDER — VANCOMYCIN HCL 1 G
1000 VIAL (EA) INTRAVENOUS EVERY 12 HOURS
Qty: 0 | Refills: 0 | Status: DISCONTINUED | OUTPATIENT
Start: 2017-04-18 | End: 2017-04-21

## 2017-04-18 RX ORDER — GABAPENTIN 400 MG/1
2 CAPSULE ORAL
Qty: 0 | Refills: 0 | COMMUNITY

## 2017-04-18 RX ORDER — QUETIAPINE FUMARATE 200 MG/1
1 TABLET, FILM COATED ORAL
Qty: 0 | Refills: 0 | COMMUNITY
Start: 2017-04-18

## 2017-04-18 RX ADMIN — Medication 1 APPLICATION(S): at 08:27

## 2017-04-18 RX ADMIN — Medication 600 MILLIGRAM(S): at 08:14

## 2017-04-18 RX ADMIN — METFORMIN HYDROCHLORIDE 1000 MILLIGRAM(S): 850 TABLET ORAL at 08:10

## 2017-04-18 RX ADMIN — Medication 2 MILLIGRAM(S): at 15:55

## 2017-04-18 RX ADMIN — CARVEDILOL PHOSPHATE 12.5 MILLIGRAM(S): 80 CAPSULE, EXTENDED RELEASE ORAL at 08:09

## 2017-04-18 RX ADMIN — Medication 20 MILLIGRAM(S): at 08:12

## 2017-04-18 RX ADMIN — HALOPERIDOL DECANOATE 5 MILLIGRAM(S): 100 INJECTION INTRAMUSCULAR at 15:55

## 2017-04-18 RX ADMIN — Medication 600 MILLIGRAM(S): at 12:22

## 2017-04-18 RX ADMIN — KETOCONAZOLE 1 APPLICATION(S): 20 AEROSOL, FOAM TOPICAL at 09:22

## 2017-04-18 RX ADMIN — Medication 1 TABLET(S): at 08:13

## 2017-04-18 RX ADMIN — Medication 2 MILLIGRAM(S): at 23:13

## 2017-04-18 RX ADMIN — HALOPERIDOL DECANOATE 5 MILLIGRAM(S): 100 INJECTION INTRAMUSCULAR at 23:13

## 2017-04-18 NOTE — ED BEHAVIORAL HEALTH ASSESSMENT NOTE - HPI (INCLUDE ILLNESS QUALITY, SEVERITY, DURATION, TIMING, CONTEXT, MODIFYING FACTORS, ASSOCIATED SIGNS AND SYMPTOMS)
66 year old Finnish-American F with a history of depression, currently in outpatient treatment with Dr. Walter, no prior inpatient admissions, no suicide attempts, lives alone since recent separation from her , medical history significant for HTN, HLD, DMII with peripheral neuropathy.  The Patient presented to the ED for a medical evaluation of her cellulitis from [facility] whee she was treated for a manic episode with psychosis.    After her arrival to the ED the Patient became agitated and was given Haldol and Ativan IM. At the time of the interview the Patient is in good behavioral control, but not cooperative with the interview. The Patient states that she feels tired and she doesn't want to answer questions. The Patient keeps asking the writer for the purpose of the interview, her role and her purpose for gathering information. The Patient first states that she doesn't  remember where she came from later she shows her identification band and tells she "came from that hospital" but refuses to give any further details.     The Patient's daughter, Florencia was called for collateral. The Pt's daughter states that the family did not notice any significant change in her behavior since her admission to the unit and that the family suspects that the Patient is able to avoid taking the medication despite the mouth checks.

## 2017-04-18 NOTE — ED PROVIDER NOTE - PHYSICAL EXAMINATION
VSS DENIES PAIN SOB N/V NO S/S OF CARDIAC OR RESPIRATORY DISTRESS PT AMBULATES AND PERFORMS ADL'S INDEPENDENTLY TOLERATING PO INTAKERight Lower Leg cellulitis with quarter size open wound oozing purulent drainage.+ erythema + edema + warmth. Area outlined with marker. +  Pedal pulses Tender to palpation

## 2017-04-18 NOTE — ED CLERICAL - NS ED CLERK NOTE PRE-ARRIVAL INFORMATION; ADDITIONAL PRE-ARRIVAL INFORMATION
Small ulcer right lower leg treated with po Clindamycin X 3 days with no improvement. Hx bipolar, DM,HTN

## 2017-04-18 NOTE — ED BEHAVIORAL HEALTH ASSESSMENT NOTE - DETAILS
agitation and aggression in the setting of the current mood episode admitted to [facility] on 4/11/17 ED was contacted by Dr. Easley before the writer's arrival patient seen in the ED for cellulitis of the R leg patient info posted on [employer] Dr. Easley aware of the admission signed out on [employer]

## 2017-04-18 NOTE — ED PROVIDER NOTE - MEDICAL DECISION MAKING DETAILS
This is a 66 year old Female DM HTN Neuropathy BIBA from [facility] for evaluation of RLL Cellulitis. Patient was agitated on arrival requiring medication and restraints. Dr Easley called from [facility] with background information patient started on Clindamycin for  RLL cellulitis and followed by inpatient hospitalist. With medication management patient Leg is worsening and requires IV antibiotics. Dr Easley states she can not be treated at [facility] with IV ABX and will not be accepted back at [facility] until resolved. Patient is calm and cooperative ambulating independently. patient presents with Right Lower Leg cellulitis with quarter size open wound oozing purulent drainage.+ erythema + edema + warmth. Area outlined with marker. +  Pedal pulses Tender to palpation. No distress noted

## 2017-04-18 NOTE — ED ADULT NURSE REASSESSMENT NOTE - NS ED NURSE REASSESS COMMENT FT1
17:00-Patient taken out of restraints at 16:15 once noted to be calm, pt currently on one to one with [facility] staff, awaiting further MD evaluation, will continue to monitor pt.

## 2017-04-18 NOTE — ED ADULT NURSE NOTE - CHIEF COMPLAINT QUOTE
Pt sent from [facility] for evaluation of rt lower leg cellulitis--despite pt being on PO medications cellulitis has increased with increased redness and drainage

## 2017-04-18 NOTE — ED BEHAVIORAL HEALTH ASSESSMENT NOTE - DESCRIPTION
After her arrival to the ED the Patient became agitated and was given Haldol and Ativan IM. At the time of the interview the Patient is in good behavioral control, but not cooperative with the interview. She was hospitalized on a medical unit at [facility], after falling and suffering leg pain. She was prescribed Gabapentin there, but refused to take it. She was on insulin for diabetes, but was switched to Metformin after refusing to take the insulin. She suffers neuropathy and twitches. see above

## 2017-04-18 NOTE — H&P ADULT. - PROBLEM SELECTOR PLAN 4
- haldol prn - Pt was admitted to [facility] with Bipolar disorder, manic with psychosis   - haldol prn

## 2017-04-18 NOTE — ED PROVIDER NOTE - OBJECTIVE STATEMENT
This is a 66 year old Female DM HTN Neuropathy BIBA from [facility] for evaluation of RLL Cellulitis. Patient was agitated on arrival requiring medication and restraints. Dr Easley called from [facility] with background information patient started on Clindamycin for  RLL cellulitis and followed by inpatient hospitalist. With medication management patient Leg is worsening and requires IV antibiotics. Dr Easley states she can not be treated at [facility] with IV ABX and will not be accepted back at [facility] until resolved. Patient is calm and cooperative ambulating independently. patient presents with Right Lower Leg cellulitis with quarter size open wound oozing purulent drainage.+ erythema + edema + warmth. Area outlined with marker. +  Pedal pulses Tender to palpation. No distress noted. NO AV/HV NO SI/HI Denies chest pain, SOB, N/V/D and fevers, Denies palpitations or diaphoresis. .  Denies ETOH/Illicit drug use. Denies recent falls, trauma and injuries. Denies pain or any other medical complaints.

## 2017-04-18 NOTE — ED BEHAVIORAL HEALTH ASSESSMENT NOTE - CASE SUMMARY
67 yo F with bipolar disorder, currently being treated on inpatient psychiatric unit at [facility] for manic episode with psychotic features, transferred to [facility] ED for cellulitis.  Per EM staff pt requires medical admission for treatment of cellulitis.  Psychotropics+prns as above.  1:1 observation.  Will likely require transfer back to inpatient psychiatry at [facility] once medically cleared.  Pt lacks capacity to leave AMA at this time.  Case signed out to  psychiatry service for follow-up.

## 2017-04-18 NOTE — ED ADULT NURSE REASSESSMENT NOTE - NS ED NURSE REASSESS COMMENT FT1
15:50-Received patient from main ED in 4 point restraints at 1445, pt presents agitated, verbally abusive, restless, one to one observation maintained, pt currently in BH room 6 awaiting MD evaluation, will continue to monitor pt.

## 2017-04-18 NOTE — ED BEHAVIORAL HEALTH ASSESSMENT NOTE - PSYCHIATRIC ISSUES AND PLAN (INCLUDE STANDING AND PRN MEDICATION)
Depakote  mg qhs, quetiapine 100 mg qhs, Ativan 0.5 mg qhs, Haldol 5 mg q6h PO/IM/IV  PRN,  Ativan 2 mg q6h PO/IM/IV.

## 2017-04-18 NOTE — ED BEHAVIORAL HEALTH ASSESSMENT NOTE - RISK ASSESSMENT
Patient is at increased risk for suicidality for having a mood episode, having impulsive behavior, being agitated and paranoid and having a hx of non-compliance with treatment. The Protective factors are being part of a supportive family and identifying reasons to live. At this time the Patient is too symptomatic to engage in safety planning and continues to require CO 1:1 and inpatient level of care for safety and stabilization.

## 2017-04-18 NOTE — ED BEHAVIORAL HEALTH ASSESSMENT NOTE - SUMMARY
66 year old Czech-American F with a history of depression, currently in outpatient treatment with Dr. Walter, no prior inpatient admissions, no suicide attempts, lives alone since recent separation from her , medical history significant for HTN, HLD, DMII with peripheral neuropathy.  The Patient presented to the ED for a medical evaluation of her cellulitis from [facility] whee she was treated for a manic episode with psychosis. The Patient continues to require intensive psychiatric management besides the medical stabilization on the [facility] floors. Patient was added to the C/L on the T-drive. 66 year old Finnish-American F with a history of depression, currently in outpatient treatment with Dr. Walter, no prior inpatient admissions, no suicide attempts, lives alone since recent separation from her , medical history significant for HTN, HLD, DMII with peripheral neuropathy.  The Patient presented to the ED for a medical evaluation of her cellulitis from [facility] whee she was treated for a manic episode with psychosis. The Patient continues to require intensive psychiatric management besides the medical stabilization on the [facility] floors. Patient was signed out to the psychiatry C/L service on the T-drive.

## 2017-04-18 NOTE — ED ADULT TRIAGE NOTE - CHIEF COMPLAINT QUOTE
Pt sent from [facility] for evaluation of rt lower leg cellulitis--despite pt being on PO medications cellulitis has increased with increased redness and drainage

## 2017-04-18 NOTE — ED BEHAVIORAL HEALTH ASSESSMENT NOTE - CURRENT MEDICATION
Insulin sliding scale, Metformin 1000mg BID, Carvedilol 12.5mg BID, losartan 100 mg daily, HCTZ 12.5 mg, ASA 81mg, pioglitazon 15 mg daily, glipizide 20 mg bid before meals, Ativan 0.5 mg qhs, Depakote  mg qhs, quetiapine 100 mg qhs

## 2017-04-18 NOTE — H&P ADULT. - HISTORY OF PRESENT ILLNESS
67 yo M with h/o HTN, DM, neuropathy sent [place] [facility] [place] R lower extremity cellulitis. Pt is poor historian. Per notes, pt was being treated with PO clindamycin for cellulitis but was getting worse so was sent to ED for IV antibiotics. Unclear length of antibiotic treatment. No reported history of fevers or chills. Of note, pt was agitated in ED , placed on constant observation, given haldol and ativan.     ED VS: 150/75  77  97.6  18  98% on RA  Pt given haldol 5mg IM , ativan 2mg IM 65 yo M with h/o HTN, DM, neuropathy sent from [facility] for R lower extremity cellulitis. Pt is poor historian. Per notes, pt was being treated with PO clindamycin for cellulitis but was getting worse so was sent to ED for IV antibiotics. Unclear length of antibiotic treatment. No reported history of fevers or chills. Of note, pt was at [facility] for manic episode with psychosis requiring constant observation. On arrival to ED pt was agitated, given haldol and ativan and placed on constant observation.     ED VS: 150/75  77  97.6  18  98% on RA  Pt given haldol 5mg IM , ativan 2mg IM

## 2017-04-18 NOTE — ED BEHAVIORAL HEALTH ASSESSMENT NOTE - SUICIDE PROTECTIVE FACTORS
Identifies reasons for living/Responsibility to family and others/Supportive social network or family

## 2017-04-18 NOTE — ED BEHAVIORAL HEALTH ASSESSMENT NOTE - AXIS IV
Problems with access to healthcare services/Problem related to social environment/Problems with primary support

## 2017-04-19 PROCEDURE — 93970 EXTREMITY STUDY: CPT | Mod: 26

## 2017-04-19 PROCEDURE — 99233 SBSQ HOSP IP/OBS HIGH 50: CPT

## 2017-04-19 PROCEDURE — 99223 1ST HOSP IP/OBS HIGH 75: CPT

## 2017-04-19 RX ORDER — QUETIAPINE FUMARATE 200 MG/1
150 TABLET, FILM COATED ORAL AT BEDTIME
Qty: 0 | Refills: 0 | Status: DISCONTINUED | OUTPATIENT
Start: 2017-04-19 | End: 2017-04-21

## 2017-04-19 RX ORDER — INSULIN GLARGINE 100 [IU]/ML
5 INJECTION, SOLUTION SUBCUTANEOUS AT BEDTIME
Qty: 0 | Refills: 0 | Status: DISCONTINUED | OUTPATIENT
Start: 2017-04-19 | End: 2017-04-22

## 2017-04-19 RX ORDER — GABAPENTIN 400 MG/1
300 CAPSULE ORAL AT BEDTIME
Qty: 0 | Refills: 0 | Status: DISCONTINUED | OUTPATIENT
Start: 2017-04-19 | End: 2017-04-24

## 2017-04-19 RX ORDER — HALOPERIDOL DECANOATE 100 MG/ML
5 INJECTION INTRAMUSCULAR EVERY 6 HOURS
Qty: 0 | Refills: 0 | Status: DISCONTINUED | OUTPATIENT
Start: 2017-04-19 | End: 2017-04-24

## 2017-04-19 RX ADMIN — Medication 81 MILLIGRAM(S): at 12:02

## 2017-04-19 RX ADMIN — Medication 250 MILLIGRAM(S): at 06:10

## 2017-04-19 RX ADMIN — Medication 3: at 17:16

## 2017-04-19 RX ADMIN — Medication 250 MILLIGRAM(S): at 17:16

## 2017-04-19 RX ADMIN — CARVEDILOL PHOSPHATE 12.5 MILLIGRAM(S): 80 CAPSULE, EXTENDED RELEASE ORAL at 06:09

## 2017-04-19 RX ADMIN — CARVEDILOL PHOSPHATE 12.5 MILLIGRAM(S): 80 CAPSULE, EXTENDED RELEASE ORAL at 17:16

## 2017-04-19 RX ADMIN — Medication 3: at 11:57

## 2017-04-20 LAB
BUN SERPL-MCNC: 24 MG/DL — HIGH (ref 7–23)
CALCIUM SERPL-MCNC: 9.3 MG/DL — SIGNIFICANT CHANGE UP (ref 8.4–10.5)
CHLORIDE SERPL-SCNC: 102 MMOL/L — SIGNIFICANT CHANGE UP (ref 98–107)
CO2 SERPL-SCNC: 23 MMOL/L — SIGNIFICANT CHANGE UP (ref 22–31)
CREAT SERPL-MCNC: 0.62 MG/DL — SIGNIFICANT CHANGE UP (ref 0.5–1.3)
GLUCOSE SERPL-MCNC: 204 MG/DL — HIGH (ref 70–99)
HCT VFR BLD CALC: 35.7 % — SIGNIFICANT CHANGE UP (ref 34.5–45)
HGB BLD-MCNC: 11.7 G/DL — SIGNIFICANT CHANGE UP (ref 11.5–15.5)
MAGNESIUM SERPL-MCNC: 2 MG/DL — SIGNIFICANT CHANGE UP (ref 1.6–2.6)
MCHC RBC-ENTMCNC: 27.4 PG — SIGNIFICANT CHANGE UP (ref 27–34)
MCHC RBC-ENTMCNC: 32.8 % — SIGNIFICANT CHANGE UP (ref 32–36)
MCV RBC AUTO: 83.6 FL — SIGNIFICANT CHANGE UP (ref 80–100)
PHOSPHATE SERPL-MCNC: 3.4 MG/DL — SIGNIFICANT CHANGE UP (ref 2.5–4.5)
PLATELET # BLD AUTO: 301 K/UL — SIGNIFICANT CHANGE UP (ref 150–400)
PMV BLD: 10.3 FL — SIGNIFICANT CHANGE UP (ref 7–13)
POTASSIUM SERPL-MCNC: 4.2 MMOL/L — SIGNIFICANT CHANGE UP (ref 3.5–5.3)
POTASSIUM SERPL-SCNC: 4.2 MMOL/L — SIGNIFICANT CHANGE UP (ref 3.5–5.3)
RBC # BLD: 4.27 M/UL — SIGNIFICANT CHANGE UP (ref 3.8–5.2)
RBC # FLD: 12.8 % — SIGNIFICANT CHANGE UP (ref 10.3–14.5)
SODIUM SERPL-SCNC: 136 MMOL/L — SIGNIFICANT CHANGE UP (ref 135–145)
SPECIMEN SOURCE: SIGNIFICANT CHANGE UP
SPECIMEN SOURCE: SIGNIFICANT CHANGE UP
VANCOMYCIN TROUGH SERPL-MCNC: 10.1 UG/ML — SIGNIFICANT CHANGE UP (ref 10–20)
WBC # BLD: 8.86 K/UL — SIGNIFICANT CHANGE UP (ref 3.8–10.5)
WBC # FLD AUTO: 8.86 K/UL — SIGNIFICANT CHANGE UP (ref 3.8–10.5)

## 2017-04-20 PROCEDURE — 99233 SBSQ HOSP IP/OBS HIGH 50: CPT

## 2017-04-20 RX ORDER — ACETAMINOPHEN 500 MG
650 TABLET ORAL ONCE
Qty: 0 | Refills: 0 | Status: CANCELLED | OUTPATIENT
Start: 2018-03-20 | End: 2017-04-24

## 2017-04-20 RX ORDER — ACETAMINOPHEN 500 MG
650 TABLET ORAL EVERY 6 HOURS
Qty: 0 | Refills: 0 | Status: DISCONTINUED | OUTPATIENT
Start: 2017-04-20 | End: 2017-04-24

## 2017-04-20 RX ORDER — INSULIN LISPRO 100/ML
VIAL (ML) SUBCUTANEOUS AT BEDTIME
Qty: 0 | Refills: 0 | Status: DISCONTINUED | OUTPATIENT
Start: 2017-04-20 | End: 2017-04-24

## 2017-04-20 RX ORDER — PREGABALIN 225 MG/1
100 CAPSULE ORAL DAILY
Qty: 0 | Refills: 0 | Status: DISCONTINUED | OUTPATIENT
Start: 2017-04-20 | End: 2017-04-24

## 2017-04-20 RX ORDER — LACTOBACILLUS ACIDOPHILUS 100MM CELL
1 CAPSULE ORAL EVERY 12 HOURS
Qty: 0 | Refills: 0 | Status: DISCONTINUED | OUTPATIENT
Start: 2017-04-20 | End: 2017-04-24

## 2017-04-20 RX ORDER — BACITRACIN ZINC 500 UNIT/G
1 OINTMENT IN PACKET (EA) TOPICAL DAILY
Qty: 0 | Refills: 0 | Status: DISCONTINUED | OUTPATIENT
Start: 2017-04-20 | End: 2017-04-24

## 2017-04-20 RX ORDER — HALOPERIDOL DECANOATE 100 MG/ML
5 INJECTION INTRAMUSCULAR ONCE
Qty: 0 | Refills: 0 | Status: COMPLETED | OUTPATIENT
Start: 2017-04-20 | End: 2017-04-21

## 2017-04-20 RX ORDER — ACETAMINOPHEN 500 MG
650 TABLET ORAL ONCE
Qty: 0 | Refills: 0 | Status: COMPLETED | OUTPATIENT
Start: 2017-04-20 | End: 2017-04-20

## 2017-04-20 RX ADMIN — Medication 250 MILLIGRAM(S): at 06:42

## 2017-04-20 RX ADMIN — Medication 3: at 12:35

## 2017-04-20 RX ADMIN — Medication 650 MILLIGRAM(S): at 12:04

## 2017-04-20 RX ADMIN — Medication 650 MILLIGRAM(S): at 02:52

## 2017-04-20 RX ADMIN — Medication 650 MILLIGRAM(S): at 18:14

## 2017-04-20 RX ADMIN — Medication 650 MILLIGRAM(S): at 17:44

## 2017-04-20 RX ADMIN — Medication 650 MILLIGRAM(S): at 03:50

## 2017-04-20 RX ADMIN — CARVEDILOL PHOSPHATE 12.5 MILLIGRAM(S): 80 CAPSULE, EXTENDED RELEASE ORAL at 06:42

## 2017-04-20 RX ADMIN — Medication 1 TABLET(S): at 21:16

## 2017-04-20 RX ADMIN — GABAPENTIN 300 MILLIGRAM(S): 400 CAPSULE ORAL at 00:07

## 2017-04-20 RX ADMIN — Medication 1 APPLICATION(S): at 14:00

## 2017-04-20 RX ADMIN — INSULIN GLARGINE 5 UNIT(S): 100 INJECTION, SOLUTION SUBCUTANEOUS at 00:05

## 2017-04-20 RX ADMIN — Medication 650 MILLIGRAM(S): at 11:34

## 2017-04-20 RX ADMIN — Medication 250 MILLIGRAM(S): at 21:16

## 2017-04-20 RX ADMIN — DIVALPROEX SODIUM 750 MILLIGRAM(S): 500 TABLET, DELAYED RELEASE ORAL at 00:06

## 2017-04-20 RX ADMIN — PREGABALIN 100 MICROGRAM(S): 225 CAPSULE ORAL at 14:01

## 2017-04-20 RX ADMIN — QUETIAPINE FUMARATE 150 MILLIGRAM(S): 200 TABLET, FILM COATED ORAL at 00:05

## 2017-04-20 RX ADMIN — Medication 81 MILLIGRAM(S): at 11:36

## 2017-04-21 LAB
BUN SERPL-MCNC: 22 MG/DL — SIGNIFICANT CHANGE UP (ref 7–23)
CALCIUM SERPL-MCNC: 8.3 MG/DL — LOW (ref 8.4–10.5)
CHLORIDE SERPL-SCNC: 106 MMOL/L — SIGNIFICANT CHANGE UP (ref 98–107)
CO2 SERPL-SCNC: 21 MMOL/L — LOW (ref 22–31)
CREAT SERPL-MCNC: 0.55 MG/DL — SIGNIFICANT CHANGE UP (ref 0.5–1.3)
GLUCOSE SERPL-MCNC: 209 MG/DL — HIGH (ref 70–99)
HCT VFR BLD CALC: 34.1 % — LOW (ref 34.5–45)
HGB BLD-MCNC: 11.2 G/DL — LOW (ref 11.5–15.5)
MAGNESIUM SERPL-MCNC: 1.7 MG/DL — SIGNIFICANT CHANGE UP (ref 1.6–2.6)
MCHC RBC-ENTMCNC: 27.4 PG — SIGNIFICANT CHANGE UP (ref 27–34)
MCHC RBC-ENTMCNC: 32.8 % — SIGNIFICANT CHANGE UP (ref 32–36)
MCV RBC AUTO: 83.4 FL — SIGNIFICANT CHANGE UP (ref 80–100)
PHOSPHATE SERPL-MCNC: 2.8 MG/DL — SIGNIFICANT CHANGE UP (ref 2.5–4.5)
PLATELET # BLD AUTO: 311 K/UL — SIGNIFICANT CHANGE UP (ref 150–400)
PMV BLD: 9.8 FL — SIGNIFICANT CHANGE UP (ref 7–13)
POTASSIUM SERPL-MCNC: 3.6 MMOL/L — SIGNIFICANT CHANGE UP (ref 3.5–5.3)
POTASSIUM SERPL-SCNC: 3.6 MMOL/L — SIGNIFICANT CHANGE UP (ref 3.5–5.3)
RBC # BLD: 4.09 M/UL — SIGNIFICANT CHANGE UP (ref 3.8–5.2)
RBC # FLD: 12.7 % — SIGNIFICANT CHANGE UP (ref 10.3–14.5)
SODIUM SERPL-SCNC: 139 MMOL/L — SIGNIFICANT CHANGE UP (ref 135–145)
WBC # BLD: 9.28 K/UL — SIGNIFICANT CHANGE UP (ref 3.8–10.5)
WBC # FLD AUTO: 9.28 K/UL — SIGNIFICANT CHANGE UP (ref 3.8–10.5)

## 2017-04-21 PROCEDURE — 99233 SBSQ HOSP IP/OBS HIGH 50: CPT

## 2017-04-21 RX ORDER — RISPERIDONE 4 MG/1
1 TABLET ORAL
Qty: 0 | Refills: 0 | Status: DISCONTINUED | OUTPATIENT
Start: 2017-04-21 | End: 2017-04-24

## 2017-04-21 RX ORDER — HALOPERIDOL DECANOATE 100 MG/ML
5 INJECTION INTRAMUSCULAR EVERY 6 HOURS
Qty: 0 | Refills: 0 | Status: DISCONTINUED | OUTPATIENT
Start: 2017-04-21 | End: 2017-04-24

## 2017-04-21 RX ORDER — VANCOMYCIN HCL 1 G
1000 VIAL (EA) INTRAVENOUS EVERY 12 HOURS
Qty: 0 | Refills: 0 | Status: DISCONTINUED | OUTPATIENT
Start: 2017-04-21 | End: 2017-04-24

## 2017-04-21 RX ADMIN — CARVEDILOL PHOSPHATE 12.5 MILLIGRAM(S): 80 CAPSULE, EXTENDED RELEASE ORAL at 08:12

## 2017-04-21 RX ADMIN — Medication 2: at 17:54

## 2017-04-21 RX ADMIN — PREGABALIN 100 MICROGRAM(S): 225 CAPSULE ORAL at 12:08

## 2017-04-21 RX ADMIN — Medication 3: at 08:49

## 2017-04-21 RX ADMIN — INSULIN GLARGINE 5 UNIT(S): 100 INJECTION, SOLUTION SUBCUTANEOUS at 22:48

## 2017-04-21 RX ADMIN — LOSARTAN POTASSIUM 100 MILLIGRAM(S): 100 TABLET, FILM COATED ORAL at 08:13

## 2017-04-21 RX ADMIN — INSULIN GLARGINE 5 UNIT(S): 100 INJECTION, SOLUTION SUBCUTANEOUS at 03:41

## 2017-04-21 RX ADMIN — Medication 2: at 12:08

## 2017-04-21 RX ADMIN — Medication 1 TABLET(S): at 17:55

## 2017-04-21 RX ADMIN — RISPERIDONE 1 MILLIGRAM(S): 4 TABLET ORAL at 19:23

## 2017-04-21 RX ADMIN — Medication 1 APPLICATION(S): at 12:09

## 2017-04-21 RX ADMIN — CARVEDILOL PHOSPHATE 12.5 MILLIGRAM(S): 80 CAPSULE, EXTENDED RELEASE ORAL at 17:54

## 2017-04-21 RX ADMIN — Medication 81 MILLIGRAM(S): at 12:08

## 2017-04-21 RX ADMIN — GABAPENTIN 300 MILLIGRAM(S): 400 CAPSULE ORAL at 22:48

## 2017-04-21 RX ADMIN — DIVALPROEX SODIUM 750 MILLIGRAM(S): 500 TABLET, DELAYED RELEASE ORAL at 22:49

## 2017-04-21 RX ADMIN — HALOPERIDOL DECANOATE 5 MILLIGRAM(S): 100 INJECTION INTRAMUSCULAR at 00:20

## 2017-04-21 RX ADMIN — Medication 250 MILLIGRAM(S): at 16:58

## 2017-04-22 LAB — VANCOMYCIN TROUGH SERPL-MCNC: 11 UG/ML — SIGNIFICANT CHANGE UP (ref 10–20)

## 2017-04-22 PROCEDURE — 99233 SBSQ HOSP IP/OBS HIGH 50: CPT

## 2017-04-22 RX ORDER — INSULIN GLARGINE 100 [IU]/ML
10 INJECTION, SOLUTION SUBCUTANEOUS AT BEDTIME
Qty: 0 | Refills: 0 | Status: DISCONTINUED | OUTPATIENT
Start: 2017-04-22 | End: 2017-04-23

## 2017-04-22 RX ORDER — INSULIN GLARGINE 100 [IU]/ML
10 INJECTION, SOLUTION SUBCUTANEOUS AT BEDTIME
Qty: 0 | Refills: 0 | Status: DISCONTINUED | OUTPATIENT
Start: 2017-04-22 | End: 2017-04-22

## 2017-04-22 RX ORDER — INSULIN LISPRO 100/ML
3 VIAL (ML) SUBCUTANEOUS
Qty: 0 | Refills: 0 | Status: DISCONTINUED | OUTPATIENT
Start: 2017-04-22 | End: 2017-04-23

## 2017-04-22 RX ORDER — INSULIN LISPRO 100/ML
4 VIAL (ML) SUBCUTANEOUS
Qty: 0 | Refills: 0 | Status: DISCONTINUED | OUTPATIENT
Start: 2017-04-22 | End: 2017-04-22

## 2017-04-22 RX ADMIN — Medication 1 TABLET(S): at 18:07

## 2017-04-22 RX ADMIN — Medication 250 MILLIGRAM(S): at 10:35

## 2017-04-22 RX ADMIN — Medication 650 MILLIGRAM(S): at 22:58

## 2017-04-22 RX ADMIN — Medication 650 MILLIGRAM(S): at 16:41

## 2017-04-22 RX ADMIN — Medication 2: at 17:47

## 2017-04-22 RX ADMIN — LOSARTAN POTASSIUM 100 MILLIGRAM(S): 100 TABLET, FILM COATED ORAL at 05:31

## 2017-04-22 RX ADMIN — Medication 250 MILLIGRAM(S): at 21:35

## 2017-04-22 RX ADMIN — Medication 650 MILLIGRAM(S): at 17:46

## 2017-04-22 RX ADMIN — Medication 81 MILLIGRAM(S): at 12:14

## 2017-04-22 RX ADMIN — Medication 3 UNIT(S): at 17:48

## 2017-04-22 RX ADMIN — INSULIN GLARGINE 10 UNIT(S): 100 INJECTION, SOLUTION SUBCUTANEOUS at 21:36

## 2017-04-22 RX ADMIN — Medication 650 MILLIGRAM(S): at 23:28

## 2017-04-22 RX ADMIN — RISPERIDONE 1 MILLIGRAM(S): 4 TABLET ORAL at 05:31

## 2017-04-22 RX ADMIN — PREGABALIN 100 MICROGRAM(S): 225 CAPSULE ORAL at 12:14

## 2017-04-22 RX ADMIN — Medication 2: at 08:44

## 2017-04-22 RX ADMIN — Medication 650 MILLIGRAM(S): at 04:40

## 2017-04-22 RX ADMIN — Medication 6: at 12:22

## 2017-04-22 RX ADMIN — GABAPENTIN 300 MILLIGRAM(S): 400 CAPSULE ORAL at 21:35

## 2017-04-22 RX ADMIN — CARVEDILOL PHOSPHATE 12.5 MILLIGRAM(S): 80 CAPSULE, EXTENDED RELEASE ORAL at 18:07

## 2017-04-22 RX ADMIN — RISPERIDONE 1 MILLIGRAM(S): 4 TABLET ORAL at 18:55

## 2017-04-22 RX ADMIN — Medication 1 TABLET(S): at 05:29

## 2017-04-22 RX ADMIN — CARVEDILOL PHOSPHATE 12.5 MILLIGRAM(S): 80 CAPSULE, EXTENDED RELEASE ORAL at 05:28

## 2017-04-22 RX ADMIN — Medication 1 APPLICATION(S): at 12:14

## 2017-04-22 RX ADMIN — Medication 650 MILLIGRAM(S): at 04:10

## 2017-04-22 RX ADMIN — DIVALPROEX SODIUM 750 MILLIGRAM(S): 500 TABLET, DELAYED RELEASE ORAL at 21:35

## 2017-04-22 NOTE — PROVIDER CONTACT NOTE (OTHER) - BACKGROUND
Admitted with cellulits of Right lower extremity. Admitted from [facility]
Admitted with right leg cellulitis
admitted with cellulitis, hx HTN
admitted with right lower extremity cellulitis. Hx HTN

## 2017-04-22 NOTE — PROVIDER CONTACT NOTE (OTHER) - ACTION/TREATMENT ORDERED:
Ok to start abx in the morning
Pass on to day shift
Haldol IVP
Cont to monitor, pass on to dayshift
Will not give any BP medication at this time
Educated family re:diet .Humalog insulin sliding scale given as ordered.Continue yo monitor.

## 2017-04-23 PROCEDURE — 99231 SBSQ HOSP IP/OBS SF/LOW 25: CPT

## 2017-04-23 RX ORDER — INSULIN GLARGINE 100 [IU]/ML
12 INJECTION, SOLUTION SUBCUTANEOUS AT BEDTIME
Qty: 0 | Refills: 0 | Status: DISCONTINUED | OUTPATIENT
Start: 2017-04-23 | End: 2017-04-24

## 2017-04-23 RX ORDER — INSULIN LISPRO 100/ML
5 VIAL (ML) SUBCUTANEOUS
Qty: 0 | Refills: 0 | Status: DISCONTINUED | OUTPATIENT
Start: 2017-04-23 | End: 2017-04-24

## 2017-04-23 RX ADMIN — Medication 3 UNIT(S): at 12:26

## 2017-04-23 RX ADMIN — Medication 650 MILLIGRAM(S): at 22:16

## 2017-04-23 RX ADMIN — Medication 650 MILLIGRAM(S): at 23:00

## 2017-04-23 RX ADMIN — GABAPENTIN 300 MILLIGRAM(S): 400 CAPSULE ORAL at 22:24

## 2017-04-23 RX ADMIN — Medication 1: at 17:45

## 2017-04-23 RX ADMIN — LOSARTAN POTASSIUM 100 MILLIGRAM(S): 100 TABLET, FILM COATED ORAL at 06:01

## 2017-04-23 RX ADMIN — CARVEDILOL PHOSPHATE 12.5 MILLIGRAM(S): 80 CAPSULE, EXTENDED RELEASE ORAL at 17:44

## 2017-04-23 RX ADMIN — PREGABALIN 100 MICROGRAM(S): 225 CAPSULE ORAL at 12:29

## 2017-04-23 RX ADMIN — RISPERIDONE 1 MILLIGRAM(S): 4 TABLET ORAL at 18:25

## 2017-04-23 RX ADMIN — Medication 650 MILLIGRAM(S): at 10:10

## 2017-04-23 RX ADMIN — Medication 1 TABLET(S): at 06:03

## 2017-04-23 RX ADMIN — Medication 3 UNIT(S): at 08:26

## 2017-04-23 RX ADMIN — Medication 81 MILLIGRAM(S): at 12:30

## 2017-04-23 RX ADMIN — Medication 5 UNIT(S): at 17:45

## 2017-04-23 RX ADMIN — Medication 1 TABLET(S): at 17:44

## 2017-04-23 RX ADMIN — Medication 250 MILLIGRAM(S): at 08:33

## 2017-04-23 RX ADMIN — DIVALPROEX SODIUM 750 MILLIGRAM(S): 500 TABLET, DELAYED RELEASE ORAL at 22:24

## 2017-04-23 RX ADMIN — CARVEDILOL PHOSPHATE 12.5 MILLIGRAM(S): 80 CAPSULE, EXTENDED RELEASE ORAL at 06:01

## 2017-04-23 RX ADMIN — RISPERIDONE 1 MILLIGRAM(S): 4 TABLET ORAL at 06:04

## 2017-04-23 RX ADMIN — Medication 650 MILLIGRAM(S): at 09:21

## 2017-04-23 RX ADMIN — Medication 3: at 08:27

## 2017-04-23 RX ADMIN — Medication 250 MILLIGRAM(S): at 21:43

## 2017-04-23 RX ADMIN — Medication 3: at 12:27

## 2017-04-23 RX ADMIN — Medication 1 APPLICATION(S): at 12:29

## 2017-04-24 ENCOUNTER — TRANSCRIPTION ENCOUNTER (OUTPATIENT)
Age: 67
End: 2017-04-24

## 2017-04-24 ENCOUNTER — INPATIENT (INPATIENT)
Facility: HOSPITAL | Age: 67
LOS: 16 days | Discharge: HOME | End: 2017-05-11
Attending: PSYCHIATRY & NEUROLOGY | Admitting: PSYCHIATRY & NEUROLOGY
Payer: MEDICAID

## 2017-04-24 VITALS — HEART RATE: 82 BPM | TEMPERATURE: 98 F | DIASTOLIC BLOOD PRESSURE: 70 MMHG | SYSTOLIC BLOOD PRESSURE: 134 MMHG

## 2017-04-24 VITALS
TEMPERATURE: 99 F | HEART RATE: 88 BPM | RESPIRATION RATE: 18 BRPM | SYSTOLIC BLOOD PRESSURE: 137 MMHG | DIASTOLIC BLOOD PRESSURE: 78 MMHG | OXYGEN SATURATION: 99 %

## 2017-04-24 DIAGNOSIS — F31.10 BIPOLAR DISORDER, CURRENT EPISODE MANIC WITHOUT PSYCHOTIC FEATURES, UNSPECIFIED: ICD-10-CM

## 2017-04-24 LAB
BACTERIA BLD CULT: SIGNIFICANT CHANGE UP
BACTERIA BLD CULT: SIGNIFICANT CHANGE UP

## 2017-04-24 PROCEDURE — 99233 SBSQ HOSP IP/OBS HIGH 50: CPT

## 2017-04-24 PROCEDURE — 99239 HOSP IP/OBS DSCHRG MGMT >30: CPT

## 2017-04-24 PROCEDURE — 99222 1ST HOSP IP/OBS MODERATE 55: CPT

## 2017-04-24 RX ORDER — CARVEDILOL PHOSPHATE 80 MG/1
1 CAPSULE, EXTENDED RELEASE ORAL
Qty: 0 | Refills: 0 | COMMUNITY
Start: 2017-04-24

## 2017-04-24 RX ORDER — INSULIN GLARGINE 100 [IU]/ML
12 INJECTION, SOLUTION SUBCUTANEOUS AT BEDTIME
Qty: 0 | Refills: 0 | Status: DISCONTINUED | OUTPATIENT
Start: 2017-04-24 | End: 2017-04-26

## 2017-04-24 RX ORDER — GABAPENTIN 400 MG/1
1 CAPSULE ORAL
Qty: 0 | Refills: 0 | COMMUNITY
Start: 2017-04-24

## 2017-04-24 RX ORDER — INSULIN LISPRO 100/ML
VIAL (ML) SUBCUTANEOUS AT BEDTIME
Qty: 0 | Refills: 0 | Status: DISCONTINUED | OUTPATIENT
Start: 2017-04-24 | End: 2017-04-28

## 2017-04-24 RX ORDER — RISPERIDONE 4 MG/1
1 TABLET ORAL
Qty: 0 | Refills: 0 | COMMUNITY
Start: 2017-04-24

## 2017-04-24 RX ORDER — ASPIRIN/CALCIUM CARB/MAGNESIUM 324 MG
1 TABLET ORAL
Qty: 0 | Refills: 0 | COMMUNITY

## 2017-04-24 RX ORDER — INSULIN GLARGINE 100 [IU]/ML
12 INJECTION, SOLUTION SUBCUTANEOUS AT BEDTIME
Qty: 0 | Refills: 0 | Status: DISCONTINUED | OUTPATIENT
Start: 2017-04-24 | End: 2017-04-24

## 2017-04-24 RX ORDER — BACITRACIN ZINC 500 UNIT/G
1 OINTMENT IN PACKET (EA) TOPICAL
Qty: 0 | Refills: 0 | COMMUNITY
Start: 2017-04-24

## 2017-04-24 RX ORDER — DEXTROSE 50 % IN WATER 50 %
25 SYRINGE (ML) INTRAVENOUS ONCE
Qty: 0 | Refills: 0 | Status: DISCONTINUED | OUTPATIENT
Start: 2017-04-24 | End: 2017-04-28

## 2017-04-24 RX ORDER — DIVALPROEX SODIUM 500 MG/1
750 TABLET, DELAYED RELEASE ORAL AT BEDTIME
Qty: 0 | Refills: 0 | Status: DISCONTINUED | OUTPATIENT
Start: 2017-04-24 | End: 2017-04-26

## 2017-04-24 RX ORDER — INSULIN LISPRO 100/ML
VIAL (ML) SUBCUTANEOUS
Qty: 0 | Refills: 0 | Status: DISCONTINUED | OUTPATIENT
Start: 2017-04-24 | End: 2017-04-28

## 2017-04-24 RX ORDER — LOSARTAN POTASSIUM 100 MG/1
1 TABLET, FILM COATED ORAL
Qty: 0 | Refills: 0 | COMMUNITY
Start: 2017-04-24

## 2017-04-24 RX ORDER — INSULIN LISPRO 100/ML
5 VIAL (ML) SUBCUTANEOUS
Qty: 0 | Refills: 0 | Status: DISCONTINUED | OUTPATIENT
Start: 2017-04-24 | End: 2017-04-24

## 2017-04-24 RX ORDER — INSULIN GLARGINE 100 [IU]/ML
12 INJECTION, SOLUTION SUBCUTANEOUS
Qty: 0 | Refills: 0 | COMMUNITY
Start: 2017-04-24

## 2017-04-24 RX ORDER — ASPIRIN/CALCIUM CARB/MAGNESIUM 324 MG
81 TABLET ORAL DAILY
Qty: 0 | Refills: 0 | Status: DISCONTINUED | OUTPATIENT
Start: 2017-04-24 | End: 2017-05-11

## 2017-04-24 RX ORDER — LOSARTAN POTASSIUM 100 MG/1
100 TABLET, FILM COATED ORAL DAILY
Qty: 0 | Refills: 0 | Status: DISCONTINUED | OUTPATIENT
Start: 2017-04-24 | End: 2017-05-11

## 2017-04-24 RX ORDER — ACETAMINOPHEN 500 MG
650 TABLET ORAL EVERY 6 HOURS
Qty: 0 | Refills: 0 | Status: DISCONTINUED | OUTPATIENT
Start: 2017-04-24 | End: 2017-04-26

## 2017-04-24 RX ORDER — GLUCAGON INJECTION, SOLUTION 0.5 MG/.1ML
1 INJECTION, SOLUTION SUBCUTANEOUS ONCE
Qty: 0 | Refills: 0 | Status: DISCONTINUED | OUTPATIENT
Start: 2017-04-24 | End: 2017-05-11

## 2017-04-24 RX ORDER — DIVALPROEX SODIUM 500 MG/1
3 TABLET, DELAYED RELEASE ORAL
Qty: 0 | Refills: 0 | COMMUNITY
Start: 2017-04-24

## 2017-04-24 RX ORDER — GABAPENTIN 400 MG/1
300 CAPSULE ORAL AT BEDTIME
Qty: 0 | Refills: 0 | Status: DISCONTINUED | OUTPATIENT
Start: 2017-04-24 | End: 2017-04-26

## 2017-04-24 RX ORDER — RISPERIDONE 4 MG/1
1 TABLET ORAL
Qty: 0 | Refills: 0 | Status: DISCONTINUED | OUTPATIENT
Start: 2017-04-24 | End: 2017-04-25

## 2017-04-24 RX ORDER — DEXTROSE 50 % IN WATER 50 %
12.5 SYRINGE (ML) INTRAVENOUS ONCE
Qty: 0 | Refills: 0 | Status: DISCONTINUED | OUTPATIENT
Start: 2017-04-24 | End: 2017-04-28

## 2017-04-24 RX ORDER — BACITRACIN ZINC 500 UNIT/G
1 OINTMENT IN PACKET (EA) TOPICAL DAILY
Qty: 0 | Refills: 0 | Status: DISCONTINUED | OUTPATIENT
Start: 2017-04-24 | End: 2017-05-11

## 2017-04-24 RX ORDER — INSULIN LISPRO 100/ML
5 VIAL (ML) SUBCUTANEOUS
Qty: 0 | Refills: 0 | COMMUNITY
Start: 2017-04-24

## 2017-04-24 RX ORDER — PREGABALIN 225 MG/1
100 CAPSULE ORAL DAILY
Qty: 0 | Refills: 0 | Status: DISCONTINUED | OUTPATIENT
Start: 2017-04-24 | End: 2017-05-11

## 2017-04-24 RX ORDER — INSULIN LISPRO 100/ML
5 VIAL (ML) SUBCUTANEOUS
Qty: 0 | Refills: 0 | Status: DISCONTINUED | OUTPATIENT
Start: 2017-04-24 | End: 2017-04-28

## 2017-04-24 RX ORDER — CARVEDILOL PHOSPHATE 80 MG/1
12.5 CAPSULE, EXTENDED RELEASE ORAL EVERY 12 HOURS
Qty: 0 | Refills: 0 | Status: DISCONTINUED | OUTPATIENT
Start: 2017-04-24 | End: 2017-05-11

## 2017-04-24 RX ORDER — ASPIRIN/CALCIUM CARB/MAGNESIUM 324 MG
1 TABLET ORAL
Qty: 0 | Refills: 0 | DISCHARGE
Start: 2017-04-24

## 2017-04-24 RX ORDER — SODIUM CHLORIDE 9 MG/ML
1000 INJECTION, SOLUTION INTRAVENOUS
Qty: 0 | Refills: 0 | Status: DISCONTINUED | OUTPATIENT
Start: 2017-04-24 | End: 2017-04-28

## 2017-04-24 RX ORDER — LACTOBACILLUS ACIDOPHILUS 100MM CELL
1 CAPSULE ORAL
Qty: 0 | Refills: 0 | COMMUNITY
Start: 2017-04-24

## 2017-04-24 RX ORDER — PREGABALIN 225 MG/1
1 CAPSULE ORAL
Qty: 0 | Refills: 0 | COMMUNITY
Start: 2017-04-24

## 2017-04-24 RX ORDER — INSULIN LISPRO 100/ML
0 VIAL (ML) SUBCUTANEOUS
Qty: 0 | Refills: 0 | COMMUNITY
Start: 2017-04-24

## 2017-04-24 RX ORDER — DEXTROSE 50 % IN WATER 50 %
1 SYRINGE (ML) INTRAVENOUS ONCE
Qty: 0 | Refills: 0 | Status: DISCONTINUED | OUTPATIENT
Start: 2017-04-24 | End: 2017-04-28

## 2017-04-24 RX ORDER — CARVEDILOL PHOSPHATE 80 MG/1
1 CAPSULE, EXTENDED RELEASE ORAL
Qty: 0 | Refills: 0 | COMMUNITY

## 2017-04-24 RX ADMIN — Medication 6: at 12:01

## 2017-04-24 RX ADMIN — CARVEDILOL PHOSPHATE 12.5 MILLIGRAM(S): 80 CAPSULE, EXTENDED RELEASE ORAL at 06:53

## 2017-04-24 RX ADMIN — CARVEDILOL PHOSPHATE 12.5 MILLIGRAM(S): 80 CAPSULE, EXTENDED RELEASE ORAL at 21:41

## 2017-04-24 RX ADMIN — Medication 81 MILLIGRAM(S): at 12:06

## 2017-04-24 RX ADMIN — Medication 650 MILLIGRAM(S): at 21:40

## 2017-04-24 RX ADMIN — RISPERIDONE 1 MILLIGRAM(S): 4 TABLET ORAL at 21:42

## 2017-04-24 RX ADMIN — Medication 650 MILLIGRAM(S): at 12:12

## 2017-04-24 RX ADMIN — Medication 5 UNIT(S): at 12:02

## 2017-04-24 RX ADMIN — Medication 650 MILLIGRAM(S): at 13:00

## 2017-04-24 RX ADMIN — PREGABALIN 100 MICROGRAM(S): 225 CAPSULE ORAL at 12:06

## 2017-04-24 RX ADMIN — Medication 650 MILLIGRAM(S): at 22:51

## 2017-04-24 RX ADMIN — Medication 1 APPLICATION(S): at 12:06

## 2017-04-24 NOTE — DISCHARGE NOTE ADULT - CARE PLAN
Principal Discharge DX:	Cellulitis of right lower extremity  Goal:	infection resolved.  Instructions for follow-up, activity and diet:	You were treated with IV antibiotics. Course was completed while in hospital.  Secondary Diagnosis:	Agitation  Instructions for follow-up, activity and diet:	Continue medication regimen. Follow up with [facility] team for further evaluation and management.  Secondary Diagnosis:	Essential hypertension  Instructions for follow-up, activity and diet:	Continue medication regimen. Follow up with [facility] team for further evaluation and management. Principal Discharge DX:	Cellulitis of right lower extremity  Goal:	infection resolved.  Instructions for follow-up, activity and diet:	You were treated with IV antibiotics. Course was completed while in hospital.  Secondary Diagnosis:	Agitation  Instructions for follow-up, activity and diet:	Continue medication regimen. Follow up with [facility] team for further evaluation and management.  Secondary Diagnosis:	Essential hypertension  Instructions for follow-up, activity and diet:	Continue medication regimen. Follow up with [facility] team for further evaluation and management.

## 2017-04-24 NOTE — PROVIDER CONTACT NOTE (OTHER) - DATE AND TIME:
19-Apr-2017 00:36
21-Apr-2017 06:38
20-Apr-2017 06:40
20-Apr-2017 23:30
21-Apr-2017 21:55
22-Apr-2017
24-Apr-2017 02:15
24-Apr-2017 07:31

## 2017-04-24 NOTE — DISCHARGE NOTE ADULT - PATIENT PORTAL LINK FT
“You can access the FollowHealth Patient Portal, offered by [facility], by registering with the following website: [email or web address]”

## 2017-04-24 NOTE — PROVIDER CONTACT NOTE (OTHER) - NAME OF MD/NP/PA/DO NOTIFIED:
LION Aviles [id number]
Mariela Hilll ADS [id number]
Mariela cobian ADS [id number]
NP Kareen Rosen
NP Suy[place]
Van Santos ADS [id number]
Celia Tan ADS [id number]

## 2017-04-24 NOTE — DISCHARGE NOTE ADULT - PLAN OF CARE
infection resolved. You were treated with IV antibiotics. Course was completed while in hospital. Continue medication regimen. Follow up with [facility] team for further evaluation and management. Continue medication regimen. Follow up with [facility] team for further evaluation and management.

## 2017-04-24 NOTE — PROVIDER CONTACT NOTE (OTHER) - SITUATION
Pt refused Lantus 12units at bed time. I waited until patient was no longer agitated to try again. Pt was still agitated and refused lantus. States "I do not take 12 units of insulin"

## 2017-04-24 NOTE — DISCHARGE NOTE ADULT - MEDICATION SUMMARY - MEDICATIONS TO TAKE
I will START or STAY ON the medications listed below when I get home from the hospital:    acetaminophen 325 mg oral tablet  -- 2 tab(s) by mouth every 6 hours, As needed, Moderate Pain (4 - 6)  -- Indication: For pain    aspirin 81 mg oral delayed release tablet  -- 1 tab(s) by mouth once a day  -- Indication: For Medication management    losartan 100 mg oral tablet  -- 1 tab(s) by mouth once a day  -- Indication: For Essential hypertension    gabapentin 300 mg oral capsule  -- 1 cap(s) by mouth once a day (at bedtime)  -- Indication: For Neuropathy    divalproex sodium 250 mg oral tablet, extended release  -- 3 tab(s) by mouth once a day (at bedtime)  -- Indication: For Neuropathy    insulin glargine  -- 12 unit(s) subcutaneous once a day (at bedtime)  -- Indication: For Type 2 diabetes mellitus without complication, without long-term current use of insulin    insulin lispro 100 units/mL subcutaneous solution  -- 5 unit(s) subcutaneous 3 times a day (before meals)  -- Indication: For Type 2 diabetes mellitus without complication, without long-term current use of insulin    insulin lispro 100 units/mL subcutaneous solution  --  subcutaneous once a day (at bedtime); 0 Unit(s) if Glucose 0 - 250  1 Unit(s) if Glucose 251 - 300  2 Unit(s) if Glucose 301 - 350  3 Unit(s) if Glucose 351 - 400  4 Unit(s) if Glucose Greater Than 400  -- Indication: For Type 2 diabetes mellitus without complication, without long-term current use of insulin    insulin lispro 100 units/mL subcutaneous solution  --  subcutaneous 3 times a day (before meals); 1 Unit(s) if Glucose 151 - 200  2 Unit(s) if Glucose 201 - 250  3 Unit(s) if Glucose 251 - 300  4 Unit(s) if Glucose 301 - 350  5 Unit(s) if Glucose 351 - 400  6 Unit(s) if Glucose Greater Than 400  -- Indication: For Type 2 diabetes mellitus without complication, without long-term current use of insulin    risperiDONE 1 mg oral tablet, disintegrating  -- 1 tab(s) by mouth 2 times a day  -- Indication: For Agitation    carvedilol 12.5 mg oral tablet  -- 1 tab(s) by mouth every 12 hours  -- Indication: For Essential hypertension    bacitracin 500 units/g topical ointment  -- 1 application on skin once a day  -- Indication: For Cellulitis of right lower extremity    hydroCHLOROthiazide 12.5 mg oral capsule  -- 1 cap(s) by mouth once a day  -- Indication: For Essential hypertension    lactobacillus acidophilus oral capsule  -- 1 cap(s) by mouth 2 times a day  -- Indication: For Cellulitis of right lower extremity    cyanocobalamin 100 mcg oral tablet  -- 1 tab(s) by mouth once a day  -- Indication: For supplement

## 2017-04-24 NOTE — DISCHARGE NOTE ADULT - HOSPITAL COURSE
67 yo M with h/o HTN, DM, neuropathy sent from [facility] for R lower extremity cellulitis. Pt is poor historian. Per notes, pt was being treated with PO clindamycin for cellulitis but was getting worse so was sent to ED for IV antibiotics    Cellulitis of right lower extremity.    - IV vancomycin - Vanco level from 4/22: 11 - no change to dose as indication is cellulitis (pt not bacteremic)  -course completed while hospitalized.    Essential hypertension.    - Monitor BP  - Continue home meds.      Type 2 diabetes mellitus without complication, without long-term current use of insulin.     - Monitor FSG  - ISS.   -4/22-started on premeal insulin  -inc Lantus    Agitation.     - Pt was admitted to [facility] with Bipolar disorder, manic with psychosis   - haldol prn.   -4/22-start Risperda  - Pt to return to [facility] once medically cleared     Neuropathy.  - Continue home meds.     Patient to be transferred to [facility] when bed available

## 2017-04-24 NOTE — PROVIDER CONTACT NOTE (OTHER) - ASSESSMENT
FS at bedtime was 174., No humolog coverage needed,
HR 77, RR 18 O2 100% RA. Pt is alert. Denies chest pain and headache
Pt HR 78 RR 18 O2 100%. No complaints of chest pain or headache
alert and responsive, anxious at times. Maintained on 1:1.Visited by family and brought foods/bread/drinking tea.  Right leg with wound .
denies chest pain and headache. HR 76, RR 18, O2 100% RA.

## 2017-04-24 NOTE — PROVIDER CONTACT NOTE (OTHER) - SITUATION
/87, refusing to take Hydrocholothiazide and losartan, only took carvidelol. Refusing all other morning medication

## 2017-04-24 NOTE — DISCHARGE NOTE ADULT - MEDICATION SUMMARY - MEDICATIONS TO STOP TAKING
I will STOP taking the medications listed below when I get home from the hospital:    glipiZIDE 10 mg oral tablet  -- 2 tab(s) by mouth 2 times a day (before meals)    metFORMIN 1000 mg oral tablet  -- 1 tab(s) by mouth 2 times a day (with meals)    pioglitazone 15 mg oral tablet  -- 1 tab(s) by mouth once a day    QUEtiapine 100 mg oral tablet  -- 1 tab(s) by mouth once a day (at bedtime)    clindamycin 300 mg oral capsule  -- 2 cap(s) by mouth 3 times a day

## 2017-04-25 PROCEDURE — 90853 GROUP PSYCHOTHERAPY: CPT

## 2017-04-25 PROCEDURE — 99223 1ST HOSP IP/OBS HIGH 75: CPT

## 2017-04-25 RX ORDER — RISPERIDONE 4 MG/1
2 TABLET ORAL AT BEDTIME
Qty: 0 | Refills: 0 | Status: DISCONTINUED | OUTPATIENT
Start: 2017-04-25 | End: 2017-04-26

## 2017-04-25 RX ADMIN — Medication: at 16:55

## 2017-04-25 RX ADMIN — Medication 650 MILLIGRAM(S): at 06:56

## 2017-04-25 RX ADMIN — Medication: at 08:55

## 2017-04-25 RX ADMIN — Medication 650 MILLIGRAM(S): at 06:00

## 2017-04-25 RX ADMIN — Medication 81 MILLIGRAM(S): at 08:54

## 2017-04-25 RX ADMIN — PREGABALIN 100 MICROGRAM(S): 225 CAPSULE ORAL at 08:54

## 2017-04-25 RX ADMIN — GABAPENTIN 300 MILLIGRAM(S): 400 CAPSULE ORAL at 21:25

## 2017-04-25 RX ADMIN — RISPERIDONE 2 MILLIGRAM(S): 4 TABLET ORAL at 21:25

## 2017-04-25 RX ADMIN — Medication 5 UNIT(S): at 12:24

## 2017-04-25 RX ADMIN — Medication 5 UNIT(S): at 16:56

## 2017-04-25 RX ADMIN — CARVEDILOL PHOSPHATE 12.5 MILLIGRAM(S): 80 CAPSULE, EXTENDED RELEASE ORAL at 21:25

## 2017-04-25 RX ADMIN — Medication 650 MILLIGRAM(S): at 13:32

## 2017-04-25 RX ADMIN — Medication 650 MILLIGRAM(S): at 19:13

## 2017-04-25 RX ADMIN — Medication 5 UNIT(S): at 08:55

## 2017-04-25 RX ADMIN — CARVEDILOL PHOSPHATE 12.5 MILLIGRAM(S): 80 CAPSULE, EXTENDED RELEASE ORAL at 08:54

## 2017-04-25 RX ADMIN — Medication: at 12:24

## 2017-04-25 RX ADMIN — Medication 1 APPLICATION(S): at 08:54

## 2017-04-25 RX ADMIN — Medication 650 MILLIGRAM(S): at 13:31

## 2017-04-26 PROCEDURE — 99232 SBSQ HOSP IP/OBS MODERATE 35: CPT

## 2017-04-26 RX ORDER — IBUPROFEN 200 MG
400 TABLET ORAL EVERY 6 HOURS
Qty: 0 | Refills: 0 | Status: DISCONTINUED | OUTPATIENT
Start: 2017-04-26 | End: 2017-05-11

## 2017-04-26 RX ORDER — RISPERIDONE 4 MG/1
3 TABLET ORAL AT BEDTIME
Qty: 0 | Refills: 0 | Status: DISCONTINUED | OUTPATIENT
Start: 2017-04-26 | End: 2017-05-01

## 2017-04-26 RX ORDER — ACETAMINOPHEN 500 MG
1000 TABLET ORAL EVERY 8 HOURS
Qty: 0 | Refills: 0 | Status: DISCONTINUED | OUTPATIENT
Start: 2017-04-26 | End: 2017-04-26

## 2017-04-26 RX ORDER — ACETAMINOPHEN 500 MG
650 TABLET ORAL EVERY 6 HOURS
Qty: 0 | Refills: 0 | Status: DISCONTINUED | OUTPATIENT
Start: 2017-04-26 | End: 2017-04-28

## 2017-04-26 RX ORDER — INSULIN GLARGINE 100 [IU]/ML
12 INJECTION, SOLUTION SUBCUTANEOUS AT BEDTIME
Qty: 0 | Refills: 0 | Status: DISCONTINUED | OUTPATIENT
Start: 2017-04-26 | End: 2017-04-28

## 2017-04-26 RX ORDER — INSULIN GLARGINE 100 [IU]/ML
14 INJECTION, SOLUTION SUBCUTANEOUS AT BEDTIME
Qty: 0 | Refills: 0 | Status: DISCONTINUED | OUTPATIENT
Start: 2017-04-26 | End: 2017-04-26

## 2017-04-26 RX ORDER — DIVALPROEX SODIUM 500 MG/1
500 TABLET, DELAYED RELEASE ORAL AT BEDTIME
Qty: 0 | Refills: 0 | Status: DISCONTINUED | OUTPATIENT
Start: 2017-04-26 | End: 2017-04-28

## 2017-04-26 RX ADMIN — PREGABALIN 100 MICROGRAM(S): 225 CAPSULE ORAL at 08:51

## 2017-04-26 RX ADMIN — CARVEDILOL PHOSPHATE 12.5 MILLIGRAM(S): 80 CAPSULE, EXTENDED RELEASE ORAL at 21:52

## 2017-04-26 RX ADMIN — Medication: at 08:52

## 2017-04-26 RX ADMIN — Medication: at 16:35

## 2017-04-26 RX ADMIN — Medication 81 MILLIGRAM(S): at 08:51

## 2017-04-26 RX ADMIN — Medication 1000 MILLIGRAM(S): at 16:01

## 2017-04-26 RX ADMIN — Medication 5 UNIT(S): at 11:33

## 2017-04-26 RX ADMIN — Medication 650 MILLIGRAM(S): at 06:16

## 2017-04-26 RX ADMIN — Medication: at 11:34

## 2017-04-26 RX ADMIN — Medication 1 APPLICATION(S): at 08:51

## 2017-04-26 RX ADMIN — RISPERIDONE 3 MILLIGRAM(S): 4 TABLET ORAL at 21:52

## 2017-04-26 RX ADMIN — Medication 5 UNIT(S): at 08:52

## 2017-04-26 RX ADMIN — Medication 5 UNIT(S): at 16:35

## 2017-04-26 RX ADMIN — INSULIN GLARGINE 12 UNIT(S): 100 INJECTION, SOLUTION SUBCUTANEOUS at 20:40

## 2017-04-26 RX ADMIN — Medication 1000 MILLIGRAM(S): at 17:01

## 2017-04-26 RX ADMIN — CARVEDILOL PHOSPHATE 12.5 MILLIGRAM(S): 80 CAPSULE, EXTENDED RELEASE ORAL at 08:51

## 2017-04-26 RX ADMIN — Medication 650 MILLIGRAM(S): at 07:32

## 2017-04-26 RX ADMIN — LOSARTAN POTASSIUM 100 MILLIGRAM(S): 100 TABLET, FILM COATED ORAL at 08:52

## 2017-04-26 RX ADMIN — DIVALPROEX SODIUM 500 MILLIGRAM(S): 500 TABLET, DELAYED RELEASE ORAL at 21:52

## 2017-04-27 PROCEDURE — 90853 GROUP PSYCHOTHERAPY: CPT

## 2017-04-27 PROCEDURE — 99232 SBSQ HOSP IP/OBS MODERATE 35: CPT | Mod: 25

## 2017-04-27 RX ORDER — SIMETHICONE 80 MG/1
80 TABLET, CHEWABLE ORAL DAILY
Qty: 0 | Refills: 0 | Status: DISCONTINUED | OUTPATIENT
Start: 2017-04-27 | End: 2017-05-05

## 2017-04-27 RX ADMIN — RISPERIDONE 3 MILLIGRAM(S): 4 TABLET ORAL at 21:18

## 2017-04-27 RX ADMIN — Medication 81 MILLIGRAM(S): at 08:19

## 2017-04-27 RX ADMIN — Medication: at 12:55

## 2017-04-27 RX ADMIN — Medication 1 APPLICATION(S): at 08:36

## 2017-04-27 RX ADMIN — Medication 650 MILLIGRAM(S): at 04:10

## 2017-04-27 RX ADMIN — CARVEDILOL PHOSPHATE 12.5 MILLIGRAM(S): 80 CAPSULE, EXTENDED RELEASE ORAL at 21:17

## 2017-04-27 RX ADMIN — Medication 5 UNIT(S): at 08:26

## 2017-04-27 RX ADMIN — Medication 650 MILLIGRAM(S): at 17:23

## 2017-04-27 RX ADMIN — CARVEDILOL PHOSPHATE 12.5 MILLIGRAM(S): 80 CAPSULE, EXTENDED RELEASE ORAL at 08:20

## 2017-04-27 RX ADMIN — Medication 650 MILLIGRAM(S): at 23:05

## 2017-04-27 RX ADMIN — Medication 650 MILLIGRAM(S): at 10:40

## 2017-04-27 RX ADMIN — Medication 650 MILLIGRAM(S): at 16:53

## 2017-04-27 RX ADMIN — PREGABALIN 100 MICROGRAM(S): 225 CAPSULE ORAL at 08:21

## 2017-04-27 RX ADMIN — Medication: at 08:24

## 2017-04-27 RX ADMIN — INSULIN GLARGINE 12 UNIT(S): 100 INJECTION, SOLUTION SUBCUTANEOUS at 21:17

## 2017-04-27 RX ADMIN — Medication 650 MILLIGRAM(S): at 10:10

## 2017-04-27 RX ADMIN — DIVALPROEX SODIUM 500 MILLIGRAM(S): 500 TABLET, DELAYED RELEASE ORAL at 21:17

## 2017-04-27 RX ADMIN — LOSARTAN POTASSIUM 100 MILLIGRAM(S): 100 TABLET, FILM COATED ORAL at 08:36

## 2017-04-27 RX ADMIN — Medication 5 UNIT(S): at 12:56

## 2017-04-27 RX ADMIN — Medication 5 UNIT(S): at 16:40

## 2017-04-27 RX ADMIN — Medication: at 21:18

## 2017-04-28 PROCEDURE — 99232 SBSQ HOSP IP/OBS MODERATE 35: CPT

## 2017-04-28 RX ORDER — ACETAMINOPHEN 500 MG
1000 TABLET ORAL EVERY 8 HOURS
Qty: 0 | Refills: 0 | Status: DISCONTINUED | OUTPATIENT
Start: 2017-04-28 | End: 2017-04-28

## 2017-04-28 RX ORDER — METFORMIN HYDROCHLORIDE 850 MG/1
1000 TABLET ORAL
Qty: 0 | Refills: 0 | Status: DISCONTINUED | OUTPATIENT
Start: 2017-04-28 | End: 2017-05-11

## 2017-04-28 RX ORDER — ACETAMINOPHEN 500 MG
975 TABLET ORAL EVERY 8 HOURS
Qty: 0 | Refills: 0 | Status: DISCONTINUED | OUTPATIENT
Start: 2017-04-28 | End: 2017-05-11

## 2017-04-28 RX ORDER — ACETAMINOPHEN 500 MG
325 TABLET ORAL ONCE
Qty: 0 | Refills: 0 | Status: COMPLETED | OUTPATIENT
Start: 2017-04-28 | End: 2017-04-28

## 2017-04-28 RX ORDER — DIVALPROEX SODIUM 500 MG/1
750 TABLET, DELAYED RELEASE ORAL AT BEDTIME
Qty: 0 | Refills: 0 | Status: DISCONTINUED | OUTPATIENT
Start: 2017-04-28 | End: 2017-05-03

## 2017-04-28 RX ORDER — INSULIN GLARGINE 100 [IU]/ML
14 INJECTION, SOLUTION SUBCUTANEOUS AT BEDTIME
Qty: 0 | Refills: 0 | Status: DISCONTINUED | OUTPATIENT
Start: 2017-04-28 | End: 2017-04-28

## 2017-04-28 RX ADMIN — PREGABALIN 100 MICROGRAM(S): 225 CAPSULE ORAL at 09:15

## 2017-04-28 RX ADMIN — Medication 1 APPLICATION(S): at 09:15

## 2017-04-28 RX ADMIN — Medication 650 MILLIGRAM(S): at 06:11

## 2017-04-28 RX ADMIN — METFORMIN HYDROCHLORIDE 1000 MILLIGRAM(S): 850 TABLET ORAL at 16:52

## 2017-04-28 RX ADMIN — Medication 650 MILLIGRAM(S): at 00:14

## 2017-04-28 RX ADMIN — Medication 650 MILLIGRAM(S): at 04:30

## 2017-04-28 RX ADMIN — CARVEDILOL PHOSPHATE 12.5 MILLIGRAM(S): 80 CAPSULE, EXTENDED RELEASE ORAL at 20:26

## 2017-04-28 RX ADMIN — METFORMIN HYDROCHLORIDE 1000 MILLIGRAM(S): 850 TABLET ORAL at 13:00

## 2017-04-28 RX ADMIN — LOSARTAN POTASSIUM 100 MILLIGRAM(S): 100 TABLET, FILM COATED ORAL at 09:15

## 2017-04-28 RX ADMIN — Medication 81 MILLIGRAM(S): at 09:15

## 2017-04-28 RX ADMIN — RISPERIDONE 3 MILLIGRAM(S): 4 TABLET ORAL at 20:26

## 2017-04-28 RX ADMIN — DIVALPROEX SODIUM 750 MILLIGRAM(S): 500 TABLET, DELAYED RELEASE ORAL at 20:26

## 2017-04-28 RX ADMIN — Medication 325 MILLIGRAM(S): at 10:59

## 2017-04-28 RX ADMIN — Medication 975 MILLIGRAM(S): at 21:30

## 2017-04-28 RX ADMIN — Medication 650 MILLIGRAM(S): at 11:06

## 2017-04-28 RX ADMIN — Medication 325 MILLIGRAM(S): at 11:29

## 2017-04-28 RX ADMIN — CARVEDILOL PHOSPHATE 12.5 MILLIGRAM(S): 80 CAPSULE, EXTENDED RELEASE ORAL at 09:15

## 2017-04-28 RX ADMIN — Medication 650 MILLIGRAM(S): at 10:36

## 2017-04-28 RX ADMIN — Medication 975 MILLIGRAM(S): at 20:26

## 2017-04-29 RX ADMIN — Medication 975 MILLIGRAM(S): at 05:47

## 2017-04-29 RX ADMIN — METFORMIN HYDROCHLORIDE 1000 MILLIGRAM(S): 850 TABLET ORAL at 16:38

## 2017-04-29 RX ADMIN — Medication 975 MILLIGRAM(S): at 04:00

## 2017-04-29 RX ADMIN — LOSARTAN POTASSIUM 100 MILLIGRAM(S): 100 TABLET, FILM COATED ORAL at 09:48

## 2017-04-29 RX ADMIN — DIVALPROEX SODIUM 750 MILLIGRAM(S): 500 TABLET, DELAYED RELEASE ORAL at 21:45

## 2017-04-29 RX ADMIN — Medication 81 MILLIGRAM(S): at 09:47

## 2017-04-29 RX ADMIN — Medication 975 MILLIGRAM(S): at 17:42

## 2017-04-29 RX ADMIN — CARVEDILOL PHOSPHATE 12.5 MILLIGRAM(S): 80 CAPSULE, EXTENDED RELEASE ORAL at 21:45

## 2017-04-29 RX ADMIN — Medication 1 APPLICATION(S): at 08:58

## 2017-04-29 RX ADMIN — Medication 975 MILLIGRAM(S): at 18:07

## 2017-04-29 RX ADMIN — Medication 2.5 MILLIGRAM(S): at 08:58

## 2017-04-29 RX ADMIN — METFORMIN HYDROCHLORIDE 1000 MILLIGRAM(S): 850 TABLET ORAL at 08:58

## 2017-04-29 RX ADMIN — RISPERIDONE 3 MILLIGRAM(S): 4 TABLET ORAL at 21:45

## 2017-04-29 RX ADMIN — CARVEDILOL PHOSPHATE 12.5 MILLIGRAM(S): 80 CAPSULE, EXTENDED RELEASE ORAL at 08:58

## 2017-04-29 RX ADMIN — PREGABALIN 100 MICROGRAM(S): 225 CAPSULE ORAL at 08:58

## 2017-04-30 RX ADMIN — SIMETHICONE 80 MILLIGRAM(S): 80 TABLET, CHEWABLE ORAL at 10:06

## 2017-04-30 RX ADMIN — Medication 2.5 MILLIGRAM(S): at 10:06

## 2017-04-30 RX ADMIN — METFORMIN HYDROCHLORIDE 1000 MILLIGRAM(S): 850 TABLET ORAL at 10:06

## 2017-04-30 RX ADMIN — Medication 1 APPLICATION(S): at 10:06

## 2017-04-30 RX ADMIN — DIVALPROEX SODIUM 750 MILLIGRAM(S): 500 TABLET, DELAYED RELEASE ORAL at 20:45

## 2017-04-30 RX ADMIN — Medication 975 MILLIGRAM(S): at 17:37

## 2017-04-30 RX ADMIN — CARVEDILOL PHOSPHATE 12.5 MILLIGRAM(S): 80 CAPSULE, EXTENDED RELEASE ORAL at 20:44

## 2017-04-30 RX ADMIN — Medication 975 MILLIGRAM(S): at 17:35

## 2017-04-30 RX ADMIN — RISPERIDONE 3 MILLIGRAM(S): 4 TABLET ORAL at 20:45

## 2017-04-30 RX ADMIN — METFORMIN HYDROCHLORIDE 1000 MILLIGRAM(S): 850 TABLET ORAL at 16:10

## 2017-04-30 RX ADMIN — PREGABALIN 100 MICROGRAM(S): 225 CAPSULE ORAL at 10:06

## 2017-04-30 RX ADMIN — Medication 81 MILLIGRAM(S): at 10:06

## 2017-04-30 RX ADMIN — LOSARTAN POTASSIUM 100 MILLIGRAM(S): 100 TABLET, FILM COATED ORAL at 10:06

## 2017-04-30 RX ADMIN — CARVEDILOL PHOSPHATE 12.5 MILLIGRAM(S): 80 CAPSULE, EXTENDED RELEASE ORAL at 10:06

## 2017-05-01 PROCEDURE — 99232 SBSQ HOSP IP/OBS MODERATE 35: CPT

## 2017-05-01 RX ORDER — RISPERIDONE 4 MG/1
4 TABLET ORAL AT BEDTIME
Qty: 0 | Refills: 0 | Status: DISCONTINUED | OUTPATIENT
Start: 2017-05-01 | End: 2017-05-11

## 2017-05-01 RX ADMIN — RISPERIDONE 4 MILLIGRAM(S): 4 TABLET ORAL at 20:58

## 2017-05-01 RX ADMIN — LOSARTAN POTASSIUM 100 MILLIGRAM(S): 100 TABLET, FILM COATED ORAL at 09:35

## 2017-05-01 RX ADMIN — Medication 81 MILLIGRAM(S): at 09:07

## 2017-05-01 RX ADMIN — CARVEDILOL PHOSPHATE 12.5 MILLIGRAM(S): 80 CAPSULE, EXTENDED RELEASE ORAL at 09:07

## 2017-05-01 RX ADMIN — CARVEDILOL PHOSPHATE 12.5 MILLIGRAM(S): 80 CAPSULE, EXTENDED RELEASE ORAL at 20:58

## 2017-05-01 RX ADMIN — Medication 975 MILLIGRAM(S): at 23:45

## 2017-05-01 RX ADMIN — Medication 2.5 MILLIGRAM(S): at 09:08

## 2017-05-01 RX ADMIN — SIMETHICONE 80 MILLIGRAM(S): 80 TABLET, CHEWABLE ORAL at 09:51

## 2017-05-01 RX ADMIN — Medication 1 APPLICATION(S): at 09:35

## 2017-05-01 RX ADMIN — METFORMIN HYDROCHLORIDE 1000 MILLIGRAM(S): 850 TABLET ORAL at 09:06

## 2017-05-01 RX ADMIN — METFORMIN HYDROCHLORIDE 1000 MILLIGRAM(S): 850 TABLET ORAL at 17:01

## 2017-05-01 RX ADMIN — Medication 10 MILLIGRAM(S): at 17:01

## 2017-05-01 RX ADMIN — DIVALPROEX SODIUM 750 MILLIGRAM(S): 500 TABLET, DELAYED RELEASE ORAL at 20:58

## 2017-05-01 RX ADMIN — PREGABALIN 100 MICROGRAM(S): 225 CAPSULE ORAL at 09:07

## 2017-05-02 PROCEDURE — 99232 SBSQ HOSP IP/OBS MODERATE 35: CPT

## 2017-05-02 RX ADMIN — Medication 975 MILLIGRAM(S): at 09:06

## 2017-05-02 RX ADMIN — PREGABALIN 100 MICROGRAM(S): 225 CAPSULE ORAL at 08:53

## 2017-05-02 RX ADMIN — Medication 10 MILLIGRAM(S): at 16:54

## 2017-05-02 RX ADMIN — Medication 400 MILLIGRAM(S): at 14:22

## 2017-05-02 RX ADMIN — Medication 975 MILLIGRAM(S): at 21:07

## 2017-05-02 RX ADMIN — CARVEDILOL PHOSPHATE 12.5 MILLIGRAM(S): 80 CAPSULE, EXTENDED RELEASE ORAL at 08:17

## 2017-05-02 RX ADMIN — METFORMIN HYDROCHLORIDE 1000 MILLIGRAM(S): 850 TABLET ORAL at 16:55

## 2017-05-02 RX ADMIN — Medication 10 MILLIGRAM(S): at 07:56

## 2017-05-02 RX ADMIN — LOSARTAN POTASSIUM 100 MILLIGRAM(S): 100 TABLET, FILM COATED ORAL at 08:17

## 2017-05-02 RX ADMIN — Medication 1 APPLICATION(S): at 08:52

## 2017-05-02 RX ADMIN — Medication 975 MILLIGRAM(S): at 22:09

## 2017-05-02 RX ADMIN — Medication 81 MILLIGRAM(S): at 08:52

## 2017-05-02 RX ADMIN — SIMETHICONE 80 MILLIGRAM(S): 80 TABLET, CHEWABLE ORAL at 09:07

## 2017-05-02 RX ADMIN — Medication 975 MILLIGRAM(S): at 01:08

## 2017-05-02 RX ADMIN — CARVEDILOL PHOSPHATE 12.5 MILLIGRAM(S): 80 CAPSULE, EXTENDED RELEASE ORAL at 20:18

## 2017-05-02 RX ADMIN — Medication 400 MILLIGRAM(S): at 18:17

## 2017-05-02 RX ADMIN — RISPERIDONE 4 MILLIGRAM(S): 4 TABLET ORAL at 20:19

## 2017-05-02 RX ADMIN — Medication 975 MILLIGRAM(S): at 12:42

## 2017-05-02 RX ADMIN — DIVALPROEX SODIUM 750 MILLIGRAM(S): 500 TABLET, DELAYED RELEASE ORAL at 20:19

## 2017-05-02 RX ADMIN — METFORMIN HYDROCHLORIDE 1000 MILLIGRAM(S): 850 TABLET ORAL at 08:17

## 2017-05-03 LAB
ALBUMIN SERPL ELPH-MCNC: 3.8 G/DL — SIGNIFICANT CHANGE UP (ref 3.3–5)
ALP SERPL-CCNC: 65 U/L — SIGNIFICANT CHANGE UP (ref 40–120)
ALT FLD-CCNC: 22 U/L — SIGNIFICANT CHANGE UP (ref 4–33)
AST SERPL-CCNC: 18 U/L — SIGNIFICANT CHANGE UP (ref 4–32)
BASOPHILS # BLD AUTO: 0.02 K/UL — SIGNIFICANT CHANGE UP (ref 0–0.2)
BASOPHILS NFR BLD AUTO: 0.2 % — SIGNIFICANT CHANGE UP (ref 0–2)
BILIRUB SERPL-MCNC: 0.2 MG/DL — SIGNIFICANT CHANGE UP (ref 0.2–1.2)
BUN SERPL-MCNC: 21 MG/DL — SIGNIFICANT CHANGE UP (ref 7–23)
CALCIUM SERPL-MCNC: 9.5 MG/DL — SIGNIFICANT CHANGE UP (ref 8.4–10.5)
CHLORIDE SERPL-SCNC: 104 MMOL/L — SIGNIFICANT CHANGE UP (ref 98–107)
CO2 SERPL-SCNC: 25 MMOL/L — SIGNIFICANT CHANGE UP (ref 22–31)
CREAT SERPL-MCNC: 0.58 MG/DL — SIGNIFICANT CHANGE UP (ref 0.5–1.3)
EOSINOPHIL # BLD AUTO: 0.48 K/UL — SIGNIFICANT CHANGE UP (ref 0–0.5)
EOSINOPHIL NFR BLD AUTO: 5.6 % — SIGNIFICANT CHANGE UP (ref 0–6)
GLUCOSE SERPL-MCNC: 201 MG/DL — HIGH (ref 70–99)
HCT VFR BLD CALC: 32.7 % — LOW (ref 34.5–45)
HGB BLD-MCNC: 10.5 G/DL — LOW (ref 11.5–15.5)
IMM GRANULOCYTES NFR BLD AUTO: 0.2 % — SIGNIFICANT CHANGE UP (ref 0–1.5)
LYMPHOCYTES # BLD AUTO: 2.28 K/UL — SIGNIFICANT CHANGE UP (ref 1–3.3)
LYMPHOCYTES # BLD AUTO: 26.6 % — SIGNIFICANT CHANGE UP (ref 13–44)
MCHC RBC-ENTMCNC: 27.3 PG — SIGNIFICANT CHANGE UP (ref 27–34)
MCHC RBC-ENTMCNC: 32.1 % — SIGNIFICANT CHANGE UP (ref 32–36)
MCV RBC AUTO: 84.9 FL — SIGNIFICANT CHANGE UP (ref 80–100)
MONOCYTES # BLD AUTO: 0.83 K/UL — SIGNIFICANT CHANGE UP (ref 0–0.9)
MONOCYTES NFR BLD AUTO: 9.7 % — SIGNIFICANT CHANGE UP (ref 2–14)
NEUTROPHILS # BLD AUTO: 4.95 K/UL — SIGNIFICANT CHANGE UP (ref 1.8–7.4)
NEUTROPHILS NFR BLD AUTO: 57.7 % — SIGNIFICANT CHANGE UP (ref 43–77)
PLATELET # BLD AUTO: 301 K/UL — SIGNIFICANT CHANGE UP (ref 150–400)
PMV BLD: 10.1 FL — SIGNIFICANT CHANGE UP (ref 7–13)
POTASSIUM SERPL-MCNC: 4.5 MMOL/L — SIGNIFICANT CHANGE UP (ref 3.5–5.3)
POTASSIUM SERPL-SCNC: 4.5 MMOL/L — SIGNIFICANT CHANGE UP (ref 3.5–5.3)
PROT SERPL-MCNC: 6.9 G/DL — SIGNIFICANT CHANGE UP (ref 6–8.3)
RBC # BLD: 3.85 M/UL — SIGNIFICANT CHANGE UP (ref 3.8–5.2)
RBC # FLD: 13 % — SIGNIFICANT CHANGE UP (ref 10.3–14.5)
SODIUM SERPL-SCNC: 142 MMOL/L — SIGNIFICANT CHANGE UP (ref 135–145)
VALPROATE SERPL-MCNC: 24.9 UG/ML — LOW (ref 50–100)
WBC # BLD: 8.58 K/UL — SIGNIFICANT CHANGE UP (ref 3.8–10.5)
WBC # FLD AUTO: 8.58 K/UL — SIGNIFICANT CHANGE UP (ref 3.8–10.5)

## 2017-05-03 PROCEDURE — 99232 SBSQ HOSP IP/OBS MODERATE 35: CPT

## 2017-05-03 RX ORDER — DIVALPROEX SODIUM 500 MG/1
1000 TABLET, DELAYED RELEASE ORAL AT BEDTIME
Qty: 0 | Refills: 0 | Status: DISCONTINUED | OUTPATIENT
Start: 2017-05-03 | End: 2017-05-11

## 2017-05-03 RX ADMIN — Medication 81 MILLIGRAM(S): at 08:53

## 2017-05-03 RX ADMIN — Medication 975 MILLIGRAM(S): at 23:25

## 2017-05-03 RX ADMIN — SIMETHICONE 80 MILLIGRAM(S): 80 TABLET, CHEWABLE ORAL at 08:54

## 2017-05-03 RX ADMIN — CARVEDILOL PHOSPHATE 12.5 MILLIGRAM(S): 80 CAPSULE, EXTENDED RELEASE ORAL at 08:54

## 2017-05-03 RX ADMIN — METFORMIN HYDROCHLORIDE 1000 MILLIGRAM(S): 850 TABLET ORAL at 08:54

## 2017-05-03 RX ADMIN — Medication 1 APPLICATION(S): at 08:54

## 2017-05-03 RX ADMIN — RISPERIDONE 4 MILLIGRAM(S): 4 TABLET ORAL at 21:42

## 2017-05-03 RX ADMIN — PREGABALIN 100 MICROGRAM(S): 225 CAPSULE ORAL at 08:54

## 2017-05-03 RX ADMIN — Medication 10 MILLIGRAM(S): at 08:54

## 2017-05-03 RX ADMIN — Medication 10 MILLIGRAM(S): at 16:47

## 2017-05-03 RX ADMIN — LOSARTAN POTASSIUM 100 MILLIGRAM(S): 100 TABLET, FILM COATED ORAL at 08:54

## 2017-05-03 RX ADMIN — METFORMIN HYDROCHLORIDE 1000 MILLIGRAM(S): 850 TABLET ORAL at 16:47

## 2017-05-03 RX ADMIN — CARVEDILOL PHOSPHATE 12.5 MILLIGRAM(S): 80 CAPSULE, EXTENDED RELEASE ORAL at 21:42

## 2017-05-03 RX ADMIN — DIVALPROEX SODIUM 1000 MILLIGRAM(S): 500 TABLET, DELAYED RELEASE ORAL at 21:42

## 2017-05-04 PROCEDURE — 99232 SBSQ HOSP IP/OBS MODERATE 35: CPT

## 2017-05-04 RX ADMIN — METFORMIN HYDROCHLORIDE 1000 MILLIGRAM(S): 850 TABLET ORAL at 17:08

## 2017-05-04 RX ADMIN — Medication 975 MILLIGRAM(S): at 23:31

## 2017-05-04 RX ADMIN — DIVALPROEX SODIUM 1000 MILLIGRAM(S): 500 TABLET, DELAYED RELEASE ORAL at 20:37

## 2017-05-04 RX ADMIN — METFORMIN HYDROCHLORIDE 1000 MILLIGRAM(S): 850 TABLET ORAL at 08:29

## 2017-05-04 RX ADMIN — CARVEDILOL PHOSPHATE 12.5 MILLIGRAM(S): 80 CAPSULE, EXTENDED RELEASE ORAL at 08:29

## 2017-05-04 RX ADMIN — Medication 1 APPLICATION(S): at 09:02

## 2017-05-04 RX ADMIN — Medication 975 MILLIGRAM(S): at 14:50

## 2017-05-04 RX ADMIN — PREGABALIN 100 MICROGRAM(S): 225 CAPSULE ORAL at 08:32

## 2017-05-04 RX ADMIN — Medication 10 MILLIGRAM(S): at 08:31

## 2017-05-04 RX ADMIN — LOSARTAN POTASSIUM 100 MILLIGRAM(S): 100 TABLET, FILM COATED ORAL at 08:29

## 2017-05-04 RX ADMIN — Medication 975 MILLIGRAM(S): at 14:58

## 2017-05-04 RX ADMIN — Medication 10 MILLIGRAM(S): at 17:08

## 2017-05-04 RX ADMIN — CARVEDILOL PHOSPHATE 12.5 MILLIGRAM(S): 80 CAPSULE, EXTENDED RELEASE ORAL at 20:37

## 2017-05-04 RX ADMIN — RISPERIDONE 4 MILLIGRAM(S): 4 TABLET ORAL at 20:37

## 2017-05-04 RX ADMIN — Medication 81 MILLIGRAM(S): at 08:31

## 2017-05-04 RX ADMIN — Medication 975 MILLIGRAM(S): at 00:15

## 2017-05-04 RX ADMIN — Medication 975 MILLIGRAM(S): at 22:35

## 2017-05-05 PROCEDURE — 99232 SBSQ HOSP IP/OBS MODERATE 35: CPT | Mod: 25

## 2017-05-05 PROCEDURE — 90853 GROUP PSYCHOTHERAPY: CPT

## 2017-05-05 RX ORDER — POLYETHYLENE GLYCOL 3350 17 G/17G
17 POWDER, FOR SOLUTION ORAL DAILY
Qty: 0 | Refills: 0 | Status: DISCONTINUED | OUTPATIENT
Start: 2017-05-05 | End: 2017-05-11

## 2017-05-05 RX ORDER — SIMETHICONE 80 MG/1
160 TABLET, CHEWABLE ORAL
Qty: 0 | Refills: 0 | Status: DISCONTINUED | OUTPATIENT
Start: 2017-05-05 | End: 2017-05-11

## 2017-05-05 RX ADMIN — DIVALPROEX SODIUM 1000 MILLIGRAM(S): 500 TABLET, DELAYED RELEASE ORAL at 20:12

## 2017-05-05 RX ADMIN — Medication 975 MILLIGRAM(S): at 09:48

## 2017-05-05 RX ADMIN — LOSARTAN POTASSIUM 100 MILLIGRAM(S): 100 TABLET, FILM COATED ORAL at 08:34

## 2017-05-05 RX ADMIN — METFORMIN HYDROCHLORIDE 1000 MILLIGRAM(S): 850 TABLET ORAL at 16:47

## 2017-05-05 RX ADMIN — RISPERIDONE 4 MILLIGRAM(S): 4 TABLET ORAL at 20:12

## 2017-05-05 RX ADMIN — CARVEDILOL PHOSPHATE 12.5 MILLIGRAM(S): 80 CAPSULE, EXTENDED RELEASE ORAL at 20:12

## 2017-05-05 RX ADMIN — METFORMIN HYDROCHLORIDE 1000 MILLIGRAM(S): 850 TABLET ORAL at 08:34

## 2017-05-05 RX ADMIN — Medication 81 MILLIGRAM(S): at 08:33

## 2017-05-05 RX ADMIN — PREGABALIN 100 MICROGRAM(S): 225 CAPSULE ORAL at 08:33

## 2017-05-05 RX ADMIN — Medication 10 MILLIGRAM(S): at 08:33

## 2017-05-05 RX ADMIN — Medication 975 MILLIGRAM(S): at 10:50

## 2017-05-05 RX ADMIN — Medication 10 MILLIGRAM(S): at 16:47

## 2017-05-05 RX ADMIN — SIMETHICONE 160 MILLIGRAM(S): 80 TABLET, CHEWABLE ORAL at 20:13

## 2017-05-05 RX ADMIN — SIMETHICONE 80 MILLIGRAM(S): 80 TABLET, CHEWABLE ORAL at 09:48

## 2017-05-05 RX ADMIN — CARVEDILOL PHOSPHATE 12.5 MILLIGRAM(S): 80 CAPSULE, EXTENDED RELEASE ORAL at 08:33

## 2017-05-05 RX ADMIN — Medication 975 MILLIGRAM(S): at 20:10

## 2017-05-05 RX ADMIN — Medication 975 MILLIGRAM(S): at 22:05

## 2017-05-06 RX ADMIN — LOSARTAN POTASSIUM 100 MILLIGRAM(S): 100 TABLET, FILM COATED ORAL at 08:48

## 2017-05-06 RX ADMIN — POLYETHYLENE GLYCOL 3350 17 GRAM(S): 17 POWDER, FOR SOLUTION ORAL at 08:48

## 2017-05-06 RX ADMIN — SIMETHICONE 160 MILLIGRAM(S): 80 TABLET, CHEWABLE ORAL at 21:20

## 2017-05-06 RX ADMIN — Medication 10 MILLIGRAM(S): at 08:48

## 2017-05-06 RX ADMIN — Medication 81 MILLIGRAM(S): at 08:48

## 2017-05-06 RX ADMIN — Medication 10 MILLIGRAM(S): at 16:40

## 2017-05-06 RX ADMIN — METFORMIN HYDROCHLORIDE 1000 MILLIGRAM(S): 850 TABLET ORAL at 16:40

## 2017-05-06 RX ADMIN — SIMETHICONE 160 MILLIGRAM(S): 80 TABLET, CHEWABLE ORAL at 08:48

## 2017-05-06 RX ADMIN — Medication 1 APPLICATION(S): at 08:48

## 2017-05-06 RX ADMIN — Medication 975 MILLIGRAM(S): at 08:55

## 2017-05-06 RX ADMIN — CARVEDILOL PHOSPHATE 12.5 MILLIGRAM(S): 80 CAPSULE, EXTENDED RELEASE ORAL at 21:20

## 2017-05-06 RX ADMIN — METFORMIN HYDROCHLORIDE 1000 MILLIGRAM(S): 850 TABLET ORAL at 08:48

## 2017-05-06 RX ADMIN — CARVEDILOL PHOSPHATE 12.5 MILLIGRAM(S): 80 CAPSULE, EXTENDED RELEASE ORAL at 08:48

## 2017-05-06 RX ADMIN — RISPERIDONE 4 MILLIGRAM(S): 4 TABLET ORAL at 21:20

## 2017-05-06 RX ADMIN — Medication 975 MILLIGRAM(S): at 12:42

## 2017-05-06 RX ADMIN — DIVALPROEX SODIUM 1000 MILLIGRAM(S): 500 TABLET, DELAYED RELEASE ORAL at 21:21

## 2017-05-06 RX ADMIN — PREGABALIN 100 MICROGRAM(S): 225 CAPSULE ORAL at 08:48

## 2017-05-07 RX ADMIN — Medication 975 MILLIGRAM(S): at 17:36

## 2017-05-07 RX ADMIN — Medication 975 MILLIGRAM(S): at 09:17

## 2017-05-07 RX ADMIN — SIMETHICONE 160 MILLIGRAM(S): 80 TABLET, CHEWABLE ORAL at 09:17

## 2017-05-07 RX ADMIN — CARVEDILOL PHOSPHATE 12.5 MILLIGRAM(S): 80 CAPSULE, EXTENDED RELEASE ORAL at 20:46

## 2017-05-07 RX ADMIN — Medication 10 MILLIGRAM(S): at 09:00

## 2017-05-07 RX ADMIN — Medication 1 APPLICATION(S): at 09:17

## 2017-05-07 RX ADMIN — Medication 81 MILLIGRAM(S): at 09:00

## 2017-05-07 RX ADMIN — DIVALPROEX SODIUM 1000 MILLIGRAM(S): 500 TABLET, DELAYED RELEASE ORAL at 20:46

## 2017-05-07 RX ADMIN — POLYETHYLENE GLYCOL 3350 17 GRAM(S): 17 POWDER, FOR SOLUTION ORAL at 09:17

## 2017-05-07 RX ADMIN — PREGABALIN 100 MICROGRAM(S): 225 CAPSULE ORAL at 09:01

## 2017-05-07 RX ADMIN — RISPERIDONE 4 MILLIGRAM(S): 4 TABLET ORAL at 20:47

## 2017-05-07 RX ADMIN — Medication 10 MILLIGRAM(S): at 16:58

## 2017-05-07 RX ADMIN — Medication 975 MILLIGRAM(S): at 17:06

## 2017-05-07 RX ADMIN — Medication 975 MILLIGRAM(S): at 09:47

## 2017-05-07 RX ADMIN — METFORMIN HYDROCHLORIDE 1000 MILLIGRAM(S): 850 TABLET ORAL at 09:00

## 2017-05-07 RX ADMIN — LOSARTAN POTASSIUM 100 MILLIGRAM(S): 100 TABLET, FILM COATED ORAL at 09:00

## 2017-05-07 RX ADMIN — METFORMIN HYDROCHLORIDE 1000 MILLIGRAM(S): 850 TABLET ORAL at 16:58

## 2017-05-07 RX ADMIN — CARVEDILOL PHOSPHATE 12.5 MILLIGRAM(S): 80 CAPSULE, EXTENDED RELEASE ORAL at 08:59

## 2017-05-08 LAB
BASOPHILS # BLD AUTO: 0.03 K/UL — SIGNIFICANT CHANGE UP (ref 0–0.2)
BASOPHILS NFR BLD AUTO: 0.3 % — SIGNIFICANT CHANGE UP (ref 0–2)
BUN SERPL-MCNC: 24 MG/DL — HIGH (ref 7–23)
CALCIUM SERPL-MCNC: 9.4 MG/DL — SIGNIFICANT CHANGE UP (ref 8.4–10.5)
CHLORIDE SERPL-SCNC: 99 MMOL/L — SIGNIFICANT CHANGE UP (ref 98–107)
CO2 SERPL-SCNC: 40 MMOL/L — HIGH (ref 22–31)
CREAT SERPL-MCNC: 0.65 MG/DL — SIGNIFICANT CHANGE UP (ref 0.5–1.3)
EOSINOPHIL # BLD AUTO: 0.5 K/UL — SIGNIFICANT CHANGE UP (ref 0–0.5)
EOSINOPHIL NFR BLD AUTO: 5.8 % — SIGNIFICANT CHANGE UP (ref 0–6)
GLUCOSE SERPL-MCNC: 168 MG/DL — HIGH (ref 70–99)
HCT VFR BLD CALC: 31.9 % — LOW (ref 34.5–45)
HGB BLD-MCNC: 10.2 G/DL — LOW (ref 11.5–15.5)
IMM GRANULOCYTES NFR BLD AUTO: 0.2 % — SIGNIFICANT CHANGE UP (ref 0–1.5)
LYMPHOCYTES # BLD AUTO: 2.53 K/UL — SIGNIFICANT CHANGE UP (ref 1–3.3)
LYMPHOCYTES # BLD AUTO: 29.5 % — SIGNIFICANT CHANGE UP (ref 13–44)
MCHC RBC-ENTMCNC: 27.1 PG — SIGNIFICANT CHANGE UP (ref 27–34)
MCHC RBC-ENTMCNC: 32 % — SIGNIFICANT CHANGE UP (ref 32–36)
MCV RBC AUTO: 84.6 FL — SIGNIFICANT CHANGE UP (ref 80–100)
MONOCYTES # BLD AUTO: 0.88 K/UL — SIGNIFICANT CHANGE UP (ref 0–0.9)
MONOCYTES NFR BLD AUTO: 10.2 % — SIGNIFICANT CHANGE UP (ref 2–14)
NEUTROPHILS # BLD AUTO: 4.63 K/UL — SIGNIFICANT CHANGE UP (ref 1.8–7.4)
NEUTROPHILS NFR BLD AUTO: 54 % — SIGNIFICANT CHANGE UP (ref 43–77)
PLATELET # BLD AUTO: 317 K/UL — SIGNIFICANT CHANGE UP (ref 150–400)
PMV BLD: 9.8 FL — SIGNIFICANT CHANGE UP (ref 7–13)
POTASSIUM SERPL-MCNC: 4.4 MMOL/L — SIGNIFICANT CHANGE UP (ref 3.5–5.3)
POTASSIUM SERPL-SCNC: 4.4 MMOL/L — SIGNIFICANT CHANGE UP (ref 3.5–5.3)
RBC # BLD: 3.77 M/UL — LOW (ref 3.8–5.2)
RBC # FLD: 12.9 % — SIGNIFICANT CHANGE UP (ref 10.3–14.5)
SODIUM SERPL-SCNC: 140 MMOL/L — SIGNIFICANT CHANGE UP (ref 135–145)
VALPROATE SERPL-MCNC: 56.4 UG/ML — SIGNIFICANT CHANGE UP (ref 50–100)
WBC # BLD: 8.59 K/UL — SIGNIFICANT CHANGE UP (ref 3.8–10.5)
WBC # FLD AUTO: 8.59 K/UL — SIGNIFICANT CHANGE UP (ref 3.8–10.5)

## 2017-05-08 PROCEDURE — 99232 SBSQ HOSP IP/OBS MODERATE 35: CPT

## 2017-05-08 RX ADMIN — PREGABALIN 100 MICROGRAM(S): 225 CAPSULE ORAL at 11:00

## 2017-05-08 RX ADMIN — CARVEDILOL PHOSPHATE 12.5 MILLIGRAM(S): 80 CAPSULE, EXTENDED RELEASE ORAL at 21:41

## 2017-05-08 RX ADMIN — CARVEDILOL PHOSPHATE 12.5 MILLIGRAM(S): 80 CAPSULE, EXTENDED RELEASE ORAL at 09:22

## 2017-05-08 RX ADMIN — Medication 10 MILLIGRAM(S): at 16:46

## 2017-05-08 RX ADMIN — METFORMIN HYDROCHLORIDE 1000 MILLIGRAM(S): 850 TABLET ORAL at 09:22

## 2017-05-08 RX ADMIN — Medication 975 MILLIGRAM(S): at 05:12

## 2017-05-08 RX ADMIN — Medication 975 MILLIGRAM(S): at 06:11

## 2017-05-08 RX ADMIN — LOSARTAN POTASSIUM 100 MILLIGRAM(S): 100 TABLET, FILM COATED ORAL at 09:22

## 2017-05-08 RX ADMIN — Medication 1 APPLICATION(S): at 11:00

## 2017-05-08 RX ADMIN — Medication 975 MILLIGRAM(S): at 17:44

## 2017-05-08 RX ADMIN — DIVALPROEX SODIUM 1000 MILLIGRAM(S): 500 TABLET, DELAYED RELEASE ORAL at 21:41

## 2017-05-08 RX ADMIN — METFORMIN HYDROCHLORIDE 1000 MILLIGRAM(S): 850 TABLET ORAL at 16:46

## 2017-05-08 RX ADMIN — RISPERIDONE 4 MILLIGRAM(S): 4 TABLET ORAL at 21:41

## 2017-05-08 RX ADMIN — Medication 81 MILLIGRAM(S): at 09:15

## 2017-05-08 RX ADMIN — Medication 975 MILLIGRAM(S): at 16:35

## 2017-05-08 RX ADMIN — Medication 10 MILLIGRAM(S): at 09:15

## 2017-05-09 ENCOUNTER — OUTPATIENT (OUTPATIENT)
Dept: OUTPATIENT SERVICES | Facility: HOSPITAL | Age: 67
LOS: 1 days | End: 2017-05-09
Payer: COMMERCIAL

## 2017-05-09 ENCOUNTER — APPOINTMENT (OUTPATIENT)
Dept: NUCLEAR MEDICINE | Facility: IMAGING CENTER | Age: 67
End: 2017-05-09

## 2017-05-09 DIAGNOSIS — Z00.8 ENCOUNTER FOR OTHER GENERAL EXAMINATION: ICD-10-CM

## 2017-05-09 LAB
BUN SERPL-MCNC: 31 MG/DL — HIGH (ref 7–23)
CALCIUM SERPL-MCNC: 9.9 MG/DL — SIGNIFICANT CHANGE UP (ref 8.4–10.5)
CHLORIDE SERPL-SCNC: 100 MMOL/L — SIGNIFICANT CHANGE UP (ref 98–107)
CO2 SERPL-SCNC: 26 MMOL/L — SIGNIFICANT CHANGE UP (ref 22–31)
CREAT SERPL-MCNC: 0.71 MG/DL — SIGNIFICANT CHANGE UP (ref 0.5–1.3)
GLUCOSE SERPL-MCNC: 144 MG/DL — HIGH (ref 70–99)
POTASSIUM SERPL-MCNC: 4.2 MMOL/L — SIGNIFICANT CHANGE UP (ref 3.5–5.3)
POTASSIUM SERPL-SCNC: 4.2 MMOL/L — SIGNIFICANT CHANGE UP (ref 3.5–5.3)
SODIUM SERPL-SCNC: 140 MMOL/L — SIGNIFICANT CHANGE UP (ref 135–145)

## 2017-05-09 PROCEDURE — A9552: CPT

## 2017-05-09 PROCEDURE — 99232 SBSQ HOSP IP/OBS MODERATE 35: CPT

## 2017-05-09 RX ADMIN — Medication 10 MILLIGRAM(S): at 16:58

## 2017-05-09 RX ADMIN — DIVALPROEX SODIUM 1000 MILLIGRAM(S): 500 TABLET, DELAYED RELEASE ORAL at 22:35

## 2017-05-09 RX ADMIN — Medication 81 MILLIGRAM(S): at 09:25

## 2017-05-09 RX ADMIN — Medication 1 APPLICATION(S): at 09:25

## 2017-05-09 RX ADMIN — Medication 975 MILLIGRAM(S): at 17:34

## 2017-05-09 RX ADMIN — METFORMIN HYDROCHLORIDE 1000 MILLIGRAM(S): 850 TABLET ORAL at 16:58

## 2017-05-09 RX ADMIN — RISPERIDONE 4 MILLIGRAM(S): 4 TABLET ORAL at 22:35

## 2017-05-09 RX ADMIN — PREGABALIN 100 MICROGRAM(S): 225 CAPSULE ORAL at 09:25

## 2017-05-09 RX ADMIN — CARVEDILOL PHOSPHATE 12.5 MILLIGRAM(S): 80 CAPSULE, EXTENDED RELEASE ORAL at 09:25

## 2017-05-09 RX ADMIN — POLYETHYLENE GLYCOL 3350 17 GRAM(S): 17 POWDER, FOR SOLUTION ORAL at 09:25

## 2017-05-09 RX ADMIN — CARVEDILOL PHOSPHATE 12.5 MILLIGRAM(S): 80 CAPSULE, EXTENDED RELEASE ORAL at 22:35

## 2017-05-09 RX ADMIN — LOSARTAN POTASSIUM 100 MILLIGRAM(S): 100 TABLET, FILM COATED ORAL at 09:25

## 2017-05-09 RX ADMIN — SIMETHICONE 160 MILLIGRAM(S): 80 TABLET, CHEWABLE ORAL at 22:35

## 2017-05-10 VITALS
SYSTOLIC BLOOD PRESSURE: 153 MMHG | RESPIRATION RATE: 18 BRPM | DIASTOLIC BLOOD PRESSURE: 72 MMHG | TEMPERATURE: 98 F | HEART RATE: 80 BPM

## 2017-05-10 PROCEDURE — 90853 GROUP PSYCHOTHERAPY: CPT

## 2017-05-10 PROCEDURE — 99232 SBSQ HOSP IP/OBS MODERATE 35: CPT | Mod: 25

## 2017-05-10 RX ORDER — CARVEDILOL PHOSPHATE 80 MG/1
1 CAPSULE, EXTENDED RELEASE ORAL
Qty: 28 | Refills: 0
Start: 2017-05-10 | End: 2017-05-24

## 2017-05-10 RX ORDER — PREGABALIN 225 MG/1
1 CAPSULE ORAL
Qty: 0 | Refills: 0 | DISCHARGE
Start: 2017-05-10

## 2017-05-10 RX ORDER — HYDROCHLOROTHIAZIDE 25 MG
1 TABLET ORAL
Qty: 14 | Refills: 0
Start: 2017-05-10 | End: 2017-05-24

## 2017-05-10 RX ORDER — LOSARTAN POTASSIUM 100 MG/1
1 TABLET, FILM COATED ORAL
Qty: 14 | Refills: 0
Start: 2017-05-10 | End: 2017-05-24

## 2017-05-10 RX ORDER — DIVALPROEX SODIUM 500 MG/1
2 TABLET, DELAYED RELEASE ORAL
Qty: 28 | Refills: 0
Start: 2017-05-10 | End: 2017-05-24

## 2017-05-10 RX ORDER — RISPERIDONE 4 MG/1
1 TABLET ORAL
Qty: 14 | Refills: 0
Start: 2017-05-10 | End: 2017-05-24

## 2017-05-10 RX ORDER — METFORMIN HYDROCHLORIDE 850 MG/1
1 TABLET ORAL
Qty: 28 | Refills: 0
Start: 2017-05-10 | End: 2017-05-24

## 2017-05-10 RX ORDER — POLYETHYLENE GLYCOL 3350 17 G/17G
17 POWDER, FOR SOLUTION ORAL
Qty: 238 | Refills: 0
Start: 2017-05-10 | End: 2017-05-24

## 2017-05-10 RX ORDER — SIMETHICONE 80 MG/1
2 TABLET, CHEWABLE ORAL
Qty: 56 | Refills: 0
Start: 2017-05-10 | End: 2017-05-24

## 2017-05-10 RX ADMIN — METFORMIN HYDROCHLORIDE 1000 MILLIGRAM(S): 850 TABLET ORAL at 08:34

## 2017-05-10 RX ADMIN — Medication 10 MILLIGRAM(S): at 08:34

## 2017-05-10 RX ADMIN — DIVALPROEX SODIUM 1000 MILLIGRAM(S): 500 TABLET, DELAYED RELEASE ORAL at 21:18

## 2017-05-10 RX ADMIN — METFORMIN HYDROCHLORIDE 1000 MILLIGRAM(S): 850 TABLET ORAL at 17:19

## 2017-05-10 RX ADMIN — CARVEDILOL PHOSPHATE 12.5 MILLIGRAM(S): 80 CAPSULE, EXTENDED RELEASE ORAL at 08:30

## 2017-05-10 RX ADMIN — Medication 81 MILLIGRAM(S): at 08:34

## 2017-05-10 RX ADMIN — LOSARTAN POTASSIUM 100 MILLIGRAM(S): 100 TABLET, FILM COATED ORAL at 08:34

## 2017-05-10 RX ADMIN — Medication 975 MILLIGRAM(S): at 16:18

## 2017-05-10 RX ADMIN — CARVEDILOL PHOSPHATE 12.5 MILLIGRAM(S): 80 CAPSULE, EXTENDED RELEASE ORAL at 21:18

## 2017-05-10 RX ADMIN — RISPERIDONE 4 MILLIGRAM(S): 4 TABLET ORAL at 21:18

## 2017-05-10 RX ADMIN — Medication 1 APPLICATION(S): at 08:36

## 2017-05-10 RX ADMIN — Medication 975 MILLIGRAM(S): at 15:48

## 2017-05-10 RX ADMIN — SIMETHICONE 160 MILLIGRAM(S): 80 TABLET, CHEWABLE ORAL at 08:37

## 2017-05-10 RX ADMIN — Medication 10 MILLIGRAM(S): at 17:19

## 2017-05-10 RX ADMIN — SIMETHICONE 160 MILLIGRAM(S): 80 TABLET, CHEWABLE ORAL at 21:18

## 2017-05-10 RX ADMIN — PREGABALIN 100 MICROGRAM(S): 225 CAPSULE ORAL at 08:34

## 2017-05-11 PROCEDURE — 99239 HOSP IP/OBS DSCHRG MGMT >30: CPT

## 2017-05-11 RX ADMIN — LOSARTAN POTASSIUM 100 MILLIGRAM(S): 100 TABLET, FILM COATED ORAL at 08:59

## 2017-05-11 RX ADMIN — PREGABALIN 100 MICROGRAM(S): 225 CAPSULE ORAL at 08:44

## 2017-05-11 RX ADMIN — Medication 1 APPLICATION(S): at 08:42

## 2017-05-11 RX ADMIN — Medication 10 MILLIGRAM(S): at 08:42

## 2017-05-11 RX ADMIN — Medication 81 MILLIGRAM(S): at 08:41

## 2017-05-11 RX ADMIN — CARVEDILOL PHOSPHATE 12.5 MILLIGRAM(S): 80 CAPSULE, EXTENDED RELEASE ORAL at 08:59

## 2017-05-11 RX ADMIN — METFORMIN HYDROCHLORIDE 1000 MILLIGRAM(S): 850 TABLET ORAL at 08:42

## 2017-06-02 ENCOUNTER — APPOINTMENT (OUTPATIENT)
Dept: DERMATOLOGY | Facility: CLINIC | Age: 67
End: 2017-06-02

## 2017-12-15 PROCEDURE — 80061 LIPID PANEL: CPT

## 2017-12-15 PROCEDURE — 93970 EXTREMITY STUDY: CPT

## 2017-12-15 PROCEDURE — 80053 COMPREHEN METABOLIC PANEL: CPT

## 2017-12-15 PROCEDURE — 85652 RBC SED RATE AUTOMATED: CPT

## 2017-12-15 PROCEDURE — 93005 ELECTROCARDIOGRAM TRACING: CPT

## 2017-12-15 PROCEDURE — 93306 TTE W/DOPPLER COMPLETE: CPT

## 2017-12-15 PROCEDURE — 99285 EMERGENCY DEPT VISIT HI MDM: CPT | Mod: 25

## 2017-12-15 PROCEDURE — 83036 HEMOGLOBIN GLYCOSYLATED A1C: CPT

## 2017-12-15 PROCEDURE — 86225 DNA ANTIBODY NATIVE: CPT

## 2017-12-15 PROCEDURE — 85610 PROTHROMBIN TIME: CPT

## 2017-12-15 PROCEDURE — 71045 X-RAY EXAM CHEST 1 VIEW: CPT

## 2017-12-15 PROCEDURE — 96374 THER/PROPH/DIAG INJ IV PUSH: CPT

## 2017-12-15 PROCEDURE — 85730 THROMBOPLASTIN TIME PARTIAL: CPT

## 2017-12-15 PROCEDURE — 86036 ANCA SCREEN EACH ANTIBODY: CPT

## 2017-12-15 PROCEDURE — 36415 COLL VENOUS BLD VENIPUNCTURE: CPT

## 2017-12-15 PROCEDURE — 82306 VITAMIN D 25 HYDROXY: CPT

## 2017-12-15 PROCEDURE — 85379 FIBRIN DEGRADATION QUANT: CPT

## 2017-12-15 PROCEDURE — 83735 ASSAY OF MAGNESIUM: CPT

## 2017-12-15 PROCEDURE — 84484 ASSAY OF TROPONIN QUANT: CPT

## 2017-12-15 PROCEDURE — 82746 ASSAY OF FOLIC ACID SERUM: CPT

## 2017-12-15 PROCEDURE — G0378: CPT

## 2017-12-15 PROCEDURE — 86431 RHEUMATOID FACTOR QUANT: CPT

## 2017-12-15 PROCEDURE — 86200 CCP ANTIBODY: CPT

## 2017-12-15 PROCEDURE — 82553 CREATINE MB FRACTION: CPT

## 2017-12-15 PROCEDURE — 82607 VITAMIN B-12: CPT

## 2017-12-15 PROCEDURE — 71275 CT ANGIOGRAPHY CHEST: CPT

## 2017-12-15 PROCEDURE — 86038 ANTINUCLEAR ANTIBODIES: CPT

## 2017-12-15 PROCEDURE — 83690 ASSAY OF LIPASE: CPT

## 2017-12-15 PROCEDURE — 83880 ASSAY OF NATRIURETIC PEPTIDE: CPT

## 2017-12-15 PROCEDURE — 82550 ASSAY OF CK (CPK): CPT

## 2017-12-15 PROCEDURE — 86140 C-REACTIVE PROTEIN: CPT

## 2017-12-15 PROCEDURE — 85027 COMPLETE CBC AUTOMATED: CPT

## 2018-06-05 NOTE — PATIENT PROFILE ADULT. - LANGUAGE ASSISTANCE NEEDED
Chart reviewed, IE completed. Pt POD #4 s/p lumbar wound I&D 2/2 purulent drainage (+) MRSA. Pt s/p L4-S1 lami, medical facetectomy, fusion 5/16/18/ Pt rcvd OOB to chair, +IVF disconnected, +GAYE drain x1 intact. Pt reports 5/10 "pulling" discomfort in back. Pt tolerated session very well, indpt will all func mob, amb and stairs.
No-Patient/Caregiver offered and refused free interpretation services.

## 2018-06-09 NOTE — ED ADULT NURSE NOTE - HARM RISK FACTORS
Impression:  Episode of LOC of unclear etiology, in patient with renal cell cancinoma, should consider stroke, seizure, especially as he noted right sided weakness and word finding difficulties.  Syncope should be also considered.  Patient has defibrillator on.     Recommendations:  1.         UA/UCx, Chest Xray, Utox and alcohol level  2.         pls check CTH with and w/o contrast, need to pretreat as per patient  3. Seizure and fall precautions  4. No AED at this time, will reconsider if repeat CTH abnormal.  5.        The patient should not drive until event free x 1 year after by [place] law.  The patient is advised to avoid swimming and any activities that may put their life at danger shall they have a seizure.    6.        DVT PPx    Thank you for the courtesy of this consult. no

## 2018-07-26 PROBLEM — Z80.8 FAMILY HISTORY OF SKIN CANCER: Status: INACTIVE | Noted: 2017-01-31

## 2019-01-22 NOTE — ED PROVIDER NOTE - PEDAL EDEMA BILATERAL
No
How Severe Are Your Spot(S)?: mild
What Is The Reason For Today's Visit?: Full Body Skin Examination
What Is The Reason For Today's Visit? (Being Monitored For X): concerning skin lesions on an annual basis
NON-PITTING

## 2019-08-14 NOTE — DISCHARGE NOTE ADULT - NS MD DC FALL RISK RISK
For information on Fall & Injury Prevention, visit [email or web address]
10 feet/x2

## 2019-09-23 NOTE — ED ADULT NURSE NOTE - EENT WDL
right femoral neck fracture
Eyes with no visual disturbances.  Ears clean and dry and no hearing difficulties. Nose with pink mucosa and no drainage.  Mouth mucous membranes moist and pink.  No tenderness or swelling to throat or neck.

## 2019-12-16 ENCOUNTER — EMERGENCY (EMERGENCY)
Facility: HOSPITAL | Age: 69
LOS: 1 days | Discharge: HOME | End: 2019-12-16
Attending: EMERGENCY MEDICINE | Admitting: EMERGENCY MEDICINE
Payer: MEDICARE

## 2019-12-16 VITALS
TEMPERATURE: 98 F | SYSTOLIC BLOOD PRESSURE: 174 MMHG | RESPIRATION RATE: 17 BRPM | HEIGHT: 67 IN | OXYGEN SATURATION: 98 % | HEART RATE: 98 BPM | WEIGHT: 250 LBS | DIASTOLIC BLOOD PRESSURE: 81 MMHG

## 2019-12-16 VITALS
HEART RATE: 81 BPM | RESPIRATION RATE: 47 BRPM | OXYGEN SATURATION: 99 % | TEMPERATURE: 98 F | DIASTOLIC BLOOD PRESSURE: 97 MMHG | SYSTOLIC BLOOD PRESSURE: 182 MMHG

## 2019-12-16 LAB
ANION GAP SERPL CALC-SCNC: 6 MMOL/L — SIGNIFICANT CHANGE UP (ref 5–17)
APTT BLD: 33.9 SEC — SIGNIFICANT CHANGE UP (ref 27.5–36.3)
BASOPHILS # BLD AUTO: 0.04 K/UL — SIGNIFICANT CHANGE UP (ref 0–0.2)
BASOPHILS NFR BLD AUTO: 0.4 % — SIGNIFICANT CHANGE UP (ref 0–2)
BUN SERPL-MCNC: 17 MG/DL — SIGNIFICANT CHANGE UP (ref 7–18)
CALCIUM SERPL-MCNC: 9.4 MG/DL — SIGNIFICANT CHANGE UP (ref 8.4–10.5)
CHLORIDE SERPL-SCNC: 107 MMOL/L — SIGNIFICANT CHANGE UP (ref 96–108)
CO2 SERPL-SCNC: 28 MMOL/L — SIGNIFICANT CHANGE UP (ref 22–31)
CREAT SERPL-MCNC: 0.92 MG/DL — SIGNIFICANT CHANGE UP (ref 0.5–1.3)
EOSINOPHIL # BLD AUTO: 0.28 K/UL — SIGNIFICANT CHANGE UP (ref 0–0.5)
EOSINOPHIL NFR BLD AUTO: 3 % — SIGNIFICANT CHANGE UP (ref 0–6)
GLUCOSE SERPL-MCNC: 162 MG/DL — HIGH (ref 70–99)
HCT VFR BLD CALC: 36.8 % — SIGNIFICANT CHANGE UP (ref 34.5–45)
HGB BLD-MCNC: 11.6 G/DL — SIGNIFICANT CHANGE UP (ref 11.5–15.5)
IMM GRANULOCYTES NFR BLD AUTO: 0.3 % — SIGNIFICANT CHANGE UP (ref 0–1.5)
INR BLD: 1.03 RATIO — SIGNIFICANT CHANGE UP (ref 0.88–1.16)
LYMPHOCYTES # BLD AUTO: 2.13 K/UL — SIGNIFICANT CHANGE UP (ref 1–3.3)
LYMPHOCYTES # BLD AUTO: 22.8 % — SIGNIFICANT CHANGE UP (ref 13–44)
MCHC RBC-ENTMCNC: 27 PG — SIGNIFICANT CHANGE UP (ref 27–34)
MCHC RBC-ENTMCNC: 31.5 GM/DL — LOW (ref 32–36)
MCV RBC AUTO: 85.8 FL — SIGNIFICANT CHANGE UP (ref 80–100)
MONOCYTES # BLD AUTO: 0.87 K/UL — SIGNIFICANT CHANGE UP (ref 0–0.9)
MONOCYTES NFR BLD AUTO: 9.3 % — SIGNIFICANT CHANGE UP (ref 2–14)
NEUTROPHILS # BLD AUTO: 5.99 K/UL — SIGNIFICANT CHANGE UP (ref 1.8–7.4)
NEUTROPHILS NFR BLD AUTO: 64.2 % — SIGNIFICANT CHANGE UP (ref 43–77)
NRBC # BLD: 0 /100 WBCS — SIGNIFICANT CHANGE UP (ref 0–0)
PLATELET # BLD AUTO: 290 K/UL — SIGNIFICANT CHANGE UP (ref 150–400)
POTASSIUM SERPL-MCNC: 4 MMOL/L — SIGNIFICANT CHANGE UP (ref 3.5–5.3)
POTASSIUM SERPL-SCNC: 4 MMOL/L — SIGNIFICANT CHANGE UP (ref 3.5–5.3)
PROTHROM AB SERPL-ACNC: 11.4 SEC — SIGNIFICANT CHANGE UP (ref 10–12.9)
RBC # BLD: 4.29 M/UL — SIGNIFICANT CHANGE UP (ref 3.8–5.2)
RBC # FLD: 13.1 % — SIGNIFICANT CHANGE UP (ref 10.3–14.5)
SODIUM SERPL-SCNC: 141 MMOL/L — SIGNIFICANT CHANGE UP (ref 135–145)
WBC # BLD: 9.34 K/UL — SIGNIFICANT CHANGE UP (ref 3.8–10.5)
WBC # FLD AUTO: 9.34 K/UL — SIGNIFICANT CHANGE UP (ref 3.8–10.5)

## 2019-12-16 PROCEDURE — 85730 THROMBOPLASTIN TIME PARTIAL: CPT

## 2019-12-16 PROCEDURE — 85027 COMPLETE CBC AUTOMATED: CPT

## 2019-12-16 PROCEDURE — 93970 EXTREMITY STUDY: CPT

## 2019-12-16 PROCEDURE — 99284 EMERGENCY DEPT VISIT MOD MDM: CPT

## 2019-12-16 PROCEDURE — 93970 EXTREMITY STUDY: CPT | Mod: 26

## 2019-12-16 PROCEDURE — 80048 BASIC METABOLIC PNL TOTAL CA: CPT

## 2019-12-16 PROCEDURE — 71045 X-RAY EXAM CHEST 1 VIEW: CPT

## 2019-12-16 PROCEDURE — 36415 COLL VENOUS BLD VENIPUNCTURE: CPT

## 2019-12-16 PROCEDURE — 85610 PROTHROMBIN TIME: CPT

## 2019-12-16 PROCEDURE — 71045 X-RAY EXAM CHEST 1 VIEW: CPT | Mod: 26

## 2019-12-16 PROCEDURE — 99284 EMERGENCY DEPT VISIT MOD MDM: CPT | Mod: 25

## 2019-12-16 RX ORDER — ALBUTEROL 90 UG/1
2.5 AEROSOL, METERED ORAL ONCE
Refills: 0 | Status: DISCONTINUED | OUTPATIENT
Start: 2019-12-16 | End: 2019-12-16

## 2019-12-16 NOTE — ED PROVIDER NOTE - OBJECTIVE STATEMENT
69 year old female reports that she has been having swelling bilaterally for two months with the right leg being worse. Patient notes that she had the same symptoms in 2017 and saw a vein specialist that gave treatment which made it go away for two years. Patient has a history of asthma and endorses that for the past 3 weeks she has been more short or breath than baseline. Patient denies chest pain, abd pain, nausea, vomiting, diarrhea, or any other acute complaints. 69 year old female reports that she has been having swelling bilaterally for two months with the right leg being worse. Patient notes that she had the same symptoms in 2017 and saw a vein specialist that gave treatment which made it go away for two years. Reports that for the past 3 weeks, she has been feeling short of breath. Two weeks ago, PMD diagnosed her with asthma and gave her a nebulizer. Since then, she feels much better. No sob now. Patient denies chest pain, abd pain, nausea, vomiting, diarrhea, or any other acute complaints.

## 2019-12-16 NOTE — ED PROVIDER NOTE - CLINICAL SUMMARY MEDICAL DECISION MAKING FREE TEXT BOX
Patient with bilateral leg swelling and no evidence of cellulitis. Check labs and US for possible DVT.

## 2019-12-16 NOTE — ED PROVIDER NOTE - PATIENT PORTAL LINK FT
You can access the FollowMyHealth Patient Portal offered by [facility] by registering at the following website: [email or web address]. By joining [facility]’s FollowMyHealth portal, you will also be able to view your health information using other applications (apps) compatible with our system.

## 2019-12-16 NOTE — ED PROVIDER NOTE - PROGRESS NOTE DETAILS
Called by patient's PMD Dr. Johnson. Pt called office today and c/o swelling in both legs, right worse than left. DVT vs cellulitis. Patient is resting comfortably, NAD. Patient remains comfortable. Initially discussed giving patient a nebulizer treatment now, and she agreed, but then said she only wants a CXR. Asymptomatic now. Discussed with patient hypertension. Patient asymptomatic. Will f/u with PMD tomorrow to get pressure rechecked.

## 2019-12-16 NOTE — ED ADULT NURSE NOTE - NSIMPLEMENTINTERV_GEN_ALL_ED
Implemented All Universal Safety Interventions:  [place] to call system. Call bell, personal items and telephone within reach. Instruct patient to call for assistance. Room bathroom lighting operational. Non-slip footwear when patient is off stretcher. Physically safe environment: no spills, clutter or unnecessary equipment. Stretcher in lowest position, wheels locked, appropriate side rails in place.

## 2019-12-17 PROBLEM — E11.9 TYPE 2 DIABETES MELLITUS WITHOUT COMPLICATIONS: Chronic | Status: ACTIVE | Noted: 2017-01-28

## 2019-12-17 PROBLEM — G62.9 POLYNEUROPATHY, UNSPECIFIED: Chronic | Status: ACTIVE | Noted: 2017-04-11

## 2019-12-17 PROBLEM — I10 ESSENTIAL (PRIMARY) HYPERTENSION: Chronic | Status: ACTIVE | Noted: 2017-01-28

## 2021-01-12 NOTE — ED PROVIDER NOTE - NS ED MD DISPO DISCHARGE CCDA
Assessment:  1  Arthritis of left hip  FL injection left hip (non arthrogram)   2  Left hip pain  Ambulatory referral to Orthopedic Surgery    FL injection left hip (non arthrogram)       Plan:  Patient was educated on maintaining a healthy lifestyle with proper weight management and physician recommended home exercise program/routine for regular fitness  The idea of total hip arthroplasty is discussed with the patient as a definitive treatment option if conservative care does not provide benefit  The patient should schedule left IA hip steroid injection under imaging  She should follow up in 6 weeks  To do next visit:  Return in about 6 weeks (around 2/23/2021)  The above stated was discussed in layman's terms and the patient expressed understanding  All questions were answered to the patient's satisfaction  Scribe Attestation    I,:  Sergio Severin am acting as a scribe while in the presence of the attending physician :       I,:  Trupti Figueroa MD personally performed the services described in this documentation    as scribed in my presence :             Subjective:   Gabrielle Sexton is a 72 y o  female who presents for initial evaluation of left hip  She has had symptoms for 6+ months with no injury  Today she complains of left anterior groin pain  Transitional positions, extending hip and lying on right aggravates while change of position alleviates  She rates her pain 2/10 and 8/10 at its worse  She has tried physical therapy with limited benefit  She has used Tylenol and IBU with some benefit  She denies past hip or lumbar injections or surgery  She admits to 1 episode of left leg tingling sensation, now resolved          Review of systems negative unless otherwise specified in HPI    Past Medical History:   Diagnosis Date    Breast cancer ([facility] [address] ) 03/01/1993    Hematuria     LAST ASSESSED [id number]       Past Surgical History:   Procedure Laterality Date    BREAST LUMPECTOMY Right 04/01/1993    LYMPH NODE BIOPSY      MOHS SURGERY         Family History   Problem Relation Age of Onset    Breast cancer Mother 79    No Known Problems Sister     No Known Problems Daughter     No Known Problems Sister     No Known Problems Sister     No Known Problems Sister     No Known Problems Daughter        Social History     Occupational History    Not on file   Tobacco Use    Smoking status: Never Smoker    Smokeless tobacco: Never Used   Substance and Sexual Activity    Alcohol use: Yes     Frequency: 4 or more times a week     Drinks per session: 1 or 2     Binge frequency: Never    Drug use: No    Sexual activity: Not Currently         Current Outpatient Medications:     Calcium 500 MG tablet, Take by mouth, Disp: , Rfl:     Multiple Vitamin (MULTI-VITAMIN DAILY) TABS, Take by mouth, Disp: , Rfl:     rosuvastatin (CRESTOR) 10 MG tablet, Take 1 tablet (10 mg total) by mouth daily, Disp: 30 tablet, Rfl: 5    Progesterone Micronized 10 % CREA, Place on the skin daily , Disp: , Rfl:     No Known Allergies         Vitals:    01/12/21 1446   BP: (!) 178/83   Pulse: 80       Objective:  Physical exam  · General: Awake, Alert, Oriented  · Eyes: Pupils equal, round and reactive to light  · Heart: regular rate and rhythm  · Lungs: No audible wheezing  · Abdomen: soft                    Ortho Exam   Left hip:  Apprehension with ROM  Groin pain with IR  ER with flexion  Calf compartments soft and supple  Sensation intact  Toes are warm sensate and mobile      Right hip:  Good arc of motion    Diagnostics, reviewed and taken today if performed as documented: The attending physician has personally reviewed the pertinent films in PACS and interpretation is as follows:  Left hip x-ray: Moderate arthritic changes with decreased joint space and subchondral sclerosis        Procedures, if performed today:    Procedures    None performed      Portions of the record may have been created with voice recognition software  Occasional wrong word or "sound a like" substitutions may have occurred due to the inherent limitations of voice recognition software  Read the chart carefully and recognize, using context, where substitutions have occurred  Patient/Caregiver provided printed discharge information.

## 2021-08-09 NOTE — PATIENT PROFILE ADULT. - NS TRANSFER DISPOSITION PATIENT BELONGINGS
with patient Medical Necessity Statement: Based on my medical judgement, Mohs surgery is the most appropriate treatment for this cancer compared to other treatments.

## 2023-02-07 ENCOUNTER — NON-APPOINTMENT (OUTPATIENT)
Age: 73
End: 2023-02-07

## 2023-04-27 ENCOUNTER — APPOINTMENT (OUTPATIENT)
Dept: GASTROENTEROLOGY | Facility: CLINIC | Age: 73
End: 2023-04-27

## 2023-06-05 NOTE — ED PROVIDER NOTE - NS ED MD DISPO ADMITTING SERVICE
FMED Clindamycin Pregnancy And Lactation Text: This medication can be used in pregnancy if certain situations. Clindamycin is also present in breast milk.

## 2023-09-02 NOTE — ED PROVIDER NOTE - CPE EDP CARDIAC NORM
BREASTFEEDING SERVICES NOTE:    Time spent with mother/baby: 34 minutes for follow up/ pump teaching     The patient requested to pump and bottle feed breast milk and provide formula. No longer desires to breastfeed.  A hospital grade breast pump was brought to the room. Pump teaching was done. The pt applied lanolin prior to pumping. Fit with a 21 mm flange for both breasts. Added colostrum cups to the breast pump. Observed pumping for 5 minutes. The pt tolerated pumping well. Measurable amounts of colostrum seen on the right breast. Reassured her she may see different volumes between her breasts.   Gave [facility] handout, \"How to Build a Full Milk Supply with a Breast Pump.\"   Reviewed breast milk storage recommendations.   Gave [facility] handout, \"How to Keep Your Breast Pump Kit Clean.\"  Provided syringes for collection of breast milk for small volumes. The patient was aware that if the volumes are increasing she can also feed with the bottles.   She will continue to also offer formula bottles. The  is at a 4.19% weight loss today with adequate output.     Lactation will continue to follow.         normal...

## 2023-09-24 ENCOUNTER — INPATIENT (INPATIENT)
Facility: HOSPITAL | Age: 73
LOS: 4 days | Discharge: NURSING, REHAB OR LONG-TERM CARE | DRG: 312 | End: 2023-09-29
Attending: STUDENT IN AN ORGANIZED HEALTH CARE EDUCATION/TRAINING PROGRAM | Admitting: HOSPITALIST
Payer: MEDICARE

## 2023-09-24 VITALS
SYSTOLIC BLOOD PRESSURE: 109 MMHG | HEIGHT: 62 IN | HEART RATE: 64 BPM | OXYGEN SATURATION: 100 % | TEMPERATURE: 99 F | DIASTOLIC BLOOD PRESSURE: 62 MMHG | RESPIRATION RATE: 20 BRPM | WEIGHT: 250 LBS

## 2023-09-24 PROCEDURE — 99291 CRITICAL CARE FIRST HOUR: CPT | Mod: GC

## 2023-09-25 DIAGNOSIS — D72.829 ELEVATED WHITE BLOOD CELL COUNT, UNSPECIFIED: ICD-10-CM

## 2023-09-25 DIAGNOSIS — K83.1 OBSTRUCTION OF BILE DUCT: ICD-10-CM

## 2023-09-25 DIAGNOSIS — R56.9 UNSPECIFIED CONVULSIONS: ICD-10-CM

## 2023-09-25 DIAGNOSIS — E11.9 TYPE 2 DIABETES MELLITUS WITHOUT COMPLICATIONS: ICD-10-CM

## 2023-09-25 DIAGNOSIS — I10 ESSENTIAL (PRIMARY) HYPERTENSION: ICD-10-CM

## 2023-09-25 DIAGNOSIS — Z29.9 ENCOUNTER FOR PROPHYLACTIC MEASURES, UNSPECIFIED: ICD-10-CM

## 2023-09-25 DIAGNOSIS — E78.5 HYPERLIPIDEMIA, UNSPECIFIED: ICD-10-CM

## 2023-09-25 DIAGNOSIS — C17.0 MALIGNANT NEOPLASM OF DUODENUM: ICD-10-CM

## 2023-09-25 LAB
ALBUMIN SERPL ELPH-MCNC: 3.2 G/DL — LOW (ref 3.3–5)
ALP SERPL-CCNC: 450 U/L — HIGH (ref 40–120)
ALT FLD-CCNC: 46 U/L — HIGH (ref 10–45)
ANION GAP SERPL CALC-SCNC: 16 MMOL/L — SIGNIFICANT CHANGE UP (ref 5–17)
APTT BLD: 26.8 SEC — SIGNIFICANT CHANGE UP (ref 24.5–35.6)
AST SERPL-CCNC: 49 U/L — HIGH (ref 10–40)
BASE EXCESS BLDV CALC-SCNC: -1.7 MMOL/L — SIGNIFICANT CHANGE UP (ref -2–3)
BASE EXCESS BLDV CALC-SCNC: 1.7 MMOL/L — SIGNIFICANT CHANGE UP (ref -2–3)
BASOPHILS # BLD AUTO: 0.04 K/UL — SIGNIFICANT CHANGE UP (ref 0–0.2)
BASOPHILS NFR BLD AUTO: 0.3 % — SIGNIFICANT CHANGE UP (ref 0–2)
BILIRUB SERPL-MCNC: 1.3 MG/DL — HIGH (ref 0.2–1.2)
BLD GP AB SCN SERPL QL: NEGATIVE — SIGNIFICANT CHANGE UP
BUN SERPL-MCNC: 34 MG/DL — HIGH (ref 7–23)
CA-I SERPL-SCNC: 1.2 MMOL/L — SIGNIFICANT CHANGE UP (ref 1.15–1.33)
CALCIUM SERPL-MCNC: 9.4 MG/DL — SIGNIFICANT CHANGE UP (ref 8.4–10.5)
CHLORIDE BLDV-SCNC: 97 MMOL/L — SIGNIFICANT CHANGE UP (ref 96–108)
CHLORIDE SERPL-SCNC: 95 MMOL/L — LOW (ref 96–108)
CK SERPL-CCNC: 18 U/L — LOW (ref 25–170)
CO2 BLDV-SCNC: 26 MMOL/L — SIGNIFICANT CHANGE UP (ref 22–26)
CO2 BLDV-SCNC: 30 MMOL/L — HIGH (ref 22–26)
CO2 SERPL-SCNC: 21 MMOL/L — LOW (ref 22–31)
CREAT SERPL-MCNC: 1.64 MG/DL — HIGH (ref 0.5–1.3)
EGFR: 33 ML/MIN/1.73M2 — LOW
EOSINOPHIL # BLD AUTO: 0.35 K/UL — SIGNIFICANT CHANGE UP (ref 0–0.5)
EOSINOPHIL NFR BLD AUTO: 3 % — SIGNIFICANT CHANGE UP (ref 0–6)
GAS PNL BLDV: 129 MMOL/L — LOW (ref 136–145)
GAS PNL BLDV: SIGNIFICANT CHANGE UP
GLUCOSE BLDC GLUCOMTR-MCNC: 195 MG/DL — HIGH (ref 70–99)
GLUCOSE BLDC GLUCOMTR-MCNC: 228 MG/DL — HIGH (ref 70–99)
GLUCOSE BLDV-MCNC: 225 MG/DL — HIGH (ref 70–99)
GLUCOSE SERPL-MCNC: 189 MG/DL — HIGH (ref 70–99)
HCO3 BLDV-SCNC: 25 MMOL/L — SIGNIFICANT CHANGE UP (ref 22–29)
HCO3 BLDV-SCNC: 28 MMOL/L — SIGNIFICANT CHANGE UP (ref 22–29)
HCT VFR BLD CALC: 31.3 % — LOW (ref 34.5–45)
HCT VFR BLDA CALC: 34 % — LOW (ref 34.5–46.5)
HGB BLD CALC-MCNC: 11.3 G/DL — LOW (ref 11.7–16.1)
HGB BLD-MCNC: 10.5 G/DL — LOW (ref 11.5–15.5)
HOROWITZ INDEX BLDV+IHG-RTO: SIGNIFICANT CHANGE UP
HOROWITZ INDEX BLDV+IHG-RTO: SIGNIFICANT CHANGE UP
IMM GRANULOCYTES NFR BLD AUTO: 1.3 % — HIGH (ref 0–0.9)
INR BLD: 1.26 RATIO — HIGH (ref 0.85–1.18)
LACTATE BLDV-MCNC: 2.1 MMOL/L — HIGH (ref 0.5–2)
LIDOCAIN IGE QN: 36 U/L — SIGNIFICANT CHANGE UP (ref 7–60)
LYMPHOCYTES # BLD AUTO: 0.73 K/UL — LOW (ref 1–3.3)
LYMPHOCYTES # BLD AUTO: 6.3 % — LOW (ref 13–44)
MCHC RBC-ENTMCNC: 28.9 PG — SIGNIFICANT CHANGE UP (ref 27–34)
MCHC RBC-ENTMCNC: 33.5 GM/DL — SIGNIFICANT CHANGE UP (ref 32–36)
MCV RBC AUTO: 86.2 FL — SIGNIFICANT CHANGE UP (ref 80–100)
MONOCYTES # BLD AUTO: 1.05 K/UL — HIGH (ref 0–0.9)
MONOCYTES NFR BLD AUTO: 9 % — SIGNIFICANT CHANGE UP (ref 2–14)
NEUTROPHILS # BLD AUTO: 9.31 K/UL — HIGH (ref 1.8–7.4)
NEUTROPHILS NFR BLD AUTO: 80.1 % — HIGH (ref 43–77)
NRBC # BLD: 0 /100 WBCS — SIGNIFICANT CHANGE UP (ref 0–0)
PCO2 BLDV: 48 MMHG — HIGH (ref 39–42)
PCO2 BLDV: 51 MMHG — HIGH (ref 39–42)
PH BLDV: 7.32 — SIGNIFICANT CHANGE UP (ref 7.32–7.43)
PH BLDV: 7.35 — SIGNIFICANT CHANGE UP (ref 7.32–7.43)
PLATELET # BLD AUTO: 313 K/UL — SIGNIFICANT CHANGE UP (ref 150–400)
PO2 BLDV: 29 MMHG — SIGNIFICANT CHANGE UP (ref 25–45)
PO2 BLDV: 43 MMHG — SIGNIFICANT CHANGE UP (ref 25–45)
POTASSIUM BLDV-SCNC: 3.6 MMOL/L — SIGNIFICANT CHANGE UP (ref 3.5–5.1)
POTASSIUM SERPL-MCNC: 4.3 MMOL/L — SIGNIFICANT CHANGE UP (ref 3.5–5.3)
POTASSIUM SERPL-SCNC: 4.3 MMOL/L — SIGNIFICANT CHANGE UP (ref 3.5–5.3)
PROCALCITONIN SERPL-MCNC: 0.54 NG/ML — HIGH (ref 0.02–0.1)
PROT SERPL-MCNC: 7.6 G/DL — SIGNIFICANT CHANGE UP (ref 6–8.3)
PROTHROM AB SERPL-ACNC: 13.1 SEC — HIGH (ref 9.5–13)
RAPID RVP RESULT: SIGNIFICANT CHANGE UP
RBC # BLD: 3.63 M/UL — LOW (ref 3.8–5.2)
RBC # FLD: 14.8 % — HIGH (ref 10.3–14.5)
RH IG SCN BLD-IMP: POSITIVE — SIGNIFICANT CHANGE UP
SAO2 % BLDV: 43.6 % — LOW (ref 67–88)
SAO2 % BLDV: 69.2 % — SIGNIFICANT CHANGE UP (ref 67–88)
SARS-COV-2 RNA SPEC QL NAA+PROBE: SIGNIFICANT CHANGE UP
SODIUM SERPL-SCNC: 132 MMOL/L — LOW (ref 135–145)
TROPONIN T, HIGH SENSITIVITY RESULT: 33 NG/L — SIGNIFICANT CHANGE UP (ref 0–51)
TROPONIN T, HIGH SENSITIVITY RESULT: 34 NG/L — SIGNIFICANT CHANGE UP (ref 0–51)
WBC # BLD: 11.63 K/UL — HIGH (ref 3.8–10.5)
WBC # FLD AUTO: 11.63 K/UL — HIGH (ref 3.8–10.5)

## 2023-09-25 PROCEDURE — 74177 CT ABD & PELVIS W/CONTRAST: CPT | Mod: 26,MA

## 2023-09-25 PROCEDURE — 72125 CT NECK SPINE W/O DYE: CPT | Mod: 26,MA

## 2023-09-25 PROCEDURE — 99223 1ST HOSP IP/OBS HIGH 75: CPT | Mod: GC,AI

## 2023-09-25 PROCEDURE — 99233 SBSQ HOSP IP/OBS HIGH 50: CPT | Mod: GC

## 2023-09-25 PROCEDURE — 70450 CT HEAD/BRAIN W/O DYE: CPT | Mod: 26,MA

## 2023-09-25 PROCEDURE — 71260 CT THORAX DX C+: CPT | Mod: 26,MA

## 2023-09-25 RX ORDER — OMEPRAZOLE 10 MG/1
1 CAPSULE, DELAYED RELEASE ORAL
Refills: 0 | DISCHARGE

## 2023-09-25 RX ORDER — LANOLIN ALCOHOL/MO/W.PET/CERES
3 CREAM (GRAM) TOPICAL AT BEDTIME
Refills: 0 | Status: DISCONTINUED | OUTPATIENT
Start: 2023-09-25 | End: 2023-09-29

## 2023-09-25 RX ORDER — ROSUVASTATIN CALCIUM 5 MG/1
1 TABLET ORAL
Refills: 0 | DISCHARGE

## 2023-09-25 RX ORDER — LEVETIRACETAM 250 MG/1
1000 TABLET, FILM COATED ORAL ONCE
Refills: 0 | Status: COMPLETED | OUTPATIENT
Start: 2023-09-25 | End: 2023-09-25

## 2023-09-25 RX ORDER — ONDANSETRON 8 MG/1
4 TABLET, FILM COATED ORAL EVERY 8 HOURS
Refills: 0 | Status: DISCONTINUED | OUTPATIENT
Start: 2023-09-25 | End: 2023-09-29

## 2023-09-25 RX ORDER — INSULIN GLARGINE 100 [IU]/ML
13 INJECTION, SOLUTION SUBCUTANEOUS AT BEDTIME
Refills: 0 | Status: DISCONTINUED | OUTPATIENT
Start: 2023-09-25 | End: 2023-09-29

## 2023-09-25 RX ORDER — PANTOPRAZOLE SODIUM 20 MG/1
40 TABLET, DELAYED RELEASE ORAL
Refills: 0 | Status: DISCONTINUED | OUTPATIENT
Start: 2023-09-25 | End: 2023-09-29

## 2023-09-25 RX ORDER — NITROGLYCERIN 6.5 MG
1 CAPSULE, EXTENDED RELEASE ORAL
Refills: 0 | DISCHARGE

## 2023-09-25 RX ORDER — INSULIN LISPRO 100/ML
VIAL (ML) SUBCUTANEOUS AT BEDTIME
Refills: 0 | Status: DISCONTINUED | OUTPATIENT
Start: 2023-09-25 | End: 2023-09-29

## 2023-09-25 RX ORDER — INSULIN GLARGINE 100 [IU]/ML
15 INJECTION, SOLUTION SUBCUTANEOUS
Refills: 0 | DISCHARGE

## 2023-09-25 RX ORDER — SODIUM CHLORIDE 9 MG/ML
250 INJECTION INTRAMUSCULAR; INTRAVENOUS; SUBCUTANEOUS ONCE
Refills: 0 | Status: COMPLETED | OUTPATIENT
Start: 2023-09-25 | End: 2023-09-25

## 2023-09-25 RX ORDER — GABAPENTIN 400 MG/1
0 CAPSULE ORAL
Refills: 0 | DISCHARGE

## 2023-09-25 RX ORDER — INSULIN LISPRO 100/ML
2 VIAL (ML) SUBCUTANEOUS
Refills: 0 | DISCHARGE

## 2023-09-25 RX ORDER — IPRATROPIUM/ALBUTEROL SULFATE 18-103MCG
3 AEROSOL WITH ADAPTER (GRAM) INHALATION
Refills: 0 | Status: COMPLETED | OUTPATIENT
Start: 2023-09-25 | End: 2023-09-25

## 2023-09-25 RX ORDER — AMLODIPINE BESYLATE 2.5 MG/1
5 TABLET ORAL DAILY
Refills: 0 | Status: DISCONTINUED | OUTPATIENT
Start: 2023-09-25 | End: 2023-09-25

## 2023-09-25 RX ORDER — ATORVASTATIN CALCIUM 80 MG/1
80 TABLET, FILM COATED ORAL AT BEDTIME
Refills: 0 | Status: DISCONTINUED | OUTPATIENT
Start: 2023-09-25 | End: 2023-09-29

## 2023-09-25 RX ORDER — LACTOBACILLUS ACIDOPHILUS 100MM CELL
2 CAPSULE ORAL
Refills: 0 | DISCHARGE

## 2023-09-25 RX ORDER — FERROUS SULFATE 325(65) MG
1 TABLET ORAL
Refills: 0 | DISCHARGE

## 2023-09-25 RX ORDER — CARVEDILOL PHOSPHATE 80 MG/1
1 CAPSULE, EXTENDED RELEASE ORAL
Refills: 0 | DISCHARGE

## 2023-09-25 RX ORDER — GABAPENTIN 400 MG/1
300 CAPSULE ORAL
Refills: 0 | Status: DISCONTINUED | OUTPATIENT
Start: 2023-09-25 | End: 2023-09-29

## 2023-09-25 RX ORDER — INSULIN LISPRO 100/ML
VIAL (ML) SUBCUTANEOUS
Refills: 0 | Status: DISCONTINUED | OUTPATIENT
Start: 2023-09-25 | End: 2023-09-29

## 2023-09-25 RX ORDER — INSULIN LISPRO 100/ML
2 VIAL (ML) SUBCUTANEOUS
Refills: 0 | Status: DISCONTINUED | OUTPATIENT
Start: 2023-09-25 | End: 2023-09-29

## 2023-09-25 RX ORDER — ACETAMINOPHEN 500 MG
1000 TABLET ORAL ONCE
Refills: 0 | Status: COMPLETED | OUTPATIENT
Start: 2023-09-25 | End: 2023-09-25

## 2023-09-25 RX ORDER — LEVETIRACETAM 250 MG/1
500 TABLET, FILM COATED ORAL
Refills: 0 | Status: DISCONTINUED | OUTPATIENT
Start: 2023-09-25 | End: 2023-09-25

## 2023-09-25 RX ORDER — ACETAMINOPHEN 500 MG
650 TABLET ORAL EVERY 6 HOURS
Refills: 0 | Status: DISCONTINUED | OUTPATIENT
Start: 2023-09-25 | End: 2023-09-29

## 2023-09-25 RX ORDER — ENOXAPARIN SODIUM 100 MG/ML
40 INJECTION SUBCUTANEOUS EVERY 24 HOURS
Refills: 0 | Status: DISCONTINUED | OUTPATIENT
Start: 2023-09-25 | End: 2023-09-29

## 2023-09-25 RX ORDER — LEVETIRACETAM 250 MG/1
500 TABLET, FILM COATED ORAL
Refills: 0 | Status: DISCONTINUED | OUTPATIENT
Start: 2023-09-25 | End: 2023-09-26

## 2023-09-25 RX ORDER — CARVEDILOL PHOSPHATE 80 MG/1
12.5 CAPSULE, EXTENDED RELEASE ORAL EVERY 12 HOURS
Refills: 0 | Status: DISCONTINUED | OUTPATIENT
Start: 2023-09-25 | End: 2023-09-25

## 2023-09-25 RX ADMIN — ENOXAPARIN SODIUM 40 MILLIGRAM(S): 100 INJECTION SUBCUTANEOUS at 18:22

## 2023-09-25 RX ADMIN — ATORVASTATIN CALCIUM 80 MILLIGRAM(S): 80 TABLET, FILM COATED ORAL at 21:57

## 2023-09-25 RX ADMIN — Medication 2: at 18:23

## 2023-09-25 RX ADMIN — SODIUM CHLORIDE 250 MILLILITER(S): 9 INJECTION INTRAMUSCULAR; INTRAVENOUS; SUBCUTANEOUS at 00:13

## 2023-09-25 RX ADMIN — Medication 2 UNIT(S): at 18:23

## 2023-09-25 RX ADMIN — Medication 3 MILLILITER(S): at 02:18

## 2023-09-25 RX ADMIN — Medication 3 MILLILITER(S): at 02:31

## 2023-09-25 RX ADMIN — GABAPENTIN 300 MILLIGRAM(S): 400 CAPSULE ORAL at 18:22

## 2023-09-25 RX ADMIN — Medication 3 MILLILITER(S): at 01:50

## 2023-09-25 RX ADMIN — Medication 400 MILLIGRAM(S): at 02:19

## 2023-09-25 RX ADMIN — INSULIN GLARGINE 13 UNIT(S): 100 INJECTION, SOLUTION SUBCUTANEOUS at 22:04

## 2023-09-25 RX ADMIN — LEVETIRACETAM 400 MILLIGRAM(S): 250 TABLET, FILM COATED ORAL at 01:15

## 2023-09-25 NOTE — ED PROVIDER NOTE - PHYSICAL EXAMINATION
Exam as stated below:   CONSTITUTIONAL: In NAD.   SKIN: Warm dry.   Head w/o overt   Trauma.  Contusion slight, to the anterior portion of her forehead.  No khan sign.  Pupils equal round reactive.  Approximately 4 mm bilaterally.   No septal hematoma noted. Collared. No tongue lacs.  EYES: No scleral icterus. Conjunctiva pink.  CARD: RRR. No murmurs.  RESP: Clear to ausculation b/l. No Crackles noted. No Wheezing noted.  ABD: Soft. Non-tender. Not distended. No ecchymosis noted.   MSK: No pedal edema. No calf tenderness.  NEURO: UE/LE grossly intact. Motor UE/LE sensation grossly intact. CN II-XII grossly intact.   PSYCH: Cooperative, appropriate.

## 2023-09-25 NOTE — PHYSICAL THERAPY INITIAL EVALUATION ADULT - PERTINENT HX OF CURRENT PROBLEM, REHAB EVAL
Pt is a 72-year-old woman admitted to 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 The fall occurred when transferring from the wheelchair in the bathroom, with + head strike associated with loss of consciousness, n/v. Hospital course: While in the ED, following CTH, pt became unresponsive with questionable shaking episode featuring RUE shaking <5 min/contraction, staring off into distance, and vomiting. Pt given 1g Keppra load at this time; while pending intubation she returned to baseline. Of note, GOC discussion in ED, pt relayed that she would not to be intubated or receive chest compressions. DNR/DNI status confirmed w/ pt ** +c/f seizure-like activity in ED w/ RUE shaking and contractions. VS within normal limits and imaging reassuring but lab work significant for leukocytosis to 11.63, lactate 2.1, infectious w/u pending. CT chest: No acute posttraumatic finding in the chest, abdomen and pelvis. CTH:  No acute intracranial CT abnormality. CT cspine: No acute fracture or traumatic malalignment in the cervical spine. Per neuro, S/p Ct scan pt had AMS with questionable shaking episode, staring into distance, then vomited c/f aspiration. When pending intubation pt recovered from confusion. Per primary team RUE contraction + RUE shaking <5 minutes. Given keppra load 1g after resolution of shaking/contraction when patient had altered consciousness. +RUE contraction/jerking after head strike, likely seizure on impact less likely epileptogenic in etiology.

## 2023-09-25 NOTE — H&P ADULT - NSHPREVIEWOFSYSTEMS_GEN_ALL_CORE
REVIEW OF SYSTEMS:    ** CONSTITUTIONAL: No weakness, fevers or chills  EYES/ENT: No visual changes;  No vertigo or throat pain   NECK: No pain or stiffness  RESPIRATORY: No cough, wheezing, hemoptysis; No shortness of breath  CARDIOVASCULAR: No chest pain or palpitations  GASTROINTESTINAL: No abdominal or epigastric pain. No nausea, vomiting, or hematemesis; No diarrhea or constipation. No melena or hematochezia.  GENITOURINARY: No dysuria, frequency or hematuria  NEUROLOGICAL: No numbness or weakness  SKIN: No itching, rashes REVIEW OF SYSTEMS:    CONSTITUTIONAL: +Weakness, No fevers or chills  EYES/ENT: No visual changes;  No vertigo or throat pain   NECK: No pain or stiffness  RESPIRATORY: No cough, wheezing, hemoptysis; No shortness of breath  CARDIOVASCULAR: No chest pain or palpitations  GASTROINTESTINAL: No abdominal or epigastric pain. No nausea, vomiting, or hematemesis at present; No diarrhea or constipation. No melena or hematochezia.  GENITOURINARY: No dysuria, frequency or hematuria  NEUROLOGICAL: No numbness or weakness  SKIN: No itching, rashes

## 2023-09-25 NOTE — ED PROVIDER NOTE - ATTENDING CONTRIBUTION TO CARE
Patient with history of neoplastic duodenal ca, esophageal neoplasm, dm, bipolar, bile duct obstructions, biliary drain in place, fall today transferring from the wheelchair in the bathroom + head strike and loc c n/v. + cp ten hours prior. rt shoulder pain, intact movement.   GCS 15  Bacilio Carmona MD, FACEP: In this physician's medical judgement based on clinical history and physical exam the patient's signs and symptoms lead to differential diagnoses which includes but is not limited to: sdh, chest wall injury fracture, acs, right shoulder injury    Historical features, symptoms, and clinical exam not consistent with: sepsis    Labs were ordered and independently reviewed by me.  EKG was ordered and independently reviewed by me.  Imaging was ordered and reviewed by me.      Appropriate medications for the patient's presenting complaints were ordered, and effects were reassessed.     Patient's records including prior hospital visit, med and medical history were reviewed.   History assisted by   Escalation to admission/observation was considered.    Will follow up on labs, therapeutics, imaging, reassess and disposition as clinically indicated.  *The above represents an initial assessment/impression. Please refer to my progress notes below for potential changes in patient clinical course* Patient with history of neoplastic duodenal ca, esophageal neoplasm, dm, bipolar, bile duct obstructions, biliary drain in place, fall today transferring from the wheelchair in the bathroom + head strike and loc c n/v. + cp ten hours prior. rt shoulder pain, intact movement.   GCS 15  Bacilio Carmona MD, FACEP: In this physician's medical judgement based on clinical history and physical exam the patient's signs and symptoms lead to differential diagnoses which includes but is not limited to: sdh, chest wall injury fracture, acs, right shoulder injury    Historical features, symptoms, and clinical exam not consistent with: sepsis    Labs were ordered and independently reviewed by me.  EKG was ordered and independently reviewed by me. No STEMI, no changes from prior.  Imaging was ordered and reviewed by me.      Appropriate medications for the patient's presenting complaints were ordered, and effects were reassessed.     Patient's records including prior hospital visit, med and medical history were reviewed.   History assisted by   Escalation to admission/observation was considered.    Will follow up on labs, therapeutics, imaging, reassess and disposition as clinically indicated.  *The above represents an initial assessment/impression. Please refer to my progress notes below for potential changes in patient clinical course*

## 2023-09-25 NOTE — H&P ADULT - NSHPLABSRESULTS_GEN_ALL_CORE
10.5   11.63 )-----------( 313      ( 25 Sep 2023 00:22 )             31.3     09-25    132<L>  |  95<L>  |  34<H>  ----------------------------<  189<H>  4.3   |  21<L>  |  1.64<H>    Ca    9.4      25 Sep 2023 00:22    TPro  7.6  /  Alb  3.2<L>  /  TBili  1.3<H>  /  DBili  x   /  AST  49<H>  /  ALT  46<H>  /  AlkPhos  450<H>  09-25    PT/INR - ( 25 Sep 2023 00:22 )   PT: 13.1 sec;   INR: 1.26 ratio    PTT - ( 25 Sep 2023 00:22 )  PTT:26.8 sec    Troponin T, High Sensitivity (09.25.23 @ 02:48)   Troponin T, High Sensitivity Result: 33Creatine Kinase, Serum (09.25.23 @ 02:21)   Creatine Kinase, Serum: 18 U/L< from: CT Abdomen and Pelvis w/ IV Cont (09.25.23 @ 01:09) >    FINDINGS:  CHEST:  LUNGS AND LARGE AIRWAYS: No pulmonary contusion or laceration. No   suspicious pulmonary mass.  PLEURA: No effusion or pneumothorax.  VESSELS: Pulmonary arteries are opacified to the proximal segmental   level, no acute thromboembolus. Main pulmonary artery is normal in   caliber. No thoracic aortic aneurysm or dissection. Coronary artery   calcifications.  HEART: Cardiomegaly. No pericardial effusion.  MEDIASTINUM AND FLORENCIO: No mediastinal hemorrhage or adenopathy  CHEST WALL AND LOWER NECK: Unremarkable    ABDOMEN AND PELVIS:  LIVER: Enlarged  BILE DUCTS: Percutaneous transhepatic biliary drain terminates in the   region of the ampulla. Moderate intrahepatic biliary ductal dilatation.   Cystic duct is dilated. Common duct is dilated up to 1.4 cm.    GALLBLADDER: Cholelithiasis.  SPLEEN: Within normal limits.  PANCREAS: Fatty infiltration of the pancreatic head  ADRENALS: Within normal limits  KIDNEYS/URETERS: Within normal limits.    BLADDER: Within normal limits.  REPRODUCTIVE ORGANS: Unremarkable CT appearance for age    BOWEL: Stent extends from the gastric antrum to the third portion of the   duodenum. No abnormal gastric distention. No bowel obstruction.. Appendix   is not visualized, no secondary findings of appendicitis.  PERITONEUM: No free air or free fluid  VESSELS: Atherosclerotic changes.  RETROPERITONEUM/LYMPH NODES: No enlarged lymph node measuring greater   than 10 mm in short axis  ABDOMINAL WALL: Small fat-containing periumbilical hernia  BONES: No destructive osseous lesion. No acute fracture.    IMPRESSION:    1. No acute posttraumatic finding in the chest, abdomen and pelvis.    < end of copied text >    < from: CT Head No Cont (09.25.23 @ 01:05) >    FINDINGS:  CT head:    Mild to moderate cerebral atrophy and mild chronic microvascular ischemic   change. Gray-white differentiation is preserved. The ventricles and sulci   are normal in size and configuration. The basilar cisterns are clear. No   focal edema or acute mass effect.There is no intracranial fluid   collection or acute hemorrhage.    There is no fracture identified. Opacified right frontal sinus. Scalp and   imaged facial soft tissues are within normal limits.    CT cervical spine:    Spinal alignment is anatomic. Craniocervical junction is normal. No acute   fracture. Vertebral body heights are maintained. Normal bone   mineralization. Disc osteophyte complex at C5-6 results in narrowing of   the central canal to 8 mm..    No gross upper cervical canal hematoma or mass. Cervical soft tissues are   unremarkable.      IMPRESSION:    1. No acute intracranial CT abnormality.  2. No acute fracture or traumatic malalignment in the cervical spine.    < end of copied text > 10.5   11.63 )-----------( 313      ( 25 Sep 2023 00:22 )             31.3     09-25    132<L>  |  95<L>  |  34<H>  ----------------------------<  189<H>  4.3   |  21<L>  |  1.64<H>    Ca    9.4      25 Sep 2023 00:22    TPro  7.6  /  Alb  3.2<L>  /  TBili  1.3<H>  /  DBili  x   /  AST  49<H>  /  ALT  46<H>  /  AlkPhos  450<H>  09-25    PT/INR - ( 25 Sep 2023 00:22 )   PT: 13.1 sec;   INR: 1.26 ratio    PTT - ( 25 Sep 2023 00:22 )  PTT:26.8 sec    Troponin T, High Sensitivity (09.25.23 @ 02:48)   Troponin T, High Sensitivity Result: 33    Creatine Kinase, Serum (09.25.23 @ 02:21)   Creatine Kinase, Serum: 18 U/L    < from: CT Abdomen and Pelvis w/ IV Cont (09.25.23 @ 01:09) >    FINDINGS:  CHEST:  LUNGS AND LARGE AIRWAYS: No pulmonary contusion or laceration. No   suspicious pulmonary mass.  PLEURA: No effusion or pneumothorax.  VESSELS: Pulmonary arteries are opacified to the proximal segmental   level, no acute thromboembolus. Main pulmonary artery is normal in   caliber. No thoracic aortic aneurysm or dissection. Coronary artery   calcifications.  HEART: Cardiomegaly. No pericardial effusion.  MEDIASTINUM AND FLORENCIO: No mediastinal hemorrhage or adenopathy  CHEST WALL AND LOWER NECK: Unremarkable    ABDOMEN AND PELVIS:  LIVER: Enlarged  BILE DUCTS: Percutaneous transhepatic biliary drain terminates in the   region of the ampulla. Moderate intrahepatic biliary ductal dilatation.   Cystic duct is dilated. Common duct is dilated up to 1.4 cm.    GALLBLADDER: Cholelithiasis.  SPLEEN: Within normal limits.  PANCREAS: Fatty infiltration of the pancreatic head  ADRENALS: Within normal limits  KIDNEYS/URETERS: Within normal limits.    BLADDER: Within normal limits.  REPRODUCTIVE ORGANS: Unremarkable CT appearance for age    BOWEL: Stent extends from the gastric antrum to the third portion of the   duodenum. No abnormal gastric distention. No bowel obstruction.. Appendix   is not visualized, no secondary findings of appendicitis.  PERITONEUM: No free air or free fluid  VESSELS: Atherosclerotic changes.  RETROPERITONEUM/LYMPH NODES: No enlarged lymph node measuring greater   than 10 mm in short axis  ABDOMINAL WALL: Small fat-containing periumbilical hernia  BONES: No destructive osseous lesion. No acute fracture.    IMPRESSION:    1. No acute posttraumatic finding in the chest, abdomen and pelvis.    < end of copied text >    < from: CT Head No Cont (09.25.23 @ 01:05) >    FINDINGS:  CT head:    Mild to moderate cerebral atrophy and mild chronic microvascular ischemic   change. Gray-white differentiation is preserved. The ventricles and sulci   are normal in size and configuration. The basilar cisterns are clear. No   focal edema or acute mass effect. There is no intracranial fluid   collection or acute hemorrhage.    There is no fracture identified. Opacified right frontal sinus. Scalp and   imaged facial soft tissues are within normal limits.    CT cervical spine:    Spinal alignment is anatomic. Craniocervical junction is normal. No acute   fracture. Vertebral body heights are maintained. Normal bone   mineralization. Disc osteophyte complex at C5-6 results in narrowing of   the central canal to 8 mm..    No gross upper cervical canal hematoma or mass. Cervical soft tissues are   unremarkable.      IMPRESSION:    1. No acute intracranial CT abnormality.  2. No acute fracture or traumatic malalignment in the cervical spine.    < end of copied text >

## 2023-09-25 NOTE — CONSULT NOTE ADULT - ATTENDING COMMENTS
Ms. Douglass is a 72-year-old with neoplastic duodenal ca, esophageal neoplasm, dm bipolar, hypertension, bile duct obstructions, biliary drain in place, presenting s/p fall when transferring from the wheelchair in the bathroom + head strike and loc c n/v. + cp ten hours prior.   Neurology consulted due to possible seizure like event. Patient was loaded with Keppra. CT head negative.  During my evaluation, patient is back to baseline and non focal exam. Clinically low suspicious for seizure.   I agree with above exam, assessment and plan unless noted below,  Will get EEG > 2 hours if negative then discontinue Keppra.   Continue medical management, neuro- check and fall precaution.  GI and DVT prophylaxis.    Seizure Precautions discussed:  1. No driving for 1 year from date of last seizure as per [place] law  2. No taking a bath alone or showering with standing water > 2 inches  3. No swimming unsupervised  4. No use of automatic or semi-automatic firearms and no using other firearms unsupervised  5. No use of heavy machinery unsupervised  6. No cooking over an open flame on the front burners (back burners OK)  7. For additional recommendations please discuss with neurology or PCP as outpatient    I discussed the diagnosis and treatment plan with the patient. All questions and concerns were addressed. The patient demonstrated good understanding of the treatment plan.  If you have any further questions, please do not hesitate to contact our consult service.  Thank you for allowing us to participate in this patient care.

## 2023-09-25 NOTE — H&P ADULT - PROBLEM SELECTOR PLAN 6
- Hold home Rishabhumet   - Per paperwork from rehab, 15 U basal insulin and 2U pre-meal, however family said that she was on 25-30U basal insulin  - Will start 13 U lantus and 2U premeal along with low dose SSI pending po intake  - FS qhs and before meals

## 2023-09-25 NOTE — H&P ADULT - ATTENDING COMMENTS
# fall  # seizure activity  # duodenal CA s/p stent  # transaminitis    - pt with fall/head trauma/LOC at rehab  - witnessed seizure activity in ED s/p keppra load  - appreciate neuro recs; obtain EEG and pending the result, will determine further course of keppra  - fall likely in setting of deconditioning but will complete workups including ID and cards  - check procal, BCx, RVP, UA, UCx  - obtain TTE  - obtain RUQ sonogram in setting of minimal drainage as well as elevated alk phos  - PT consult    Cleo Bob MD  Division of Hospital Medicine  Contact via Microsoft Teams  Office: [phone]

## 2023-09-25 NOTE — H&P ADULT - ASSESSMENT
72-year-old woman DNR/DNI with neoplastic duodenal cancer, esophageal neoplasm, T2DM, bipolar disorder, hypertension, bile duct obstructions w/ biliary drain in place, presenting s/p fall from her wheelchair *** c/f seizure-like activity in ED w/ RUE shaking and contractions. VS within normal limits and imaging reassuring but lab work significant for leukocytosis to 11.63, lactate 2.1, infectious w/u pending 72-year-old woman DNR/DNI with neoplastic duodenal cancer, esophageal neoplasm, T2DM, bipolar disorder, hypertension, bile duct obstructions w/ biliary drain in place, presenting s/p fall from wheelchair onto bathroom floor, striking forehead, at [facility]; also with seizure-like activity in ED w/ RUE shaking and contractions. Neuro consulted, likely seizure due to head strike. Pt afebrile, normotensive and reassuring imaging studies, however lab work significant for leukocytosis to 11.63, lactate 2.1. Infectious w/u pending. W/u also ongoing to r/o other causes of syncope, as pt does not recall her fall.

## 2023-09-25 NOTE — H&P ADULT - NSICDXPASTMEDICALHX_GEN_ALL_CORE_FT
PAST MEDICAL HISTORY:  Bipolar disorder     DM (diabetes mellitus)     Duodenal cancer     HTN (hypertension)     Neuropathy     Obstruction of biliary tree

## 2023-09-25 NOTE — ED ADULT NURSE REASSESSMENT NOTE - NS ED NURSE REASSESS COMMENT FT1
0025-Pt to CT scan.   0100-pt returned to ED Red. CT tech noticed pt to become unresponsive when asked her name. ED RN, ED MD Martin, MD Crawley at bedside. Pt vomited and still unresponsive. Pt brought into to red rm 39R. Pt placed back on cardiac monitor. RT at bedside. Pt placed on 15L NRB. O2 saturation improved, pt returned to baseline mental status (A&Ox4). .

## 2023-09-25 NOTE — H&P ADULT - PROBLEM SELECTOR PLAN 2
- WBC count 11.63, Procal 5.4, and lactate 2.1 on VBG however no fever or clear source of infection   - RUQ ultrasound to r/o biliary infection; per son, biliary drain is putting out significantly less fluid than normal. Flush drain BID  - No evidence of intraabdominal/intrapelvic infection on CT  - BCx, UCx  - Will broaden infectious w/u if pt's presentation worsens, however based on clinical picture low concern for infection and no need for empiric antibiotic treatment at this time   - S/p Levaquin in ED?

## 2023-09-25 NOTE — H&P ADULT - HISTORY OF PRESENT ILLNESS
72-year-old woman with neoplastic duodenal cancer, esophageal neoplasm, T2DM, bipolar disorder, hypertension, bile duct obstructions w/ biliary drain in place, presenting s/p fall. The fall occurred when transferring from the wheelchair in the bathroom, with + head strike associated with loss of consciousness, n/v.     While in the ED, following CTH, pt became unresponsive with questionable shaking episode featuring RUE shaking <5 min/contraction, staring off into distance, and vomiting. Pt given 1g Keppra load at this time; while pending intubation she returned to baseline.     Of note, GOC discussion in ED, pt relayed that she would not to be intubated or receive chest compressions. DNR/DNI status confirmed w/ pt ** 72-year-old woman with neoplastic duodenal cancer, esophageal neoplasm, T2DM, bipolar disorder, hypertension, bile duct obstructions w/ biliary drain in place, [facility]. The fall occurred when transferring from the wheelchair in the bathroom while at rehab facility, hitting her forehead, associated with brief loss of consciousness, nausea, and vomiting.     Per family, pt was recently hospitalized at [facility] and found to have duodenal tumor ~4 weeks ago. Pt underwent gastric/duodenal stenting due to ?obstruction as well as percutaneous transhepatic biliary drain placement; she was d/c to [facility] facility on 9/22. Over the past several months, she has had multiple falls 2/2 weakness but none w/ head trauma.    While in the ED, following CTH, pt became unresponsive with questionable shaking episode featuring RUE shaking <5 min/contraction, staring off into distance, and vomiting. Pt given 1g Keppra load at this time; while pending intubation she returned to baseline. Neuro consulted, felt seizure was likely and recommending to continue w/ Keppra.    At this time pt feeling tired due to lack of sleep in the ED, but denies headache, dizziness, lightheadedness, chest pain, shortness of breath, fever, chills, palpitations. Pt able to speak English but prefers son translate for her.    Of note, GOC discussion in ED, pt relayed that she would not to be intubated or receive chest compressions. DNR/DNI, MOLST in chart.

## 2023-09-25 NOTE — ED PROVIDER NOTE - HIV OFFER
Pt presents to the ED for left flank pain x10 days but states it has gotten worse in the last 2 days. Pt states he went to urology and they gave him antibiotics for a UTI.  Pt states he has a known kidney stone on the left side and has a CT scan on 4/21
Opt out

## 2023-09-25 NOTE — ED PROVIDER NOTE - PROGRESS NOTE DETAILS
HALEIGH Crawley PGY-2:  status post CAT scan, patient laying flat returned with altered mental status.  Pupils 3 mm bilaterally.  Questionable shaking.  Patient confused and staring into the distance.  Patient then vomited, concern for aspiration.  Patient was raised to a Fowlers position, vomitus cleared.  While preparing for intubation, patient recovered from  confusion, using , patient stated she did not want a breathing tube and she did not want CPR should her heart stop.  patient was not intubated due to return of mental status. HALEIGH Crawley PGY-2:  discussed with neuro,  likely not epileptic in nature.  Likely due to head trauma.  They suggest Keppra and admission for further work-up.  No indication for EEG at this time.

## 2023-09-25 NOTE — ED PROVIDER NOTE - OBJECTIVE STATEMENT
HPI & ROS: 72-year-old female with a history of malignant neoplasm of duodenum, hypertensive heart disease without heart failure, diabetes, GERD, obstruction of bile duct with a biliary drain in place, angina pectoris, presenting with syncope earlier today.  in her normal health today.  Patient was going to the bathroom in her wheelchair and then stood up and then fell forward onto her forehead.  There was a loss of consciousness.  There was  nausea and one episode of vomiting.  Patient not on blood thinners.  Patient without diarrhea.  No dysuria.  No congestion or recent cough. No CP or SOb when this occurred. [place] like the room was spinning after she stood up, no lightheadedness.

## 2023-09-25 NOTE — PHYSICAL THERAPY INITIAL EVALUATION ADULT - ADDITIONAL COMMENTS
pt states was previously at [facility], however prior to last hospitalization. pt lives alone, first floor, no stairs. pt has HHA 6h/7d. pt owns rolling walker

## 2023-09-25 NOTE — H&P ADULT - PROBLEM SELECTOR PLAN 8
- DVT PPx: Lovenox 40 mg   - PT: Consult placed  - Diet: Carb consistent, soft and bite sized  - GI: Simethicone q4

## 2023-09-25 NOTE — H&P ADULT - PROBLEM SELECTOR PLAN 1
- Pt w/ <5 min of RUE contraction and shaking in ED with unresponsiveness; no seizure history in past, but hit head on bathroom floor at rehab facility   - Keppra loaded (1g) following which pt returned to baseline  - Per neuro, spot EEG and felt presentation was likely 2/2 seizure-activity due to fall and hitting head

## 2023-09-25 NOTE — ED PROVIDER NOTE - CLINICAL SUMMARY MEDICAL DECISION MAKING FREE TEXT BOX
MDM/Summary/DDx (includes but is not limited to):  72-year-old female with a history of neoplasm of duodenum, hypertensive heart disease, diabetes, obstruction of bile duct with biliary drain, coming from her rehabilitation center with possible syncope, with head trauma.  Patient collared at this time.  Vital signs are stable and patient without fever.   Less likely infectious cause, could be due to neoplasm, could be electrolyte versus anemia.  No overt signs of bleeding though.  Labs:  CBC CMP  VBG UA UC PT PTT type and screen  Imaging:  pan scan, chest and pelvic x-ray  Tx: Supportive, pain/nausea medications as pt requires/requests.  Consults/Resources: pending   Dispo: likely admit     Triage note reviewed. VS reviewed. EKG reviewed and documented in "RESULTS" section, if possible at given time.     DDx in MDM includes the most likely ddx, but is not limited to solely what is listed. Clinical course may alter/deviate from the above plan. When possible, progress notes written, as needed, and are included in "PROGRESS NOTE" section below.       Medical, family, and social determinants of health reviewed and discussed w/ pt/family/caretaker, when allowable, and is incorporated into note above, whenever possible.

## 2023-09-25 NOTE — PATIENT PROFILE ADULT - FUNCTIONAL ASSESSMENT - BASIC MOBILITY 5.
Pt will be able to negotiate 3 stpes facing the rails sideways, 13 steps with right rail up, left sided cane independently in 1 week/balance training/bed mobility training/gait training/ROM/strengthening/transfer training 2 = A lot of assistance

## 2023-09-25 NOTE — ED ADULT NURSE NOTE - OBJECTIVE STATEMENT
72y F PMH neoplasm duodenum, HTN, DM, [facility]. EMS reports pt was transferring from wheelchair to toilet when she became dizzy, fell forward onto her forehead. Pt had episode of vomiting s/p fall. +LOC. No notable abrasions/lacerations. Denies AC use. Pt reports R hip, R shoulder pain. Denies cp, sob, n/d, cough. Cardiac monitor placed. Comfort and safety maintained.

## 2023-09-25 NOTE — ED PROVIDER NOTE - PARTICIPANTS
[id number]. Discussed with the patient after event today, capacity is intact and patient is back to her baseline.  We discussed that the patient would not want to be intubated and the patient would not want resuscitation should her heart stop.  She understands the risks of this and wishes that these measures not be in place should her heart stop or she need to be intubated.  MOST form was filled out and is in chart./Patient

## 2023-09-25 NOTE — PATIENT PROFILE ADULT - NSPROMEDSADMININFO_GEN_A_NUR
Sleep specialist referral placed - patient will need to call to schedule.    [facility] [phone]       no concerns

## 2023-09-25 NOTE — ED ADULT NURSE NOTE - NSFALLRISKINTERV_ED_ALL_ED
Assistance OOB with selected safe patient handling equipment if applicable/Assistance with ambulation/Communicate fall risk and risk factors to all staff, patient, and family/Encourage patient to sit up slowly, dangle for a short time, stand at bedside before walking/Monitor gait and stability/Monitor for mental status changes and reorient to person, place, and time, as needed/Orthostatic vital signs/Provide visual cue: yellow wristband, yellow gown, etc/Reinforce activity limits and safety measures with patient and family/Toileting schedule using arm’s reach rule for commode and bathroom/Use of alarms - bed, stretcher, chair and/or video monitoring/Call bell, personal items and telephone in reach/Instruct patient to call for assistance before getting out of bed/chair/stretcher/Non-slip footwear applied when patient is off stretcher/[place] to call system/Physically safe environment - no spills, clutter or unnecessary equipment/Purposeful Proactive Rounding/Room/bathroom lighting operational, light cord in reach

## 2023-09-25 NOTE — H&P ADULT - NSHPPHYSICALEXAM_GEN_ALL_CORE
VITALS:   T(C): 36.7 (09-25-23 @ 06:00), Max: 37.1 (09-24-23 @ 22:34)  HR: 63 (09-25-23 @ 06:00) (63 - 66)  BP: 118/80 (09-25-23 @ 06:00) (109/62 - 138/67)  RR: 18 (09-25-23 @ 06:00) (16 - 20)  SpO2: 95% (09-25-23 @ 06:00) (95% - 100%)    GENERAL: NAD, lying in bed comfortably  HEAD:  Atraumatic, normocephalic  EYES: EOMI, PERRLA, conjunctiva and sclera clear  ENT: Moist mucous membranes  NECK: Supple, no JVD  HEART: Regular rate and rhythm, no murmurs, rubs, or gallops  LUNGS: Unlabored respirations.  Clear to auscultation bilaterally, no crackles, wheezing, or rhonchi  ABDOMEN: Soft, nontender, nondistended, +BS  EXTREMITIES: 2+ peripheral pulses bilaterally. No clubbing, cyanosis, or edema  NERVOUS SYSTEM: ***** A&Ox3, no focal deficits   SKIN: No rashes or lesions VITALS:   T(C): 36.7 (09-25-23 @ 06:00), Max: 37.1 (09-24-23 @ 22:34)  HR: 63 (09-25-23 @ 06:00) (63 - 66)  BP: 118/80 (09-25-23 @ 06:00) (109/62 - 138/67)  RR: 18 (09-25-23 @ 06:00) (16 - 20)  SpO2: 95% (09-25-23 @ 06:00) (95% - 100%)    GENERAL: NAD, lying in bed comfortably  HEAD:  Atraumatic, normocephalic  EYES: EOMI, PERRLA, conjunctiva and sclera clear  ENT: Moist mucous membranes  NECK: Supple, no JVD  HEART: Regular rate and rhythm, no murmurs, rubs, or gallops  LUNGS: Unlabored respirations.  Clear to auscultation bilaterally, no crackles, wheezing, or rhonchi  ABDOMEN: Soft, nontender, nondistended, +BS. Biliary drain in place, minimal serosanguinous output.   EXTREMITIES: 2+ peripheral pulses bilaterally. No clubbing, cyanosis, or edema  NERVOUS SYSTEM: Cranial nerves II-XII intact. 5/5 strength in all 4 extremities. Unable to assess gait 2/2 weakness. A&Ox3, no focal deficits   SKIN: No rashes or lesions

## 2023-09-25 NOTE — CONSULT NOTE ADULT - ASSESSMENT
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 + cp ten hours prior. Neurology consulted c/f seizures.     IMPRESSION     RECOMMENDATION  work in progress please ignore      Patient to be seen by team and attending. Note finalized upon attending attestation.  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 + cp ten hours prior. Neurology consulted c/f seizures. Patient was loaded with keppra. CT head negative.    IMPRESSION   RUE contraction/jerking after head strike, likely seizure on impact less likely epileptogenic in etiology.    RECOMMENDATION    [] can c/w keppra 500 BID  [] pain management per primary team  [] recommend infectious workup including procal  [] consider medicine admission given labs concerning for infection, appreciate recs    Case discussed with Dr. Watson  Patient to be seen by team and attending. Note finalized upon attending attestation.

## 2023-09-25 NOTE — PATIENT PROFILE ADULT - FALL HARM RISK - HARM RISK INTERVENTIONS
Assistance with ambulation/Assistance OOB with selected safe patient handling equipment/Communicate Risk of Fall with Harm to all staff/Discuss with provider need for PT consult/Monitor for mental status changes/Monitor gait and stability/Provide patient with walking aids - walker, cane, crutches/Reinforce activity limits and safety measures with patient and family/Reorient to person, place and time as needed/Tailored Fall Risk Interventions/Use of alarms - bed, chair and/or voice tab/Visual Cue: Yellow wristband and red socks/Bed in lowest position, wheels locked, appropriate side rails in place/Call bell, personal items and telephone in reach/Instruct patient to call for assistance before getting out of bed or chair/Non-slip footwear when patient is out of bed/[place] to call system/Physically safe environment - no spills, clutter or unnecessary equipment/Purposeful Proactive Rounding/Room/bathroom lighting operational, light cord in reach

## 2023-09-25 NOTE — ED PROVIDER NOTE - CARE PLAN
1 Principal Discharge DX:	Seizure  Secondary Diagnosis:	Head injury   Patient/surrogate refused vaccine...

## 2023-09-25 NOTE — H&P ADULT - PROBLEM SELECTOR PLAN 4
- S/p percutaneous intrahepatic biliary drain placement   - Flush drain BID  - RUQ ultrasound as discussed above

## 2023-09-25 NOTE — CONSULT NOTE ADULT - SUBJECTIVE AND OBJECTIVE BOX
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 + cp ten hours prior.     Neurology consulted c/f seizures.    S/p Ct scan pt had AMS with questionable shaking episode, staring into distance, then vomited c/f aspiration. When pending intubation pt recovered from confusion. Per primary team RUE contraction + RUE shaking <5 minutes. Given keppra load 1g after resolution of shaking/contraction when patient had altered consciousness.    Review of Systems:    NEUROLOGICAL: +As stated in HPI above  All other review of systems is negative unless indicated above.    Allergies:  Keflex (Urticaria)  penicillins (Unknown)    PMHx/PSHx/Family Hx: As above, otherwise see below   HTN (hypertension)  DM (diabetes mellitus)  Neuropathy    Social Hx:  Baseline functional status is uses wheelchair/walker, has HHA    Medications:  MEDICATIONS  (STANDING):  acetaminophen   IVPB .. 1000 milliGRAM(s) IV Intermittent Once  albuterol/ipratropium for Nebulization.. 3 milliLiter(s) Nebulizer every 20 minutes  levoFLOXacin IVPB 750 milliGRAM(s) IV Intermittent once  levoFLOXacin IVPB      MEDICATIONS  (PRN):      Vitals:  T(C): 37.1 (09-24-23 @ 22:34), Max: 37.1 (09-24-23 @ 22:34)  HR: 66 (09-25-23 @ 02:00) (64 - 66)  BP: 116/89 (09-25-23 @ 02:00) (109/62 - 138/67)  RR: 19 (09-25-23 @ 02:00) (16 - 20)  SpO2: 96% (09-25-23 @ 02:00) (96% - 100%)    Physical Examination:   General - non-toxic appearing female, increased and coarse breathing    Neurologic Exam:  Mental status - Awake, Alert, Oriented to person, place, and time. Speech fluent. Follows simple commands complex not assessed.  Cranial nerves - PERRLA (4mm -> 3mm b/l), VFF, EOMI, face sensation (V1-V3) intact b/l, facial strength intact without asymmetry b/l, hearing intact b/l  Motor - Normal bulk and tone throughout. No pronator drift. C-collar in place.  Strength testing able to maintain antigravity 10 seconds UE, 5 seconds LE but patient declines further strength exam,  strength 4/5 R, 5/5 L.   Sensation - Light touch intact throughout  DTR's - 2+ bilat UE, 1+ bilat LE, plantars neutral  Coordination - RAVIN, patient only able to raise arms slightly above bed and not full ROM  Gait and station - deferred for patient safety    Labs:                        10.5   11.63 )-----------( 313      ( 25 Sep 2023 00:22 )             31.3     09-25    132<L>  |  95<L>  |  34<H>  ----------------------------<  189<H>  4.3   |  21<L>  |  1.64<H>    Ca    9.4      25 Sep 2023 00:22    TPro  7.6  /  Alb  3.2<L>  /  TBili  1.3<H>  /  DBili  x   /  AST  49<H>  /  ALT  46<H>  /  AlkPhos  450<H>  09-25    CAPILLARY BLOOD GLUCOSE      POCT Blood Glucose.: 219 mg/dL (25 Sep 2023 01:08)    LIVER FUNCTIONS - ( 25 Sep 2023 00:22 )  Alb: 3.2 g/dL / Pro: 7.6 g/dL / ALK PHOS: 450 U/L / ALT: 46 U/L / AST: 49 U/L / GGT: x         PT/INR - ( 25 Sep 2023 00:22 )   PT: 13.1 sec;   INR: 1.26 ratio    PTT - ( 25 Sep 2023 00:22 )  PTT:26.8 sec    Radiology:  CT head/C-spine w/o contrast  1. No acute intracranial CT abnormality.  2. No acute fracture or traumatic malalignment in the cervical spine.   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 + cp ten hours prior.     Neurology consulted c/f seizures.    S/p Ct scan pt had AMS with questionable shaking episode, staring into distance, then vomited c/f aspiration. When pending intubation pt recovered from confusion. Per primary team RUE contraction + RUE shaking <5 minutes. Given keppra load 1g after resolution of shaking/contraction when patient had altered consciousness.     Patient seen at bedside. Reports feeling better.    Review of Systems:    NEUROLOGICAL: +As stated in HPI above  All other review of systems is negative unless indicated above.    Allergies:  Keflex (Urticaria)  penicillins (Unknown)    PMHx/PSHx/Family Hx: As above, otherwise see below   HTN (hypertension)  DM (diabetes mellitus)  Neuropathy    Social Hx:  Baseline functional status is uses wheelchair/walker, has HHA    Medications:  MEDICATIONS  (STANDING):  acetaminophen   IVPB .. 1000 milliGRAM(s) IV Intermittent Once  albuterol/ipratropium for Nebulization.. 3 milliLiter(s) Nebulizer every 20 minutes  levoFLOXacin IVPB 750 milliGRAM(s) IV Intermittent once  levoFLOXacin IVPB      MEDICATIONS  (PRN):      Vitals:  T(C): 37.1 (09-24-23 @ 22:34), Max: 37.1 (09-24-23 @ 22:34)  HR: 66 (09-25-23 @ 02:00) (64 - 66)  BP: 116/89 (09-25-23 @ 02:00) (109/62 - 138/67)  RR: 19 (09-25-23 @ 02:00) (16 - 20)  SpO2: 96% (09-25-23 @ 02:00) (96% - 100%)    Physical Examination:   General - non-toxic appearing female, increased and coarse breathing    Neurologic Exam:  Mental status - Awake, Alert, Oriented to person, place, and time. Speech fluent. Follows simple commands complex not assessed.  Cranial nerves - PERRLA (4mm -> 3mm b/l), VFF, EOMI, face sensation (V1-V3) intact b/l, facial strength intact without asymmetry b/l, hearing intact b/l  Motor - Normal bulk and tone throughout. No pronator drift. C-collar in place.  Strength testing able to maintain antigravity 10 seconds UE, 5 seconds LE but patient declines further strength exam,  strength 4/5 R, 5/5 L.   Sensation - Light touch intact throughout  DTR's - 2+ bilat UE, 1+ bilat LE, plantars neutral  Coordination - RAVIN, patient only able to raise arms slightly above bed and not full ROM  Gait and station - deferred for patient safety    Labs:                        10.5   11.63 )-----------( 313      ( 25 Sep 2023 00:22 )             31.3     09-25    132<L>  |  95<L>  |  34<H>  ----------------------------<  189<H>  4.3   |  21<L>  |  1.64<H>    Ca    9.4      25 Sep 2023 00:22    TPro  7.6  /  Alb  3.2<L>  /  TBili  1.3<H>  /  DBili  x   /  AST  49<H>  /  ALT  46<H>  /  AlkPhos  450<H>  09-25    CAPILLARY BLOOD GLUCOSE      POCT Blood Glucose.: 219 mg/dL (25 Sep 2023 01:08)    LIVER FUNCTIONS - ( 25 Sep 2023 00:22 )  Alb: 3.2 g/dL / Pro: 7.6 g/dL / ALK PHOS: 450 U/L / ALT: 46 U/L / AST: 49 U/L / GGT: x         PT/INR - ( 25 Sep 2023 00:22 )   PT: 13.1 sec;   INR: 1.26 ratio    PTT - ( 25 Sep 2023 00:22 )  PTT:26.8 sec    Radiology:  CT head/C-spine w/o contrast  1. No acute intracranial CT abnormality.  2. No acute fracture or traumatic malalignment in the cervical spine.

## 2023-09-26 LAB
ANION GAP SERPL CALC-SCNC: 15 MMOL/L — SIGNIFICANT CHANGE UP (ref 5–17)
BUN SERPL-MCNC: 37 MG/DL — HIGH (ref 7–23)
CALCIUM SERPL-MCNC: 9.6 MG/DL — SIGNIFICANT CHANGE UP (ref 8.4–10.5)
CHLORIDE SERPL-SCNC: 96 MMOL/L — SIGNIFICANT CHANGE UP (ref 96–108)
CO2 SERPL-SCNC: 22 MMOL/L — SIGNIFICANT CHANGE UP (ref 22–31)
CREAT SERPL-MCNC: 1.93 MG/DL — HIGH (ref 0.5–1.3)
EGFR: 28 ML/MIN/1.73M2 — LOW
FOLATE SERPL-MCNC: 7.6 NG/ML — SIGNIFICANT CHANGE UP
GLUCOSE BLDC GLUCOMTR-MCNC: 165 MG/DL — HIGH (ref 70–99)
GLUCOSE BLDC GLUCOMTR-MCNC: 172 MG/DL — HIGH (ref 70–99)
GLUCOSE BLDC GLUCOMTR-MCNC: 198 MG/DL — HIGH (ref 70–99)
GLUCOSE BLDC GLUCOMTR-MCNC: 203 MG/DL — HIGH (ref 70–99)
GLUCOSE SERPL-MCNC: 165 MG/DL — HIGH (ref 70–99)
HCT VFR BLD CALC: 31.7 % — LOW (ref 34.5–45)
HCV AB S/CO SERPL IA: 0.11 S/CO — SIGNIFICANT CHANGE UP (ref 0–0.99)
HCV AB SERPL-IMP: SIGNIFICANT CHANGE UP
HGB BLD-MCNC: 10.6 G/DL — LOW (ref 11.5–15.5)
MAGNESIUM SERPL-MCNC: 2 MG/DL — SIGNIFICANT CHANGE UP (ref 1.6–2.6)
MCHC RBC-ENTMCNC: 28.9 PG — SIGNIFICANT CHANGE UP (ref 27–34)
MCHC RBC-ENTMCNC: 33.4 GM/DL — SIGNIFICANT CHANGE UP (ref 32–36)
MCV RBC AUTO: 86.4 FL — SIGNIFICANT CHANGE UP (ref 80–100)
MRSA PCR RESULT.: SIGNIFICANT CHANGE UP
NRBC # BLD: 0 /100 WBCS — SIGNIFICANT CHANGE UP (ref 0–0)
PHOSPHATE SERPL-MCNC: 4.2 MG/DL — SIGNIFICANT CHANGE UP (ref 2.5–4.5)
PLATELET # BLD AUTO: 286 K/UL — SIGNIFICANT CHANGE UP (ref 150–400)
POTASSIUM SERPL-MCNC: 3.9 MMOL/L — SIGNIFICANT CHANGE UP (ref 3.5–5.3)
POTASSIUM SERPL-SCNC: 3.9 MMOL/L — SIGNIFICANT CHANGE UP (ref 3.5–5.3)
RBC # BLD: 3.67 M/UL — LOW (ref 3.8–5.2)
RBC # FLD: 15 % — HIGH (ref 10.3–14.5)
S AUREUS DNA NOSE QL NAA+PROBE: SIGNIFICANT CHANGE UP
SODIUM SERPL-SCNC: 133 MMOL/L — LOW (ref 135–145)
T4 AB SER-ACNC: 10.1 UG/DL — SIGNIFICANT CHANGE UP (ref 4.6–12)
TSH SERPL-MCNC: 0.74 UIU/ML — SIGNIFICANT CHANGE UP (ref 0.27–4.2)
VIT B12 SERPL-MCNC: 777 PG/ML — SIGNIFICANT CHANGE UP (ref 232–1245)
WBC # BLD: 11.83 K/UL — HIGH (ref 3.8–10.5)
WBC # FLD AUTO: 11.83 K/UL — HIGH (ref 3.8–10.5)

## 2023-09-26 PROCEDURE — 76705 ECHO EXAM OF ABDOMEN: CPT | Mod: 26

## 2023-09-26 PROCEDURE — 93306 TTE W/DOPPLER COMPLETE: CPT | Mod: 26

## 2023-09-26 PROCEDURE — 99232 SBSQ HOSP IP/OBS MODERATE 35: CPT | Mod: GC

## 2023-09-26 PROCEDURE — 99233 SBSQ HOSP IP/OBS HIGH 50: CPT

## 2023-09-26 RX ORDER — CHLORHEXIDINE GLUCONATE 213 G/1000ML
1 SOLUTION TOPICAL
Refills: 0 | Status: DISCONTINUED | OUTPATIENT
Start: 2023-09-26 | End: 2023-09-29

## 2023-09-26 RX ADMIN — INSULIN GLARGINE 13 UNIT(S): 100 INJECTION, SOLUTION SUBCUTANEOUS at 22:19

## 2023-09-26 RX ADMIN — CHLORHEXIDINE GLUCONATE 1 APPLICATION(S): 213 SOLUTION TOPICAL at 17:40

## 2023-09-26 RX ADMIN — Medication 1: at 09:25

## 2023-09-26 RX ADMIN — Medication 2 UNIT(S): at 13:01

## 2023-09-26 RX ADMIN — GABAPENTIN 300 MILLIGRAM(S): 400 CAPSULE ORAL at 17:40

## 2023-09-26 RX ADMIN — Medication 1: at 17:39

## 2023-09-26 RX ADMIN — Medication 2: at 13:00

## 2023-09-26 RX ADMIN — PANTOPRAZOLE SODIUM 40 MILLIGRAM(S): 20 TABLET, DELAYED RELEASE ORAL at 05:28

## 2023-09-26 RX ADMIN — ENOXAPARIN SODIUM 40 MILLIGRAM(S): 100 INJECTION SUBCUTANEOUS at 17:40

## 2023-09-26 RX ADMIN — LEVETIRACETAM 500 MILLIGRAM(S): 250 TABLET, FILM COATED ORAL at 05:28

## 2023-09-26 RX ADMIN — ATORVASTATIN CALCIUM 80 MILLIGRAM(S): 80 TABLET, FILM COATED ORAL at 22:22

## 2023-09-26 RX ADMIN — Medication 2 UNIT(S): at 09:26

## 2023-09-26 RX ADMIN — GABAPENTIN 300 MILLIGRAM(S): 400 CAPSULE ORAL at 05:28

## 2023-09-26 RX ADMIN — Medication 2 UNIT(S): at 17:39

## 2023-09-26 NOTE — PROGRESS NOTE ADULT - SUBJECTIVE AND OBJECTIVE BOX
NEUROLOGY FOLLOW-UP CONSULT NOTE    RFC: RUE shaking    Interval history: No acute neurologic events overnight. [place]  assisted ID #[id number], however patient prefers his son to translate. Patient reports that her RUE intermittent tremor is chronic and she has followed up with a neurologist at [facility].    Meds:  MEDICATIONS  (STANDING):  atorvastatin 80 milliGRAM(s) Oral at bedtime  enoxaparin Injectable 40 milliGRAM(s) SubCutaneous every 24 hours  gabapentin 300 milliGRAM(s) Oral two times a day  insulin glargine Injectable (LANTUS) 13 Unit(s) SubCutaneous at bedtime  insulin lispro (ADMELOG) corrective regimen sliding scale   SubCutaneous three times a day before meals  insulin lispro (ADMELOG) corrective regimen sliding scale   SubCutaneous at bedtime  insulin lispro Injectable (ADMELOG) 2 Unit(s) SubCutaneous three times a day before meals  levETIRAcetam 500 milliGRAM(s) Oral two times a day  pantoprazole    Tablet 40 milliGRAM(s) Oral before breakfast    MEDICATIONS  (PRN):  acetaminophen     Tablet .. 650 milliGRAM(s) Oral every 6 hours PRN Temp greater or equal to 38C (100.4F), Mild Pain (1 - 3)  aluminum hydroxide/magnesium hydroxide/simethicone Suspension 30 milliLiter(s) Oral every 4 hours PRN Dyspepsia  melatonin 3 milliGRAM(s) Oral at bedtime PRN Insomnia  ondansetron Injectable 4 milliGRAM(s) IV Push every 8 hours PRN Nausea and/or Vomiting      PMHx/PSHx/FHx/SHx:  Convulsions    HTN (hypertension)    DM (diabetes mellitus)    Neuropathy    Duodenal cancer    Obstruction of biliary tree    Bipolar disorder    Seizure    Observed seizure-like activity    Leukocytosis    Duodenal cancer    Obstruction of biliary tree    HTN (hypertension)    DM (diabetes mellitus)    Hyperlipidemia    SYNCOPE    Head injury      Allergies:  Keflex (Urticaria)  penicillins (Unknown)      ROS: All systems negative except as documented in Interval history    O:  T(C): 37.2 (09-26-23 @ 06:33), Max: 37.2 (09-26-23 @ 06:33)  HR: 65 (09-26-23 @ 06:33) (64 - 74)  BP: 128/72 (09-26-23 @ 06:33) (113/54 - 159/79)  RR: 18 (09-26-23 @ 06:33) (17 - 18)  SpO2: 98% (09-25-23 @ 19:56) (98% - 98%)    Focused neurologic exam:  MS - AAO x3, speech fluent, rep/naming intact, follows commands, attn/conc/recent and remote memory/fund of knowledge WNL  CN - PERRLA, EOMI, VFF, face sens/str/hearing WNL b/l, tongue/palate midline, trap 5/5 b/l  Motor - Normal bulk/tone, 5/5 all  Sens - LT/temp/vib intact all  DTR's - 2+ all and downgoing b/l plantar response  Coord - FtN intact b/l, intention tremors L >R with postural components  Gait and station - Not assessed due to fall risk    Pertinent labs/studies:    LABS:  09-25 @ 02:21 Creatine 18 U/L<L> [25 - 170]  cret                        10.6   11.83 )-----------( 286      ( 26 Sep 2023 11:10 )             31.7     09-26    133<L>  |  96  |  37<H>  ----------------------------<  165<H>  3.9   |  22  |  1.93<H>    Ca    9.6      26 Sep 2023 11:10  Phos  4.2     09-26  Mg     2.0     09-26    TPro  7.6  /  Alb  3.2<L>  /  TBili  1.3<H>  /  DBili  x   /  AST  49<H>  /  ALT  46<H>  /  AlkPhos  450<H>  09-25    PT/INR - ( 25 Sep 2023 00:22 )   PT: 13.1 sec;   INR: 1.26 ratio         PTT - ( 25 Sep 2023 00:22 )  PTT:26.8 sec    < from: CT Head No Cont (09.25.23 @ 01:05) >    FINDINGS:  CT head:    Mild to moderate cerebral atrophy and mild chronic microvascular ischemic   change. Gray-white differentiation is preserved. The ventricles and sulci   are normal in size and configuration. The basilar cisterns are clear. No   focal edema or acute mass effect.There is no intracranial fluid   collection or acute hemorrhage.    There is no fracture identified. Opacified right frontal sinus. Scalp and   imaged facial soft tissues are within normal limits.    CT cervical spine:    Spinal alignment is anatomic. Craniocervical junction is normal. No acute   fracture. Vertebral body heights are maintained. Normal bone   mineralization. Disc osteophyte complex at C5-6 results in narrowing of   the central canal to 8 mm..    No gross upper cervical canal hematoma or mass. Cervical soft tissues are   unremarkable.      IMPRESSION:    1. No acute intracranial CT abnormality.  2. No acute fracture or traumatic malalignment in the cervical spine.    < end of copied text >   NEUROLOGY FOLLOW-UP CONSULT NOTE    RFC: RUE shaking    Interval history: No acute neurologic events overnight. [place]  assisted ID #[id number], however patient prefers her son to translate. Patient reports that her RUE intermittent tremor is chronic and she has followed up with a neurologist at [facility].    Meds:  MEDICATIONS  (STANDING):  atorvastatin 80 milliGRAM(s) Oral at bedtime  enoxaparin Injectable 40 milliGRAM(s) SubCutaneous every 24 hours  gabapentin 300 milliGRAM(s) Oral two times a day  insulin glargine Injectable (LANTUS) 13 Unit(s) SubCutaneous at bedtime  insulin lispro (ADMELOG) corrective regimen sliding scale   SubCutaneous three times a day before meals  insulin lispro (ADMELOG) corrective regimen sliding scale   SubCutaneous at bedtime  insulin lispro Injectable (ADMELOG) 2 Unit(s) SubCutaneous three times a day before meals  levETIRAcetam 500 milliGRAM(s) Oral two times a day  pantoprazole    Tablet 40 milliGRAM(s) Oral before breakfast    MEDICATIONS  (PRN):  acetaminophen     Tablet .. 650 milliGRAM(s) Oral every 6 hours PRN Temp greater or equal to 38C (100.4F), Mild Pain (1 - 3)  aluminum hydroxide/magnesium hydroxide/simethicone Suspension 30 milliLiter(s) Oral every 4 hours PRN Dyspepsia  melatonin 3 milliGRAM(s) Oral at bedtime PRN Insomnia  ondansetron Injectable 4 milliGRAM(s) IV Push every 8 hours PRN Nausea and/or Vomiting      PMHx/PSHx/FHx/SHx:  Convulsions    HTN (hypertension)    DM (diabetes mellitus)    Neuropathy    Duodenal cancer    Obstruction of biliary tree    Bipolar disorder    Seizure    Observed seizure-like activity    Leukocytosis    Duodenal cancer    Obstruction of biliary tree    HTN (hypertension)    DM (diabetes mellitus)    Hyperlipidemia    SYNCOPE    Head injury      Allergies:  Keflex (Urticaria)  penicillins (Unknown)      ROS: All systems negative except as documented in Interval history    O:  T(C): 37.2 (09-26-23 @ 06:33), Max: 37.2 (09-26-23 @ 06:33)  HR: 65 (09-26-23 @ 06:33) (64 - 74)  BP: 128/72 (09-26-23 @ 06:33) (113/54 - 159/79)  RR: 18 (09-26-23 @ 06:33) (17 - 18)  SpO2: 98% (09-25-23 @ 19:56) (98% - 98%)    Focused neurologic exam:  MS - AAO x3, speech fluent, rep/naming intact, follows commands, attn/conc/recent and remote memory/fund of knowledge WNL  CN - PERRLA, EOMI, VFF, face sens/str/hearing WNL b/l, tongue/palate midline, trap 5/5 b/l  Motor - Normal bulk/tone, 5/5 all  Sens - LT/temp/vib intact all  DTR's - 2+ all and downgoing b/l plantar response  Coord - FtN intact b/l, intention tremors L >R with postural components  Gait and station - Not assessed due to fall risk    Pertinent labs/studies:    LABS:  09-25 @ 02:21 Creatine 18 U/L<L> [25 - 170]  cret                        10.6   11.83 )-----------( 286      ( 26 Sep 2023 11:10 )             31.7     09-26    133<L>  |  96  |  37<H>  ----------------------------<  165<H>  3.9   |  22  |  1.93<H>    Ca    9.6      26 Sep 2023 11:10  Phos  4.2     09-26  Mg     2.0     09-26    TPro  7.6  /  Alb  3.2<L>  /  TBili  1.3<H>  /  DBili  x   /  AST  49<H>  /  ALT  46<H>  /  AlkPhos  450<H>  09-25    PT/INR - ( 25 Sep 2023 00:22 )   PT: 13.1 sec;   INR: 1.26 ratio         PTT - ( 25 Sep 2023 00:22 )  PTT:26.8 sec    < from: CT Head No Cont (09.25.23 @ 01:05) >    FINDINGS:  CT head:    Mild to moderate cerebral atrophy and mild chronic microvascular ischemic   change. Gray-white differentiation is preserved. The ventricles and sulci   are normal in size and configuration. The basilar cisterns are clear. No   focal edema or acute mass effect.There is no intracranial fluid   collection or acute hemorrhage.    There is no fracture identified. Opacified right frontal sinus. Scalp and   imaged facial soft tissues are within normal limits.    CT cervical spine:    Spinal alignment is anatomic. Craniocervical junction is normal. No acute   fracture. Vertebral body heights are maintained. Normal bone   mineralization. Disc osteophyte complex at C5-6 results in narrowing of   the central canal to 8 mm..    No gross upper cervical canal hematoma or mass. Cervical soft tissues are   unremarkable.      IMPRESSION:    1. No acute intracranial CT abnormality.  2. No acute fracture or traumatic malalignment in the cervical spine.    < end of copied text >

## 2023-09-26 NOTE — PROGRESS NOTE ADULT - SUBJECTIVE AND OBJECTIVE BOX
Lynsey Brice MD  PGY1  Preferred contact via [employer] Teams      Patient is a 72y old  Female who presents with a chief complaint of Fall, c/f seizures (25 Sep 2023 07:25)      SUBJECTIVE / OVERNIGHT EVENTS: No ON events    REVIEW OF SYSTEMS:    CONSTITUTIONAL: No weakness, fevers or chills  EYES/ENT: No visual changes;  No vertigo or throat pain   NECK: No pain or stiffness  RESPIRATORY: No cough, wheezing, hemoptysis; No shortness of breath  CARDIOVASCULAR: No chest pain or palpitations  GASTROINTESTINAL: No abdominal or epigastric pain. No nausea, vomiting, or hematemesis; No diarrhea or constipation. No melena or hematochezia.  GENITOURINARY: No dysuria, frequency or hematuria  NEUROLOGICAL: No numbness or weakness  SKIN: No itching, rashes      MEDICATIONS  (STANDING):  atorvastatin 80 milliGRAM(s) Oral at bedtime  enoxaparin Injectable 40 milliGRAM(s) SubCutaneous every 24 hours  gabapentin 300 milliGRAM(s) Oral two times a day  insulin glargine Injectable (LANTUS) 13 Unit(s) SubCutaneous at bedtime  insulin lispro (ADMELOG) corrective regimen sliding scale   SubCutaneous three times a day before meals  insulin lispro (ADMELOG) corrective regimen sliding scale   SubCutaneous at bedtime  insulin lispro Injectable (ADMELOG) 2 Unit(s) SubCutaneous three times a day before meals  levETIRAcetam 500 milliGRAM(s) Oral two times a day  pantoprazole    Tablet 40 milliGRAM(s) Oral before breakfast    MEDICATIONS  (PRN):  acetaminophen     Tablet .. 650 milliGRAM(s) Oral every 6 hours PRN Temp greater or equal to 38C (100.4F), Mild Pain (1 - 3)  aluminum hydroxide/magnesium hydroxide/simethicone Suspension 30 milliLiter(s) Oral every 4 hours PRN Dyspepsia  melatonin 3 milliGRAM(s) Oral at bedtime PRN Insomnia  ondansetron Injectable 4 milliGRAM(s) IV Push every 8 hours PRN Nausea and/or Vomiting      CAPILLARY BLOOD GLUCOSE      POCT Blood Glucose.: 195 mg/dL (25 Sep 2023 21:46)  POCT Blood Glucose.: 228 mg/dL (25 Sep 2023 17:56)    I&O's Summary    25 Sep 2023 07:01  -  26 Sep 2023 07:00  --------------------------------------------------------  IN: 0 mL / OUT: 760 mL / NET: -760 mL        PHYSICAL EXAM:    VITALS:   T(C): 37.2 (09-26-23 @ 06:33), Max: 37.2 (09-26-23 @ 06:33)  HR: 65 (09-26-23 @ 06:33) (64 - 74)  BP: 128/72 (09-26-23 @ 06:33) (113/54 - 159/79)  RR: 18 (09-26-23 @ 06:33) (17 - 18)  SpO2: 98% (09-25-23 @ 19:56) (98% - 98%)    GENERAL: NAD, lying in bed comfortably  HEAD:  Atraumatic, normocephalic  EYES: EOMI, PERRLA, conjunctiva and sclera clear  ENT: Moist mucous membranes  NECK: Supple, no JVD  HEART: Regular rate and rhythm, no murmurs, rubs, or gallops  LUNGS: Unlabored respirations.  Clear to auscultation bilaterally, no crackles, wheezing, or rhonchi  ABDOMEN: Soft, nontender, nondistended, +BS  EXTREMITIES: 2+ peripheral pulses bilaterally. No clubbing, cyanosis, or edema  NERVOUS SYSTEM:  A&Ox3, no focal deficits   SKIN: No rashes or lesions      LABS:                        10.5   11.63 )-----------( 313      ( 25 Sep 2023 00:22 )             31.3      09-25    132<L>  |  95<L>  |  34<H>  ----------------------------<  189<H>  4.3   |  21<L>  |  1.64<H>    Ca    9.4      25 Sep 2023 00:22    TPro  7.6  /  Alb  3.2<L>  /  TBili  1.3<H>  /  DBili  x   /  AST  49<H>  /  ALT  46<H>  /  AlkPhos  450<H>  09-25    PT/INR - ( 25 Sep 2023 00:22 )   PT: 13.1 sec;   INR: 1.26 ratio         PTT - ( 25 Sep 2023 00:22 )  PTT:26.8 sec  CARDIAC MARKERS ( 25 Sep 2023 02:21 )  x     / x     / 18 U/L / x     / x          Urinalysis Basic - ( 25 Sep 2023 00:22 )    Color: x / Appearance: x / SG: x / pH: x  Gluc: 189 mg/dL / Ketone: x  / Bili: x / Urobili: x   Blood: x / Protein: x / Nitrite: x   Leuk Esterase: x / RBC: x / WBC x   Sq Epi: x / Non Sq Epi: x / Bacteria: x        RADIOLOGY & ADDITIONAL TESTS:    < from: CT Abdomen and Pelvis w/ IV Cont (09.25.23 @ 01:09) >    FINDINGS:  CHEST:  LUNGS AND LARGE AIRWAYS: No pulmonary contusion or laceration. No   suspicious pulmonary mass.  PLEURA: No effusion or pneumothorax.  VESSELS: Pulmonary arteries are opacified to the proximal segmental   level, no acute thromboembolus. Main pulmonary artery is normal in   caliber. No thoracic aortic aneurysm or dissection. Coronary artery   calcifications.  HEART: Cardiomegaly. No pericardial effusion.  MEDIASTINUM AND FLORENCIO: No mediastinal hemorrhage or adenopathy  CHEST WALL AND LOWER NECK: Unremarkable    ABDOMEN AND PELVIS:  LIVER: Enlarged  BILE DUCTS: Percutaneous transhepatic biliary drain terminates in the   region of the ampulla. Moderate intrahepatic biliary ductal dilatation.   Cystic duct is dilated. Common duct is dilated up to 1.4 cm.    GALLBLADDER: Cholelithiasis.  SPLEEN: Within normal limits.  PANCREAS: Fatty infiltration of the pancreatic head  ADRENALS: Within normal limits  KIDNEYS/URETERS: Within normal limits.    BLADDER: Within normal limits.  REPRODUCTIVE ORGANS: Unremarkable CT appearance for age    BOWEL: Stent extends from the gastric antrum to the third portion of the   duodenum. No abnormal gastric distention. No bowel obstruction.. Appendix   is not visualized, no secondary findings of appendicitis.  PERITONEUM: No free air or free fluid  VESSELS: Atherosclerotic changes.  RETROPERITONEUM/LYMPH NODES: No enlarged lymph node measuring greater   than 10 mm in short axis  ABDOMINAL WALL: Small fat-containing periumbilical hernia  BONES: No destructive osseous lesion. No acute fracture.    IMPRESSION:    1. No acute posttraumatic finding in the chest, abdomen and pelvis.    < end of copied text >    < from: CT Head No Cont (09.25.23 @ 01:05) >    IMPRESSION:    1. No acute intracranial CT abnormality.  2. No acute fracture or traumatic malalignment in the cervical spine.    < end of copied text >

## 2023-09-26 NOTE — PROGRESS NOTE ADULT - ASSESSMENT
72-year-old woman DNR/DNI with neoplastic duodenal cancer, esophageal neoplasm, T2DM, bipolar disorder, hypertension, bile duct obstructions w/ biliary drain in place, presenting s/p fall from wheelchair onto bathroom floor, striking forehead, at [facility]; also with seizure-like activity in ED w/ RUE shaking and contractions. Neuro consulted, likely seizure due to head strike. Pt afebrile, normotensive and reassuring imaging studies, however lab work significant for leukocytosis to 11.63, lactate 2.1. Infectious w/u pending. W/u also ongoing to r/o other causes of syncope, as pt does not recall her fall.

## 2023-09-26 NOTE — PROGRESS NOTE ADULT - ASSESSMENT
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 + cp ten hours prior. Neurology consulted c/f seizures. Patient was loaded with keppra. CT head negative.    IMPRESSION   RUE shaking, positional dizziness and syncope likely related to vasovagal syncope with possible components of orthostatic hypotension. B/L UE tremors likely essential tremors ( Patient has been followed by a neurologist outpatient)    RECOMMENDATION    [] discontinue keppra given symptoms resolved and RUE tremors/shaking is chronic per patient  [] rEEG to evaluate for focal slowing, epileptiform discharges, or seizures  [] please check orthostatic BP, and metabolic labs: TSH, free T4, Vitamin B1, B6, B12, D 25-hydroxy, folate, copper  [] rest of care per primary team  [] will continue to follow up      Plans discussed with neurology attending, Dr. Hendrix

## 2023-09-27 LAB
24R-OH-CALCIDIOL SERPL-MCNC: 42.1 NG/ML — SIGNIFICANT CHANGE UP (ref 30–80)
CULTURE RESULTS: SIGNIFICANT CHANGE UP
GLUCOSE BLDC GLUCOMTR-MCNC: 172 MG/DL — HIGH (ref 70–99)
GLUCOSE BLDC GLUCOMTR-MCNC: 187 MG/DL — HIGH (ref 70–99)
GLUCOSE BLDC GLUCOMTR-MCNC: 201 MG/DL — HIGH (ref 70–99)
GLUCOSE BLDC GLUCOMTR-MCNC: 202 MG/DL — HIGH (ref 70–99)
SPECIMEN SOURCE: SIGNIFICANT CHANGE UP
VIT D25+D1,25 OH+D1,25 PNL SERPL-MCNC: 34.4 PG/ML — SIGNIFICANT CHANGE UP (ref 19.9–79.3)

## 2023-09-27 PROCEDURE — 99233 SBSQ HOSP IP/OBS HIGH 50: CPT

## 2023-09-27 PROCEDURE — 99232 SBSQ HOSP IP/OBS MODERATE 35: CPT | Mod: GC

## 2023-09-27 PROCEDURE — 95816 EEG AWAKE AND DROWSY: CPT | Mod: 26

## 2023-09-27 RX ORDER — INFLUENZA VIRUS VACCINE 15; 15; 15; 15 UG/.5ML; UG/.5ML; UG/.5ML; UG/.5ML
0.7 SUSPENSION INTRAMUSCULAR ONCE
Refills: 0 | Status: DISCONTINUED | OUTPATIENT
Start: 2023-09-27 | End: 2023-09-29

## 2023-09-27 RX ADMIN — ENOXAPARIN SODIUM 40 MILLIGRAM(S): 100 INJECTION SUBCUTANEOUS at 17:57

## 2023-09-27 RX ADMIN — GABAPENTIN 300 MILLIGRAM(S): 400 CAPSULE ORAL at 05:54

## 2023-09-27 RX ADMIN — CHLORHEXIDINE GLUCONATE 1 APPLICATION(S): 213 SOLUTION TOPICAL at 05:54

## 2023-09-27 RX ADMIN — Medication 2: at 17:58

## 2023-09-27 RX ADMIN — Medication 2 UNIT(S): at 17:58

## 2023-09-27 RX ADMIN — ATORVASTATIN CALCIUM 80 MILLIGRAM(S): 80 TABLET, FILM COATED ORAL at 22:05

## 2023-09-27 RX ADMIN — INSULIN GLARGINE 13 UNIT(S): 100 INJECTION, SOLUTION SUBCUTANEOUS at 22:05

## 2023-09-27 RX ADMIN — Medication 2: at 12:58

## 2023-09-27 RX ADMIN — GABAPENTIN 300 MILLIGRAM(S): 400 CAPSULE ORAL at 18:01

## 2023-09-27 RX ADMIN — Medication 1: at 08:44

## 2023-09-27 RX ADMIN — Medication 3 MILLIGRAM(S): at 22:05

## 2023-09-27 RX ADMIN — Medication 2 UNIT(S): at 08:45

## 2023-09-27 RX ADMIN — Medication 2 UNIT(S): at 12:58

## 2023-09-27 NOTE — PROGRESS NOTE ADULT - SUBJECTIVE AND OBJECTIVE BOX
Patient is a 72y old  Female who presents with a chief complaint of Fall, c/f seizures (26 Sep 2023 11:50)      SUBJECTIVE / OVERNIGHT EVENTS:  No acute events overnight. Patient at this time denies fevers, chills, CP, SOB, nausea, vomiting, diarrhea, constipation, dysuria. Patient seen and examined at bedside.    MEDICATIONS  (STANDING):  atorvastatin 80 milliGRAM(s) Oral at bedtime  chlorhexidine 4% Liquid 1 Application(s) Topical <User Schedule>  enoxaparin Injectable 40 milliGRAM(s) SubCutaneous every 24 hours  gabapentin 300 milliGRAM(s) Oral two times a day  influenza  Vaccine (HIGH DOSE) 0.7 milliLiter(s) IntraMuscular once  insulin glargine Injectable (LANTUS) 13 Unit(s) SubCutaneous at bedtime  insulin lispro (ADMELOG) corrective regimen sliding scale   SubCutaneous three times a day before meals  insulin lispro (ADMELOG) corrective regimen sliding scale   SubCutaneous at bedtime  insulin lispro Injectable (ADMELOG) 2 Unit(s) SubCutaneous three times a day before meals  pantoprazole    Tablet 40 milliGRAM(s) Oral before breakfast    MEDICATIONS  (PRN):  acetaminophen     Tablet .. 650 milliGRAM(s) Oral every 6 hours PRN Temp greater or equal to 38C (100.4F), Mild Pain (1 - 3)  aluminum hydroxide/magnesium hydroxide/simethicone Suspension 30 milliLiter(s) Oral every 4 hours PRN Dyspepsia  melatonin 3 milliGRAM(s) Oral at bedtime PRN Insomnia  ondansetron Injectable 4 milliGRAM(s) IV Push every 8 hours PRN Nausea and/or Vomiting      CAPILLARY BLOOD GLUCOSE      POCT Blood Glucose.: 172 mg/dL (26 Sep 2023 21:46)  POCT Blood Glucose.: 198 mg/dL (26 Sep 2023 17:36)  POCT Blood Glucose.: 203 mg/dL (26 Sep 2023 12:56)  POCT Blood Glucose.: 165 mg/dL (26 Sep 2023 09:10)    I&O's Summary    26 Sep 2023 07:01  -  27 Sep 2023 07:00  --------------------------------------------------------  IN: 820 mL / OUT: 1925 mL / NET: -1105 mL        PHYSICAL EXAM:  Vital Signs Last 24 Hrs  T(C): 36.8 (27 Sep 2023 04:16), Max: 36.8 (26 Sep 2023 21:01)  T(F): 98.3 (27 Sep 2023 04:16), Max: 98.3 (27 Sep 2023 04:16)  HR: 69 (27 Sep 2023 04:16) (69 - 71)  BP: 125/70 (27 Sep 2023 04:16) (125/70 - 154/72)  BP(mean): --  RR: 18 (27 Sep 2023 04:16) (18 - 18)  SpO2: 95% (27 Sep 2023 04:16) (95% - 98%)    Parameters below as of 27 Sep 2023 04:16  Patient On (Oxygen Delivery Method): room air        GENERAL: No acute distress, well-developed  HEAD:  Atraumatic, Normocephalic  EYES: EOMI, PERRLA, conjunctiva and sclera clear  NECK: Supple, no lymphadenopathy, no JVD  CHEST/LUNG: CTAB; No wheezes, rales, or rhonchi  HEART: Regular rate and rhythm; No murmurs, rubs, or gallops  ABDOMEN: Soft, non-tender, non-distended; normal bowel sounds, no organomegaly  EXTREMITIES:  2+ peripheral pulses b/l, No clubbing, cyanosis, or edema  NEUROLOGY: A&O x 3, no focal deficits  SKIN: No rashes or lesions    LABS:                        10.6   11.83 )-----------( 286      ( 26 Sep 2023 11:10 )             31.7     09-26    133<L>  |  96  |  37<H>  ----------------------------<  165<H>  3.9   |  22  |  1.93<H>    Ca    9.6      26 Sep 2023 11:10  Phos  4.2     09-26  Mg     2.0     09-26            Urinalysis Basic - ( 26 Sep 2023 11:10 )    Color: x / Appearance: x / SG: x / pH: x  Gluc: 165 mg/dL / Ketone: x  / Bili: x / Urobili: x   Blood: x / Protein: x / Nitrite: x   Leuk Esterase: x / RBC: x / WBC x   Sq Epi: x / Non Sq Epi: x / Bacteria: x        Culture - Blood (collected 25 Sep 2023 22:32)  Source: .Blood Blood-Peripheral  Preliminary Report (27 Sep 2023 02:02):    No growth at 24 hours    Culture - Blood (collected 25 Sep 2023 12:47)  Source: .Blood Blood-Peripheral  Preliminary Report (26 Sep 2023 17:02):    No growth at 24 hours        RADIOLOGY & ADDITIONAL TESTS:  Results Reviewed:   Imaging Personally Reviewed:  Electrocardiogram Personally Reviewed:    COORDINATION OF CARE:  Care Discussed with Consultants/Other Providers [Y/N]:  Prior or Outpatient Records Reviewed [Y/N]:   Patient is a 72y old  Female who presents with a chief complaint of Fall, c/f seizures (26 Sep 2023 11:50)      SUBJECTIVE / OVERNIGHT EVENTS:  No acute events overnight. At this time has a cough, states this has been since her fall. Patient at this time denies fevers, chills, CP, SOB, nausea, vomiting, diarrhea, constipation, dysuria. Patient seen and examined at bedside.    MEDICATIONS  (STANDING):  atorvastatin 80 milliGRAM(s) Oral at bedtime  chlorhexidine 4% Liquid 1 Application(s) Topical <User Schedule>  enoxaparin Injectable 40 milliGRAM(s) SubCutaneous every 24 hours  gabapentin 300 milliGRAM(s) Oral two times a day  influenza  Vaccine (HIGH DOSE) 0.7 milliLiter(s) IntraMuscular once  insulin glargine Injectable (LANTUS) 13 Unit(s) SubCutaneous at bedtime  insulin lispro (ADMELOG) corrective regimen sliding scale   SubCutaneous three times a day before meals  insulin lispro (ADMELOG) corrective regimen sliding scale   SubCutaneous at bedtime  insulin lispro Injectable (ADMELOG) 2 Unit(s) SubCutaneous three times a day before meals  pantoprazole    Tablet 40 milliGRAM(s) Oral before breakfast    MEDICATIONS  (PRN):  acetaminophen     Tablet .. 650 milliGRAM(s) Oral every 6 hours PRN Temp greater or equal to 38C (100.4F), Mild Pain (1 - 3)  aluminum hydroxide/magnesium hydroxide/simethicone Suspension 30 milliLiter(s) Oral every 4 hours PRN Dyspepsia  melatonin 3 milliGRAM(s) Oral at bedtime PRN Insomnia  ondansetron Injectable 4 milliGRAM(s) IV Push every 8 hours PRN Nausea and/or Vomiting      CAPILLARY BLOOD GLUCOSE      POCT Blood Glucose.: 172 mg/dL (26 Sep 2023 21:46)  POCT Blood Glucose.: 198 mg/dL (26 Sep 2023 17:36)  POCT Blood Glucose.: 203 mg/dL (26 Sep 2023 12:56)  POCT Blood Glucose.: 165 mg/dL (26 Sep 2023 09:10)    I&O's Summary    26 Sep 2023 07:01  -  27 Sep 2023 07:00  --------------------------------------------------------  IN: 820 mL / OUT: 1925 mL / NET: -1105 mL        PHYSICAL EXAM:  Vital Signs Last 24 Hrs  T(C): 36.8 (27 Sep 2023 04:16), Max: 36.8 (26 Sep 2023 21:01)  T(F): 98.3 (27 Sep 2023 04:16), Max: 98.3 (27 Sep 2023 04:16)  HR: 69 (27 Sep 2023 04:16) (69 - 71)  BP: 125/70 (27 Sep 2023 04:16) (125/70 - 154/72)  BP(mean): --  RR: 18 (27 Sep 2023 04:16) (18 - 18)  SpO2: 95% (27 Sep 2023 04:16) (95% - 98%)    Parameters below as of 27 Sep 2023 04:16  Patient On (Oxygen Delivery Method): room air        GENERAL: No acute distress, well-developed  HEAD:  Atraumatic, Normocephalic  EYES: EOMI, PERRLA, conjunctiva and sclera clear  NECK: Supple, no lymphadenopathy, no JVD  CHEST/LUNG: CTAB; No wheezes, rales, or rhonchi  HEART: Regular rate and rhythm; No murmurs, rubs, or gallops  ABDOMEN: Soft, non-tender, non-distended; normal bowel sounds, no organomegaly  EXTREMITIES:  2+ peripheral pulses b/l, No clubbing, cyanosis, or edema  NEUROLOGY: A&O x 3, no focal deficits  SKIN: No rashes or lesions    LABS:                        10.6   11.83 )-----------( 286      ( 26 Sep 2023 11:10 )             31.7     09-26    133<L>  |  96  |  37<H>  ----------------------------<  165<H>  3.9   |  22  |  1.93<H>    Ca    9.6      26 Sep 2023 11:10  Phos  4.2     09-26  Mg     2.0     09-26            Urinalysis Basic - ( 26 Sep 2023 11:10 )    Color: x / Appearance: x / SG: x / pH: x  Gluc: 165 mg/dL / Ketone: x  / Bili: x / Urobili: x   Blood: x / Protein: x / Nitrite: x   Leuk Esterase: x / RBC: x / WBC x   Sq Epi: x / Non Sq Epi: x / Bacteria: x        Culture - Blood (collected 25 Sep 2023 22:32)  Source: .Blood Blood-Peripheral  Preliminary Report (27 Sep 2023 02:02):    No growth at 24 hours    Culture - Blood (collected 25 Sep 2023 12:47)  Source: .Blood Blood-Peripheral  Preliminary Report (26 Sep 2023 17:02):    No growth at 24 hours        RADIOLOGY & ADDITIONAL TESTS:  Results Reviewed:   Imaging Personally Reviewed:  Electrocardiogram Personally Reviewed:    COORDINATION OF CARE:  Care Discussed with Consultants/Other Providers [Y/N]:  Prior or Outpatient Records Reviewed [Y/N]:

## 2023-09-27 NOTE — PROGRESS NOTE ADULT - PROBLEM SELECTOR PLAN 1
- Pt w/ <5 min of RUE contraction and shaking in ED with unresponsiveness; no seizure history in past, but hit head on bathroom floor at rehab facility   - Keppra loaded (1g) following which pt returned to baseline  - Per neuro, spot EEG and felt presentation was likely 2/2 seizure-activity due to fall and hitting head  - Keppra d/c given symptoms resolved and RUE tremors are chronic, per patient  - rEEG  - fall likely i/s/o deconditioning - Pt w/ <5 min of RUE contraction and shaking in ED with unresponsiveness; no seizure history in past, but hit head on bathroom floor at rehab facility   - Keppra loaded (1g) following which pt returned to baseline  - per neuro, RUE shaking, positional dizziness and syncope likely related to vasovagal syncope, b/l UE tremors likely essential tremors  - Keppra d/c given symptoms resolved and RUE tremors are chronic, per patient  - rEEG  - f/u orthostatic BP, and metabolic labs: TSH, free T4, Vitamin B1, B6, B12, D 25-hydroxy, folate, copper  - fall likely i/s/o deconditioning - Pt w/ <5 min of RUE contraction and shaking in ED with unresponsiveness; no seizure history in past, but hit head on bathroom floor at rehab facility   - Keppra loaded (1g) following which pt returned to baseline  - per neuro, RUE shaking, positional dizziness and syncope likely related to vasovagal syncope, b/l UE tremors likely essential tremors  - Keppra d/c given symptoms resolved and RUE tremors are chronic, per patient  - rEEG  - f/u orthostatic BP, and metabolic labs: TSH, free T4, Vitamin B1, B6, B12, D 25-hydroxy, folate, copper - negative thusfar  - fall likely i/s/o deconditioning - Pt w/ <5 min of RUE contraction and shaking in ED with unresponsiveness; no seizure history in past, but hit head on bathroom floor at rehab facility   - Keppra loaded (1g) following which pt returned to baseline  - per neuro, RUE shaking, positional dizziness and syncope likely related to vasovagal syncope, b/l UE tremors likely essential tremors  - Keppra d/c given symptoms resolved and RUE tremors are chronic, per patient  - rEEG  - f/u and metabolic labs: TSH, free T4, Vitamin B1, B6, B12, D 25-hydroxy, folate, copper - negative thusfar  - orthostatics (+) this AM

## 2023-09-27 NOTE — EEG REPORT - NS EEG TEXT BOX
MADELIN BOYLE [id number]     Study Date: 		09-27-23  Duration x Hours:          20 min  --------------------------------------------------------------------------------------------------  History:  CC/ HPI Patient is a 72y old  Female who presents with a chief complaint of Fall, c/f seizures (27 Sep 2023 11:18)    MEDICATIONS  (STANDING):  atorvastatin 80 milliGRAM(s) Oral at bedtime  chlorhexidine 4% Liquid 1 Application(s) Topical <User Schedule>  enoxaparin Injectable 40 milliGRAM(s) SubCutaneous every 24 hours  gabapentin 300 milliGRAM(s) Oral two times a day  influenza  Vaccine (HIGH DOSE) 0.7 milliLiter(s) IntraMuscular once    --------------------------------------------------------------------------------------------------  Study Interpretation:    [[[Abbreviation Key:  PDR=alpha rhythm/posterior dominant rhythm. A-P=anterior posterior.  Amplitude: ‘very low’:<20; ‘low’:20-49; ‘medium’:; ‘high’:>150uV.  Persistence for periodic/rhythmic patterns (% of epoch) ‘rare’:<1%; ‘occasional’:1-10%; ‘frequent’:10-50%; ‘abundant’:50-90%; ‘continuous’:>90%.  Persistence for sporadic discharges: ‘rare’:<1/hr; ‘occasional’:1/min-1/hr; ‘frequent’:>1/min; ‘abundant’:>1/10 sec.  RPP=rhythmic and periodic patterns; GRDA=generalized rhythmic delta activity; FIRDA=frontal intermittent GRDA; LRDA=lateralized rhythmic delta activity; TIRDA=temporal intermittent rhythmic delta activity;  LPD=PLED=lateralized periodic discharges; GPD=generalized periodic discharges; BIPDs =bilateral independent periodic discharges; Mf=multifocal; SIRPDs=stimulus induced rhythmic, periodic, or ictal appearing discharges; BIRDs=brief potentially ictal rhythmic discharges >4 Hz, lasting .5-10s; PFA (paroxysmal bursts >13 Hz or =8 Hz <10s).  Modifiers: +F=with fast component; +S=with spike component; +R=with rhythmic component.  S-B=burst suppression pattern.  Max=maximal. N1-drowsy; N2-stage II sleep; N3-slow wave sleep. SSS/BETS=small sharp spikes/benign epileptiform transients of sleep. HV=hyperventilation; PS=photic stimulation]]]    Daily EEG Visual Analysis    FINDINGS:      Background:  Continuity: continuous  Symmetry: symmetric  PDR: 8.5-9 Hz activity, with amplitude to 40 uV, that attenuated to eye opening.  Low amplitude frontal beta noted in wakefulness.  Reactivity: present  Voltage: normal, mostly 20-150uV  Anterior Posterior Gradient: present  Other background findings: none  Breach: absent    Background Slowing:  Generalized slowing: none was present.  Focal slowing: none was present.    State Changes:   -Drowsiness noted with increased slowing, attenuation of fast activity, vertex transients.  -Present with N2 sleep transients with symmetric spindles and K-complexes.      Sporadic Epileptiform Discharges:    None    Rhythmic and Periodic Patterns (RPPs):  None     Electrographic and Electroclinical seizures:  None    Other Clinical Events:  None    Activation Procedures:   -Hyperventilation was not performed.    -Photic stimulation was performed and did not elicit any abnormalities.      Artifacts:  Intermittent myogenic and movement artifacts were noted.    ECG:  The heart rate on single channel ECG was predominantly between 60-70 BPM.    EEG Classification / Summary:  Normal EEG in the awake, drowsy and asleep state(s).    Clinical Impression:  There were no epileptiform abnormalities recorded.      This is a preliminary fellow impression only pending attending review    Manish Petit MD - Epilepsy Fellow MADELIN BOYLE [id number]     Study Date: 		09-27-23  Duration x Hours:          20 min  --------------------------------------------------------------------------------------------------  History:  CC/ HPI Patient is a 72y old  Female who presents with a chief complaint of Fall, c/f seizures (27 Sep 2023 11:18)    MEDICATIONS  (STANDING):  atorvastatin 80 milliGRAM(s) Oral at bedtime  chlorhexidine 4% Liquid 1 Application(s) Topical <User Schedule>  enoxaparin Injectable 40 milliGRAM(s) SubCutaneous every 24 hours  gabapentin 300 milliGRAM(s) Oral two times a day  influenza  Vaccine (HIGH DOSE) 0.7 milliLiter(s) IntraMuscular once    --------------------------------------------------------------------------------------------------  Study Interpretation:    [[[Abbreviation Key:  PDR=alpha rhythm/posterior dominant rhythm. A-P=anterior posterior.  Amplitude: ‘very low’:<20; ‘low’:20-49; ‘medium’:; ‘high’:>150uV.  Persistence for periodic/rhythmic patterns (% of epoch) ‘rare’:<1%; ‘occasional’:1-10%; ‘frequent’:10-50%; ‘abundant’:50-90%; ‘continuous’:>90%.  Persistence for sporadic discharges: ‘rare’:<1/hr; ‘occasional’:1/min-1/hr; ‘frequent’:>1/min; ‘abundant’:>1/10 sec.  RPP=rhythmic and periodic patterns; GRDA=generalized rhythmic delta activity; FIRDA=frontal intermittent GRDA; LRDA=lateralized rhythmic delta activity; TIRDA=temporal intermittent rhythmic delta activity;  LPD=PLED=lateralized periodic discharges; GPD=generalized periodic discharges; BIPDs =bilateral independent periodic discharges; Mf=multifocal; SIRPDs=stimulus induced rhythmic, periodic, or ictal appearing discharges; BIRDs=brief potentially ictal rhythmic discharges >4 Hz, lasting .5-10s; PFA (paroxysmal bursts >13 Hz or =8 Hz <10s).  Modifiers: +F=with fast component; +S=with spike component; +R=with rhythmic component.  S-B=burst suppression pattern.  Max=maximal. N1-drowsy; N2-stage II sleep; N3-slow wave sleep. SSS/BETS=small sharp spikes/benign epileptiform transients of sleep. HV=hyperventilation; PS=photic stimulation]]]    Daily EEG Visual Analysis    FINDINGS:      Background:  Continuity: continuous  Symmetry: symmetric  PDR: 8.5-9 Hz activity, with amplitude to 40 uV, that attenuated to eye opening.  Low amplitude frontal beta noted in wakefulness.  Reactivity: present  Voltage: normal, mostly 20-150uV  Anterior Posterior Gradient: present  Other background findings: none  Breach: absent    Background Slowing:  Generalized slowing: none was present.  Focal slowing: none was present.    State Changes:   -Drowsiness noted with increased slowing, attenuation of fast activity, vertex transients.  -Present with N2 sleep transients with symmetric spindles and K-complexes.      Sporadic Epileptiform Discharges:    None    Rhythmic and Periodic Patterns (RPPs):  None     Electrographic and Electroclinical seizures:  None    Other Clinical Events:  None    Activation Procedures:   -Hyperventilation was not performed.    -Photic stimulation was performed and did not elicit any abnormalities.      Artifacts:  Intermittent myogenic and movement artifacts were noted.    ECG:  The heart rate on single channel ECG was predominantly between 60-70 BPM.    EEG Classification / Summary:  Normal EEG in the awake, drowsy and asleep state(s).    Clinical Impression:  There were no epileptiform abnormalities recorded.      Manish Petit MD - Epilepsy Fellow    Jabier West MD PhD  Director, Epilepsy Division, [facility] EEG Reading Room Ph#: [phone]  Epilepsy Answering Service after 5PM and before 8:30AM: Ph#: [phone]

## 2023-09-27 NOTE — PROGRESS NOTE ADULT - ASSESSMENT
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 + cp ten hours prior. Neurology consulted c/f seizures. Patient was loaded with keppra. CT head negative.    IMPRESSION   RUE shaking, positional dizziness and syncope likely related to vasovagal syncope with possible components of orthostatic hypotension. B/L UE tremors likely essential tremors ( Patient has been followed by a neurologist outpatient)    RECOMMENDATION    [] discontinue keppra given symptoms resolved and RUE tremors/shaking is chronic per patient  [] rEEG to evaluate for focal slowing, epileptiform discharges, or seizures  [] please check orthostatic BP, and metabolic labs: TSH (0.74), T4 (10.1), Vitamin B1, B6, B12 (777), D 1,25-hydroxy (34.4), please check vitamin D25-hydroxy level, folate (7.6), copper  [] rest of care per primary team  [] will continue to follow up      Plans discussed with neurology attending, Dr. Hendrix

## 2023-09-27 NOTE — PROGRESS NOTE ADULT - SUBJECTIVE AND OBJECTIVE BOX
NEUROLOGY FOLLOW-UP CONSULT NOTE    RFC: ANSON calhoun    Interval history: No acute neurologic events overnight. Patient declined translation service, states she prefers her daughter to translate. Discussed plans with patient and her daughter.     MEDICATIONS  (STANDING):  atorvastatin 80 milliGRAM(s) Oral at bedtime  chlorhexidine 4% Liquid 1 Application(s) Topical <User Schedule>  enoxaparin Injectable 40 milliGRAM(s) SubCutaneous every 24 hours  gabapentin 300 milliGRAM(s) Oral two times a day  influenza  Vaccine (HIGH DOSE) 0.7 milliLiter(s) IntraMuscular once  insulin glargine Injectable (LANTUS) 13 Unit(s) SubCutaneous at bedtime  insulin lispro (ADMELOG) corrective regimen sliding scale   SubCutaneous three times a day before meals  insulin lispro (ADMELOG) corrective regimen sliding scale   SubCutaneous at bedtime  insulin lispro Injectable (ADMELOG) 2 Unit(s) SubCutaneous three times a day before meals  pantoprazole    Tablet 40 milliGRAM(s) Oral before breakfast    MEDICATIONS  (PRN):  acetaminophen     Tablet .. 650 milliGRAM(s) Oral every 6 hours PRN Temp greater or equal to 38C (100.4F), Mild Pain (1 - 3)  aluminum hydroxide/magnesium hydroxide/simethicone Suspension 30 milliLiter(s) Oral every 4 hours PRN Dyspepsia  melatonin 3 milliGRAM(s) Oral at bedtime PRN Insomnia  ondansetron Injectable 4 milliGRAM(s) IV Push every 8 hours PRN Nausea and/or Vomiting        PMHx/PSHx/FHx/SHx:  Convulsions    HTN (hypertension)    DM (diabetes mellitus)    Neuropathy    Duodenal cancer    Obstruction of biliary tree    Bipolar disorder    Seizure    Observed seizure-like activity    Leukocytosis    Duodenal cancer    Obstruction of biliary tree    HTN (hypertension)    DM (diabetes mellitus)    Hyperlipidemia    SYNCOPE    Head injury      Allergies:  Keflex (Urticaria)  penicillins (Unknown)      ROS: All systems negative except as documented in Interval history    O:  T(C): 36.8 (09-27-23 @ 04:16), Max: 36.8 (09-26-23 @ 21:01)  T(F): 98.3 (09-27-23 @ 04:16), Max: 98.3 (09-27-23 @ 04:16)  HR: 69 (09-27-23 @ 04:16) (69 - 71)  BP: 125/70 (09-27-23 @ 04:16) (125/70 - 154/72)  RR: 18 (09-27-23 @ 04:16) (18 - 18)  SpO2: 95% (09-27-23 @ 04:16) (95% - 98%)  Wt(kg): --    Focused neurologic exam:  MS - AAO x3, speech fluent, rep/naming intact, follows commands, attn/conc/recent and remote memory/fund of knowledge WNL  CN - PERRLA, EOMI, VFF, face sens/str/hearing WNL b/l, tongue/palate midline, trap 5/5 b/l  Motor - Normal bulk/tone, 5/5 all  Sens - LT/temp/vib intact all  DTR's - 2+ all and downgoing b/l plantar response  Coord - FtN intact b/l, intention tremors L >R with postural components  Gait and station - Not assessed due to fall risk    Pertinent labs/studies:      LABS:  cret                        10.6   11.83 )-----------( 286      ( 26 Sep 2023 11:10 )             31.7     09-26    133<L>  |  96  |  37<H>  ----------------------------<  165<H>  3.9   |  22  |  1.93<H>    Ca    9.6      26 Sep 2023 11:10  Phos  4.2     09-26  Mg     2.0     09-26        < from: CT Head No Cont (09.25.23 @ 01:05) >    FINDINGS:  CT head:    Mild to moderate cerebral atrophy and mild chronic microvascular ischemic   change. Gray-white differentiation is preserved. The ventricles and sulci   are normal in size and configuration. The basilar cisterns are clear. No   focal edema or acute mass effect.There is no intracranial fluid   collection or acute hemorrhage.    There is no fracture identified. Opacified right frontal sinus. Scalp and   imaged facial soft tissues are within normal limits.    CT cervical spine:    Spinal alignment is anatomic. Craniocervical junction is normal. No acute   fracture. Vertebral body heights are maintained. Normal bone   mineralization. Disc osteophyte complex at C5-6 results in narrowing of   the central canal to 8 mm..    No gross upper cervical canal hematoma or mass. Cervical soft tissues are   unremarkable.      IMPRESSION:    1. No acute intracranial CT abnormality.  2. No acute fracture or traumatic malalignment in the cervical spine.    < end of copied text >

## 2023-09-28 ENCOUNTER — TRANSCRIPTION ENCOUNTER (OUTPATIENT)
Age: 73
End: 2023-09-28

## 2023-09-28 DIAGNOSIS — R56.9 UNSPECIFIED CONVULSIONS: ICD-10-CM

## 2023-09-28 LAB
24R-OH-CALCIDIOL SERPL-MCNC: 46.2 NG/ML — SIGNIFICANT CHANGE UP (ref 30–80)
ALBUMIN SERPL ELPH-MCNC: 3.2 G/DL — LOW (ref 3.3–5)
ALP SERPL-CCNC: 435 U/L — HIGH (ref 40–120)
ALT FLD-CCNC: 30 U/L — SIGNIFICANT CHANGE UP (ref 10–45)
ANION GAP SERPL CALC-SCNC: 15 MMOL/L — SIGNIFICANT CHANGE UP (ref 5–17)
ANION GAP SERPL CALC-SCNC: 16 MMOL/L — SIGNIFICANT CHANGE UP (ref 5–17)
AST SERPL-CCNC: 25 U/L — SIGNIFICANT CHANGE UP (ref 10–40)
BASOPHILS # BLD AUTO: 0.07 K/UL — SIGNIFICANT CHANGE UP (ref 0–0.2)
BASOPHILS NFR BLD AUTO: 0.7 % — SIGNIFICANT CHANGE UP (ref 0–2)
BILIRUB SERPL-MCNC: 0.9 MG/DL — SIGNIFICANT CHANGE UP (ref 0.2–1.2)
BUN SERPL-MCNC: 34 MG/DL — HIGH (ref 7–23)
BUN SERPL-MCNC: 36 MG/DL — HIGH (ref 7–23)
CALCIUM SERPL-MCNC: 9.3 MG/DL — SIGNIFICANT CHANGE UP (ref 8.4–10.5)
CALCIUM SERPL-MCNC: 9.3 MG/DL — SIGNIFICANT CHANGE UP (ref 8.4–10.5)
CHLORIDE SERPL-SCNC: 100 MMOL/L — SIGNIFICANT CHANGE UP (ref 96–108)
CHLORIDE SERPL-SCNC: 97 MMOL/L — SIGNIFICANT CHANGE UP (ref 96–108)
CO2 SERPL-SCNC: 19 MMOL/L — LOW (ref 22–31)
CO2 SERPL-SCNC: 20 MMOL/L — LOW (ref 22–31)
CREAT ?TM UR-MCNC: 98 MG/DL — SIGNIFICANT CHANGE UP
CREAT SERPL-MCNC: 2.06 MG/DL — HIGH (ref 0.5–1.3)
CREAT SERPL-MCNC: 2.16 MG/DL — HIGH (ref 0.5–1.3)
EGFR: 24 ML/MIN/1.73M2 — LOW
EGFR: 25 ML/MIN/1.73M2 — LOW
EOSINOPHIL # BLD AUTO: 0.46 K/UL — SIGNIFICANT CHANGE UP (ref 0–0.5)
EOSINOPHIL NFR BLD AUTO: 4.7 % — SIGNIFICANT CHANGE UP (ref 0–6)
GLUCOSE BLDC GLUCOMTR-MCNC: 176 MG/DL — HIGH (ref 70–99)
GLUCOSE BLDC GLUCOMTR-MCNC: 233 MG/DL — HIGH (ref 70–99)
GLUCOSE BLDC GLUCOMTR-MCNC: 240 MG/DL — HIGH (ref 70–99)
GLUCOSE BLDC GLUCOMTR-MCNC: 264 MG/DL — HIGH (ref 70–99)
GLUCOSE SERPL-MCNC: 216 MG/DL — HIGH (ref 70–99)
GLUCOSE SERPL-MCNC: 230 MG/DL — HIGH (ref 70–99)
HCT VFR BLD CALC: 31.8 % — LOW (ref 34.5–45)
HGB BLD-MCNC: 10.4 G/DL — LOW (ref 11.5–15.5)
IMM GRANULOCYTES NFR BLD AUTO: 1.1 % — HIGH (ref 0–0.9)
LYMPHOCYTES # BLD AUTO: 0.63 K/UL — LOW (ref 1–3.3)
LYMPHOCYTES # BLD AUTO: 6.4 % — LOW (ref 13–44)
MAGNESIUM SERPL-MCNC: 2 MG/DL — SIGNIFICANT CHANGE UP (ref 1.6–2.6)
MCHC RBC-ENTMCNC: 28.3 PG — SIGNIFICANT CHANGE UP (ref 27–34)
MCHC RBC-ENTMCNC: 32.7 GM/DL — SIGNIFICANT CHANGE UP (ref 32–36)
MCV RBC AUTO: 86.6 FL — SIGNIFICANT CHANGE UP (ref 80–100)
MONOCYTES # BLD AUTO: 0.75 K/UL — SIGNIFICANT CHANGE UP (ref 0–0.9)
MONOCYTES NFR BLD AUTO: 7.6 % — SIGNIFICANT CHANGE UP (ref 2–14)
NEUTROPHILS # BLD AUTO: 7.84 K/UL — HIGH (ref 1.8–7.4)
NEUTROPHILS NFR BLD AUTO: 79.5 % — HIGH (ref 43–77)
NRBC # BLD: 0 /100 WBCS — SIGNIFICANT CHANGE UP (ref 0–0)
OSMOLALITY UR: 336 MOS/KG — SIGNIFICANT CHANGE UP (ref 300–900)
PHOSPHATE SERPL-MCNC: 4.9 MG/DL — HIGH (ref 2.5–4.5)
PLATELET # BLD AUTO: 264 K/UL — SIGNIFICANT CHANGE UP (ref 150–400)
POTASSIUM SERPL-MCNC: 4 MMOL/L — SIGNIFICANT CHANGE UP (ref 3.5–5.3)
POTASSIUM SERPL-MCNC: 4.1 MMOL/L — SIGNIFICANT CHANGE UP (ref 3.5–5.3)
POTASSIUM SERPL-SCNC: 4 MMOL/L — SIGNIFICANT CHANGE UP (ref 3.5–5.3)
POTASSIUM SERPL-SCNC: 4.1 MMOL/L — SIGNIFICANT CHANGE UP (ref 3.5–5.3)
PROT SERPL-MCNC: 7.3 G/DL — SIGNIFICANT CHANGE UP (ref 6–8.3)
RBC # BLD: 3.67 M/UL — LOW (ref 3.8–5.2)
RBC # FLD: 14.8 % — HIGH (ref 10.3–14.5)
SODIUM SERPL-SCNC: 133 MMOL/L — LOW (ref 135–145)
SODIUM SERPL-SCNC: 134 MMOL/L — LOW (ref 135–145)
SODIUM UR-SCNC: 21 MMOL/L — SIGNIFICANT CHANGE UP
UUN UR-MCNC: 523 MG/DL — SIGNIFICANT CHANGE UP
WBC # BLD: 9.86 K/UL — SIGNIFICANT CHANGE UP (ref 3.8–10.5)
WBC # FLD AUTO: 9.86 K/UL — SIGNIFICANT CHANGE UP (ref 3.8–10.5)

## 2023-09-28 PROCEDURE — 99233 SBSQ HOSP IP/OBS HIGH 50: CPT

## 2023-09-28 PROCEDURE — 99232 SBSQ HOSP IP/OBS MODERATE 35: CPT | Mod: GC

## 2023-09-28 RX ORDER — NYSTATIN CREAM 100000 [USP'U]/G
1 CREAM TOPICAL
Refills: 0 | Status: DISCONTINUED | OUTPATIENT
Start: 2023-09-28 | End: 2023-09-29

## 2023-09-28 RX ORDER — SODIUM CHLORIDE 9 MG/ML
500 INJECTION, SOLUTION INTRAVENOUS ONCE
Refills: 0 | Status: COMPLETED | OUTPATIENT
Start: 2023-09-28 | End: 2023-09-28

## 2023-09-28 RX ORDER — SODIUM CHLORIDE 9 MG/ML
1000 INJECTION INTRAMUSCULAR; INTRAVENOUS; SUBCUTANEOUS ONCE
Refills: 0 | Status: COMPLETED | OUTPATIENT
Start: 2023-09-28 | End: 2023-09-28

## 2023-09-28 RX ADMIN — Medication 2 UNIT(S): at 17:27

## 2023-09-28 RX ADMIN — ATORVASTATIN CALCIUM 80 MILLIGRAM(S): 80 TABLET, FILM COATED ORAL at 21:26

## 2023-09-28 RX ADMIN — Medication 3: at 09:24

## 2023-09-28 RX ADMIN — SODIUM CHLORIDE 500 MILLILITER(S): 9 INJECTION, SOLUTION INTRAVENOUS at 17:58

## 2023-09-28 RX ADMIN — ENOXAPARIN SODIUM 40 MILLIGRAM(S): 100 INJECTION SUBCUTANEOUS at 17:21

## 2023-09-28 RX ADMIN — Medication 2: at 12:54

## 2023-09-28 RX ADMIN — CHLORHEXIDINE GLUCONATE 1 APPLICATION(S): 213 SOLUTION TOPICAL at 06:41

## 2023-09-28 RX ADMIN — SODIUM CHLORIDE 1000 MILLILITER(S): 9 INJECTION INTRAMUSCULAR; INTRAVENOUS; SUBCUTANEOUS at 11:35

## 2023-09-28 RX ADMIN — Medication 2: at 17:27

## 2023-09-28 RX ADMIN — GABAPENTIN 300 MILLIGRAM(S): 400 CAPSULE ORAL at 17:20

## 2023-09-28 RX ADMIN — Medication 2 UNIT(S): at 09:24

## 2023-09-28 RX ADMIN — Medication 2 UNIT(S): at 12:55

## 2023-09-28 RX ADMIN — INSULIN GLARGINE 13 UNIT(S): 100 INJECTION, SOLUTION SUBCUTANEOUS at 22:03

## 2023-09-28 RX ADMIN — Medication 1 TABLET(S): at 17:21

## 2023-09-28 RX ADMIN — NYSTATIN CREAM 1 APPLICATION(S): 100000 CREAM TOPICAL at 17:20

## 2023-09-28 RX ADMIN — GABAPENTIN 300 MILLIGRAM(S): 400 CAPSULE ORAL at 06:40

## 2023-09-28 NOTE — PROGRESS NOTE ADULT - SUBJECTIVE AND OBJECTIVE BOX
NEUROLOGY FOLLOW-UP CONSULT NOTE    RFC: ANSON shaking    Interval history: No acute neurologic events overnight. [place]  ID [id number] assisted.    MEDICATIONS  (STANDING):  atorvastatin 80 milliGRAM(s) Oral at bedtime  chlorhexidine 4% Liquid 1 Application(s) Topical <User Schedule>  enoxaparin Injectable 40 milliGRAM(s) SubCutaneous every 24 hours  gabapentin 300 milliGRAM(s) Oral two times a day  influenza  Vaccine (HIGH DOSE) 0.7 milliLiter(s) IntraMuscular once  insulin glargine Injectable (LANTUS) 13 Unit(s) SubCutaneous at bedtime  insulin lispro (ADMELOG) corrective regimen sliding scale   SubCutaneous three times a day before meals  insulin lispro (ADMELOG) corrective regimen sliding scale   SubCutaneous at bedtime  insulin lispro Injectable (ADMELOG) 2 Unit(s) SubCutaneous three times a day before meals  pantoprazole    Tablet 40 milliGRAM(s) Oral before breakfast    MEDICATIONS  (PRN):  acetaminophen     Tablet .. 650 milliGRAM(s) Oral every 6 hours PRN Temp greater or equal to 38C (100.4F), Mild Pain (1 - 3)  aluminum hydroxide/magnesium hydroxide/simethicone Suspension 30 milliLiter(s) Oral every 4 hours PRN Dyspepsia  melatonin 3 milliGRAM(s) Oral at bedtime PRN Insomnia  ondansetron Injectable 4 milliGRAM(s) IV Push every 8 hours PRN Nausea and/or Vomiting        PMHx/PSHx/FHx/SHx:  Convulsions    HTN (hypertension)    DM (diabetes mellitus)    Neuropathy    Duodenal cancer    Obstruction of biliary tree    Bipolar disorder    Seizure    Observed seizure-like activity    Leukocytosis    Duodenal cancer    Obstruction of biliary tree    HTN (hypertension)    DM (diabetes mellitus)    Hyperlipidemia    SYNCOPE    Head injury      Allergies:  Keflex (Urticaria)  penicillins (Unknown)      ROS: All systems negative except as documented in Interval history    O:  T(C): 37.1 (09-28-23 @ 11:00), Max: 37.2 (09-28-23 @ 06:04)  T(F): 98.8 (09-28-23 @ 11:00), Max: 98.9 (09-28-23 @ 06:04)  HR: 61 (09-28-23 @ 06:04) (61 - 72)  BP: 107/54 (09-28-23 @ 06:04) (107/54 - 141/68)  RR: 18 (09-28-23 @ 06:04) (18 - 18)  SpO2: 95% (09-28-23 @ 11:00) (95% - 97%)  Wt(kg): --    Focused neurologic exam:  MS - AAO x3, speech fluent, rep/naming intact, follows commands, attn/conc/recent and remote memory/fund of knowledge WNL  CN - PERRLA, EOMI, VFF, face sens/str/hearing WNL b/l, tongue/palate midline, trap 5/5 b/l  Motor - Normal bulk/tone, 5/5 all  Sens - LT/temp/vib intact all  DTR's - 2+ all and downgoing b/l plantar response  Coord - FtN intact b/l, intention tremors L >R with postural components  Gait and station - Not assessed due to fall risk    Pertinent labs/studies:      LABS:  cret                        10.4   9.86  )-----------( 264      ( 28 Sep 2023 08:30 )             31.8     09-28    133<L>  |  97  |  36<H>  ----------------------------<  230<H>  4.0   |  20<L>  |  2.16<H>    Study Date: 		09-27-23  Duration x Hours:          20 minCa    9.3      28 Sep 2023 08:30  Phos  4.9     09-28  Mg     2.0     09-28    TPro  7.3  /  Alb  3.2<L>  /  TBili  0.9  /  DBili  x   /  AST  25  /  ALT  30  /  AlkPhos  435<H>  09-28      < from: CT Head No Cont (09.25.23 @ 01:05) >    FINDINGS:  CT head:    Mild to moderate cerebral atrophy and mild chronic microvascular ischemic   change. Gray-white differentiation is preserved. The ventricles and sulci   are normal in size and configuration. The basilar cisterns are clear. No   focal edema or acute mass effect.There is no intracranial fluid   collection or acute hemorrhage.    There is no fracture identified. Opacified right frontal sinus. Scalp and   imaged facial soft tissues are within normal limits.    CT cervical spine:    Spinal alignment is anatomic. Craniocervical junction is normal. No acute   fracture. Vertebral body heights are maintained. Normal bone   mineralization. Disc osteophyte complex at C5-6 results in narrowing of   the central canal to 8 mm..    No gross upper cervical canal hematoma or mass. Cervical soft tissues are   unremarkable.      IMPRESSION:    1. No acute intracranial CT abnormality.  2. No acute fracture or traumatic malalignmen      < end of copied text >      EEG:  Study Date: 		09-27-23  Duration x Hours:          20 min  EEG Classification / Summary:  Normal EEG in the awake, drowsy and asleep state(s).    Clinical Impression:  There were no epileptiform abnormalities recorded.  t in the cervical spine.

## 2023-09-28 NOTE — DISCHARGE NOTE PROVIDER - HOSPITAL COURSE
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 The fall occurred when transferring from the wheelchair in the bathroom while at rehab facility, hitting her forehead, associated with brief loss of consciousness, nausea, and vomiting. While in the ED, following CTH, pt became unresponsive with questionable shaking episode featuring RUE shaking <5 min/contraction, staring off into distance, and vomiting. Pt given 1g Keppra load at this time; while pending intubation she returned to baseline. 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 The fall occurred when transferring from the wheelchair in the bathroom while at rehab facility, hitting her forehead, associated with brief loss of consciousness, nausea, and vomiting. While in the ED, following CTH, pt became unresponsive with questionable shaking episode featuring RUE shaking <5 min/contraction, staring off into distance, and vomiting. Pt given 1g Keppra load at this time; while pending intubation she returned to baseline. While in the ED, pt was also noted to have leukocytosis to 11.63, lactate 2.1, and one dose of Levaquin was given. CT head, spine, abdomen, and pelvis without any acute injury or source of infection. Neurology was consulted and recommended EEG, which did not show any epileptiform activity, slowing, or seizures. It was also determined that pt has a chronic essential tremor of the RUE, and sees a neurologist for it outpatient; therefore there was less suspicion that the shaking seen was due to an actual seizure.     Remainder of infectious workup including BCx, UCx, RVP all negative. RUQ ultrasound also performed due to concern of biliary duct putting out minimal outut, which showed that the drain was properly in place and no active infection seen.     Pt was noted to have an increase in her Cr since admission (1.64 --> 2.16 on 9/28) along with positive orthostatic blood pressures; urine studies were unremarkable, and 1L fluid bolus was given, with improvement __.     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 The fall occurred when transferring from the wheelchair in the bathroom while at rehab facility, hitting her forehead, associated with brief loss of consciousness, nausea, and vomiting. While in the ED, following CTH, pt became unresponsive with questionable shaking episode featuring RUE shaking <5 min/contraction, staring off into distance, and vomiting. Pt given 1g Keppra load at this time; while pending intubation she returned to baseline. While in the ED, pt was also noted to have leukocytosis to 11.63, lactate 2.1, and one dose of Levaquin was given. CT head, spine, abdomen, and pelvis without any acute injury or source of infection. Neurology was consulted and recommended EEG, which did not show any epileptiform activity, slowing, or seizures. It was also determined that pt has a chronic essential tremor of the RUE, and sees a neurologist for it outpatient; therefore there was less suspicion that the shaking seen was due to an actual seizure.     Remainder of infectious workup including BCx, UCx, RVP all negative. RUQ ultrasound also performed due to concern of biliary duct putting out minimal outut, which showed that the drain was properly in place and no active infection seen.     Pt was noted to have an increase in her Cr since admission (1.64 --> 2.16 on 9/28) along with positive orthostatic blood pressures; urine studies were unremarkable, and 1L fluid bolus was given, with improvement to 2.06.    At this time, pt is medically optimized for discharge to [facility] of family's choosing.    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 The fall occurred when transferring from the wheelchair in the bathroom while at rehab facility, hitting her forehead, associated with brief loss of consciousness, nausea, and vomiting. While in the ED, following CTH, pt became unresponsive with questionable shaking episode featuring RUE shaking <5 min/contraction, staring off into distance, and vomiting. Pt given 1g Keppra load at this time; while pending intubation she returned to baseline. While in the ED, pt was also noted to have leukocytosis to 11.63, lactate 2.1, and one dose of Levaquin was given. CT head, spine, abdomen, and pelvis without any acute injury or source of infection. Neurology was consulted and recommended EEG, which did not show any epileptiform activity, slowing, or seizures. It was also determined that pt has a chronic essential tremor of the RUE, and sees a neurologist for it outpatient; therefore there was less suspicion that the shaking seen was due to an actual seizure.     Remainder of infectious workup including BCx, UCx, RVP all negative. RUQ ultrasound also performed due to concern of biliary duct putting out minimal outut, which showed that the drain was properly in place and no active infection seen.     Pt was noted to have an DANA that improved with IVF.    At this time, pt is medically optimized for discharge to [facility].    MEDICATION CHANGES:  - Stop amlodipine 5mg due to recent fall

## 2023-09-28 NOTE — DIETITIAN INITIAL EVALUATION ADULT - REASON FOR ADMISSION
Convulsions.  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 Neuro consulted, likely seizure due to head strike. Pt afebrile, normotensive and reassuring imaging studies, however lab work significant for leukocytosis to 11.63, lactate 2.1. Infectious w/u unremarkable. S/p EEG per neuro recs, also wnl with no epileptiform activity. W/u also done to r/o other causes of syncope, as pt does not recall her fall, unremarkable so far."

## 2023-09-28 NOTE — DIETITIAN INITIAL EVALUATION ADULT - ENERGY INTAKE
Poor (<50%) Currently, pt stated she is eating poorly in-house. Pt stated she does not like the soft and bite sized foods, and is fatigued of eating the same foods. Pt stated for breakfast today she had pudding, a muffin and tea, and for lunch she had soup and pudding. RD observed pt's lunch tray at her bedside, all of the food was untouched bedsides the soup and pudding container. Pt is amenable to receiving Glucerna 1x/day and a Diet Magic Cup 1x/day to optimize protein-energy intake. RD obtained food preferences to optimize PO intake, will honor as able.      Note: RD spoke with provider about soft and bite sized diet. Provider stated that the pt's family mentioned that the soft and bite sized is what the pt was eating at home and at the rehab.  Currently, pt stated she is eating poorly in-house. Pt stated she does not like the soft and bite sized foods, and is fatigued of eating the same foods. Pt stated for breakfast today she had pudding, a muffin and tea, and for lunch she had soup and pudding. RD observed pt's lunch tray at her bedside, all of the food was untouched bedsides the soup and pudding container. Pt is amenable to receiving Glucerna 1x/day to optimize protein-energy intake. RD obtained food preferences to optimize PO intake, will honor as able.      Note: RD spoke with provider about soft and bite sized diet. Provider stated that the pt's family mentioned that the soft and bite sized is what the pt was eating at home and at the rehab.  Currently, pt stated she is eating poorly in-house. Pt stated she does not like the soft and bite sized foods, and is fatigued of eating the same foods. Pt stated for breakfast today she had pudding, a muffin and tea, and for lunch she had soup and pudding. RD observed pt's lunch tray at her bedside, all of the food was untouched bedsides the soup and pudding container. Pt is amenable to receiving Glucerna 1x/day to optimize protein-energy intake and Diet Mighty Shake 1x/day. RD obtained food preferences to optimize PO intake, will honor as able.      Note: RD spoke with provider about soft and bite sized diet. Provider stated that the pt's family mentioned that the soft and bite sized is what the pt was eating at home and at the rehab.

## 2023-09-28 NOTE — DIETITIAN INITIAL EVALUATION ADULT - ORAL INTAKE PTA/DIET HISTORY
Nutrition assessment completed at bedside. Pt declined use of interpretation services. Pt reports poor appetite and PO intake for ~5-6 weeks prior to admission after gastric/duodenal stent and biliary drain placement. Pt stated ever since, she has been throwing up 1-2 times a week, and has not been eating much. Pt endorses possible 40 lb weight loss since the procedures in setting of poor PO intake. Pt stated her UBW this year was ~260 pounds, and states now she is ~200 pounds (question accuracy). Confirmed no known food allergies.

## 2023-09-28 NOTE — DIETITIAN INITIAL EVALUATION ADULT - FACTORS AFF FOOD INTAKE
Pt stated she does not like the food provided in-house, she does not like the soft & bite sized options./persistent lack of appetite

## 2023-09-28 NOTE — DIETITIAN INITIAL EVALUATION ADULT - OTHER CALCULATIONS
Estimated protein-energy needs calculated using IBW +10% with consideration for BMI >40 (overweight), malnutrition, and increased needs for duodenal cancer.  Defer fluid calculations to the medical team.

## 2023-09-28 NOTE — DIETITIAN INITIAL EVALUATION ADULT - ADD RECOMMEND
1) Recommend advancing to regular textured diet as medically feasible vs obtaining formal Speech Language Pathologist consult - Pt reports no prior chewing or swallowing deficits, dislikes current diet and is not eating well. Continue Consistent Carbohydrate/DASH therapeutic restrictions.   2) Recommend Glucerna 1x/day to optimize protein-energy intake.   3) RD to add diet magic cup 1x/day to optimize protein-energy intake. RD obtained food preferences, will honor as able.   4) Consider adding multivitamin for micronutrient coverage unless contraindicated.   5) Monitor blood glucose fingersticks. Obtain a current A1C as medically feasible.   6) Monitor nutritional intake, tolerance to diet prescription, weights, labs, and skin integrity.   7) Malnutrition and BMI alert placed in chart.  1) Recommend advancing to regular textured diet as medically feasible vs obtaining formal Speech Language Pathologist consult - Pt reports no prior chewing or swallowing deficits, dislikes current diet and is not eating well. Continue Consistent Carbohydrate/DASH therapeutic restrictions.   2) Recommend Glucerna 1x/day to optimize protein-energy intake.   3) RD obtained food preferences, will honor as able.   4) Consider adding multivitamin for micronutrient coverage unless contraindicated.   5) Monitor blood glucose fingersticks. Obtain a current A1C as medically feasible.   6) Monitor nutritional intake, tolerance to diet prescription, weights, labs, and skin integrity.   7) Malnutrition and BMI alert placed in chart.  1) Recommend advancing to regular textured diet as medically feasible vs obtaining formal Speech Language Pathologist consult - Pt reports no prior chewing or swallowing deficits, dislikes current diet and is not eating well. Continue Consistent Carbohydrate/DASH therapeutic restrictions.   2) Recommend Glucerna 1x/day to optimize protein-energy intake.   3) RD obtained food preferences, will honor as able. RD to add Diet Mighty Shake 1x/day.  4) Consider adding multivitamin for micronutrient coverage unless contraindicated.   5) Monitor blood glucose fingersticks. Obtain a current A1C as medically feasible.   6) Monitor nutritional intake, tolerance to diet prescription, weights, labs, and skin integrity.   7) Malnutrition and BMI alert placed in chart.

## 2023-09-28 NOTE — PROGRESS NOTE ADULT - PROBLEM SELECTOR PLAN 1
- Pt w/ <5 min of RUE contraction and shaking in ED with unresponsiveness; no seizure history in past, but hit head on bathroom floor at rehab facility   - Keppra loaded (1g) following which pt returned to baseline  - per neuro, RUE shaking, positional dizziness and syncope likely related to vasovagal syncope, b/l UE tremors likely essential tremors  - Keppra d/c given symptoms resolved and RUE tremors are chronic, per patient  - EEG wnl with no epileptiform activity, unlikely seizure activity   - f/u and metabolic labs: TSH, free T4, Vitamin B1, B6, B12, D 25-hydroxy, folate, copper - negative thusfar  - orthostatics (+) this AM - Pt w/ <5 min of RUE contraction and shaking in ED with unresponsiveness; no seizure history in past, but hit head on bathroom floor at rehab facility   - Keppra loaded (1g) following which pt returned to baseline  - per neuro, RUE shaking, positional dizziness and syncope likely related to vasovagal syncope, b/l UE tremors likely essential tremors  - Keppra d/c given symptoms resolved and RUE tremors are chronic, per patient  - EEG wnl with no epileptiform activity, unlikely seizure activity   - f/u and metabolic labs: TSH, free T4, Vitamin B1, B6, B12, D 25-hydroxy, folate, copper - negative thusfar  - orthostatics (+) 9/27

## 2023-09-28 NOTE — DIETITIAN INITIAL EVALUATION ADULT - PERTINENT LABORATORY DATA
09-28    133<L>  |  97  |  36<H>  ----------------------------<  230<H>  4.0   |  20<L>  |  2.16<H>    Ca    9.3      28 Sep 2023 08:30  Phos  4.9     09-28  Mg     2.0     09-28    TPro  7.3  /  Alb  3.2<L>  /  TBili  0.9  /  DBili  x   /  AST  25  /  ALT  30  /  AlkPhos  435<H>  09-28  POCT Blood Glucose.: 240 mg/dL (09-28-23 @ 12:35)

## 2023-09-28 NOTE — DIETITIAN INITIAL EVALUATION ADULT - NS FNS DIET ORDER
Diet, Soft and Bite Sized:   Consistent Carbohydrate {Evening Snack} (CSTCHOSN)  DASH/TLC {Sodium & Cholesterol Restricted} (DASH) (09-25-23 @ 13:42) [Active]

## 2023-09-28 NOTE — DIETITIAN INITIAL EVALUATION ADULT - REASON INDICATOR FOR ASSESSMENT
RD consult warranted for MST Score 2 or >  Source: Patient, Registered Nurse, Paper Chart, Electronic Medical Record  Chart reviewed, events noted.

## 2023-09-28 NOTE — DIETITIAN INITIAL EVALUATION ADULT - OTHER INFO
Weights:  - UBW (per patient): 260 pounds (~6 weeks ago)  - Dosing Weight (per chart): 232.5 pounds (09/25) (bed) (used for BMI)  - Daily Weights (per flow sheets): 232.8 pounds (09/27)  - Bed Scale Weight (taken by RD): 230.56 pounds (09/28) (question accuracy)  - Per [facility] HIE: 250 pounds (09/24), 263.11 pounds (09/05), 262.6 pounds (07/24), 250 pounds (12/16/2019)  Note: Pt has experienced a possible ~32.6 pound weight loss (12.4% wt change, clinically significant) x 3 weeks. RD to continue to monitor weights and trends as able.     Pt with observed seizure-like activity (per chart).  - Per chart, "EEG wnl with no epileptiform activity, unlikely seizure activity."    Pt with duodenal cancer, recently diagnosed at [facility] (per chart).  - Pt is s/p duodenal stent and percutaneous intrahepatic biliary drain placement (per chart).     Pt with DM (per chart).   - Ordered for insulin regimen in-house for glycemic control (per orders).  - No recent A1C level documented, monitor A1C and blood glucose fingersticks.   - Glucose elevated, 230 mg/dL (09/28) (per chart)    Sodium low today, 133 mmol/L (09/28) (per chart).   Phosphorous elevated today, 4.9 mg/dL (09/28) (per chart).  BUN and Creatinine elevated today (09/28) (per chart).

## 2023-09-28 NOTE — PROGRESS NOTE ADULT - ASSESSMENT
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 Neuro consulted, likely seizure due to head strike. Pt afebrile, normotensive and reassuring imaging studies, however lab work significant for leukocytosis to 11.63, lactate 2.1. Infectious w/u unremarkable. S/p EEG per neuro recs, also wnl with no epileptiform activity. W/u also ongoing to r/o other causes of syncope, as pt does not recall her fall.  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 Neuro consulted, likely seizure due to head strike. Pt afebrile, normotensive and reassuring imaging studies, however lab work significant for leukocytosis to 11.63, lactate 2.1. Infectious w/u unremarkable. S/p EEG per neuro recs, also wnl with no epileptiform activity. W/u also done to r/o other causes of syncope, as pt does not recall her fall, unremarkable so far.

## 2023-09-28 NOTE — DIETITIAN INITIAL EVALUATION ADULT - NSFNSADHERENCEPTAFT_GEN_A_CORE
Prior to admission, pt stated she followed a regular diet at home, no therapeutic restrictions. Pt stated she followed a regular diet at [facility] as well? Unable to confirm in paper chart. Pt stated she would typically eat oatmeal with milk and eggs for breakfast, and for lunch and dinner she would have soup, and meat with rice or potatoes. Pt stated she monitored her blood glucose levels daily at breakfast and dinner, and took medications daily for her diabetes. Per H&P, pt takes Glucophage and Glucotrol for blood glucose management.

## 2023-09-28 NOTE — DISCHARGE NOTE PROVIDER - NSDCCPTREATMENT_GEN_ALL_CORE_FT
PRINCIPAL PROCEDURE  Procedure: CT head  Findings and Treatment:   < end of copied text >  CT head:  Mild to moderate cerebral atrophy and mild chronic microvascular ischemic   change. Gray-white differentiation is preserved. The ventricles and sulci   are normal in size and configuration. The basilar cisterns are clear. No   focal edema or acute mass effect.There is no intracranial fluid   collection or acute hemorrhage.  There is no fracture identified. Opacified right frontal sinus. Scalp and   imaged facial soft tissues are within normal limits.  CT cervical spine:  Spinal alignment is anatomic. Craniocervical junction is normal. No acute   fracture. Vertebral body heights are maintained. Normal bone   mineralization. Disc osteophyte complex at C5-6 results in narrowing of   the central canal to 8 mm..  No gross upper cervical canal hematoma or mass. Cervical soft tissues are   unremarkable.  IMPRESSION:  1. No acute intracranial CT abnormality.  2. No acute fracture or traumatic malalignment in the cervical spine.< from: CT Head No Cont (09.25.23 @ 01:05) >        SECONDARY PROCEDURE  Procedure: CT abdomen  Findings and Treatment:   < end of copied text >  PROCEDURE:  CT of the Chest, Abdomen and Pelvis was performed.  Imaging was performed through the chest in the arterial phase followed by   imaging of the abdomen and pelvis in the portal venous phase.  Sagittal and coronal reformats were performed.  FINDINGS:  CHEST:  LUNGS AND LARGE AIRWAYS: No pulmonary contusion or laceration. No   suspicious pulmonary mass.  PLEURA: No effusion or pneumothorax.  VESSELS: Pulmonary arteries are opacified to the proximal segmental   level, no acute thromboembolus. Main pulmonary artery is normal in   caliber. No thoracic aortic aneurysm or dissection. Coronary artery   calcifications.  HEART: Cardiomegaly. No pericardial effusion.  MEDIASTINUM AND FLORENCIO: No mediastinal hemorrhage or adenopathy  CHEST WALL AND LOWER NECK: Unremarkable  ABDOMEN AND PELVIS:  LIVER: Enlarged  BILE DUCTS: Percutaneous transhepatic biliary drain terminates in the   region of the ampulla. Moderate intrahepatic biliary ductal dilatation.   Cystic duct is dilated. Common duct is dilated up to 1.4 cm.  GALLBLADDER: Cholelithiasis.  SPLEEN: Within normal limits.  PANCREAS: Fatty infiltration of the pancreatic head  ADRENALS: Within normal limits  KIDNEYS/URETERS: Within normal limits.  BLADDER: Within normal limits.  REPRODUCTIVE ORGANS: Unremarkable CT appearance for age  BOWEL: Stent extends from the gastric antrum to the third portion of the   duodenum. No abnormal gastric distention. No bowel obstruction.. Appendix   is not visualized, no secondary findings of appendicitis.  PERITONEUM: No free air or free fluid  VESSELS: Atherosclerotic changes.  RETROPERITONEUM/LYMPH NODES: No enlarged lymph node measuring greater   than 10 mm in short axis  ABDOMINAL WALL: Small fat-containing periumbilical hernia  BONES: No destructive osseous lesion. No acute fracture.< from: CT Abdomen and Pelvis w/ IV Cont (09.25.23 @ 01:09) >

## 2023-09-28 NOTE — PROGRESS NOTE ADULT - ASSESSMENT
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 + cp ten hours prior. Neurology consulted c/f seizures. Patient was loaded with keppra. CT head negative.    IMPRESSION   RUE shaking, positional dizziness and syncope likely related to vasovagal syncope with possible components of orthostatic hypotension. B/L UE tremors likely essential tremors ( Patient has been followed by a neurologist outpatient). No epileptiform pattern or seizure on EEG.    RECOMMENDATION    [] rEEG 9/27: No epileptiform pattern or seizure recorded  [] orthostatic BP (+ for orthostatic hypotension from lying to sitting), consider follow up with cardiology team  [] metabolic labs: TSH (0.74), T4 (10.1), Vitamin B1, B6, B12 (777), D 1,25-hydroxy (34.4), please check vitamin D25-hydroxy level, folate (7.6), copper  [] rest of care per primary team  [] No further inpatient neurology recommendation, will sign off, please call consult service [phone] with any questions.  [] Patient can follow up with her established outpatient neurologist or Patient can follow up with general neurology at [address] [place] [address] 1-2 weeks after discharge by calling [phone] to schedule this appointment    Plans discussed with neurology attending, Dr. Hendrix

## 2023-09-28 NOTE — DISCHARGE NOTE PROVIDER - NSDCMRMEDTOKEN_GEN_ALL_CORE_FT
Acidophilus oral tablet: 2 tab(s) orally once a day  amLODIPine 5 mg oral tablet: 1 tab(s) orally once a day  carvedilol 12.5 mg oral tablet: 1 tab(s) orally every 12 hours  cyanocobalamin 100 mcg oral tablet: 1 tab(s) orally once a day  Feosol 325 mg (65 mg elemental iron) oral tablet: 1 tab(s) orally once a day  gabapentin 300 mg oral tablet: orally 2 times a day  insulin glargine 100 units/mL subcutaneous solution: 15 subcutaneous once a day (at bedtime)  insulin lispro 100 units/mL subcutaneous solution: 2 subcutaneous 3 times a day (before meals)  nitroglycerin 0.2 mg/hr transdermal film, extended release: 1 patch transdermally once a day  omeprazole 40 mg oral delayed release capsule: 1 cap(s) orally once a day  rosuvastatin 40 mg oral tablet: 1 tab(s) orally once a day   Acidophilus oral tablet: 2 tab(s) orally once a day  carvedilol 12.5 mg oral tablet: 1 tab(s) orally every 12 hours  cyanocobalamin 100 mcg oral tablet: 1 tab(s) orally once a day  Feosol 325 mg (65 mg elemental iron) oral tablet: 1 tab(s) orally once a day  gabapentin 300 mg oral tablet: orally 2 times a day  insulin glargine 100 units/mL subcutaneous solution: 15 subcutaneous once a day (at bedtime)  insulin lispro 100 units/mL subcutaneous solution: 2 subcutaneous 3 times a day (before meals)  Multiple Vitamins oral tablet: 1 tab(s) orally once a day  nitroglycerin 0.2 mg/hr transdermal film, extended release: 1 patch transdermally once a day  nystatin 100,000 units/g topical powder: 1 Apply topically to affected area 2 times a day  omeprazole 40 mg oral delayed release capsule: 1 cap(s) orally once a day  rosuvastatin 40 mg oral tablet: 1 tab(s) orally once a day

## 2023-09-28 NOTE — DIETITIAN INITIAL EVALUATION ADULT - NSFNSNUTRCHEWSWALLOWFT_GEN_A_CORE
Pt reported no chewing or swallowing difficulties at this time. Defer diet texture/consistency to Speech Language Pathologist/Medical Team.

## 2023-09-28 NOTE — DIETITIAN INITIAL EVALUATION ADULT - PERSON TAUGHT/METHOD
Encouraged consumption of HBV foods, prioritizing protein foods first at meal times, and importance of  meeting adequate protein-energy needs. Encouraged consumption of oral nutrition supplement. Encouraged small, frequent meals. Emphasized importance of consuming nutrient dense foods and snacks. Provided education on Nutrition Therapy for Type 2 Diabetes. Emphasis on what foods contain carbohydrates, importance of pairing carbohydrates with protein for glycemic control; choosing whole grains vs refined carbohydrates; limiting refined sugars, portion sizes. Good comprehension noted. Obtained food preferences. Pt aware RD remains available./verbal instruction/written material/patient instructed

## 2023-09-28 NOTE — PROGRESS NOTE ADULT - SUBJECTIVE AND OBJECTIVE BOX
Lynsey Brice MD  PGY-1      Patient is a 72y old  Female who presents with a chief complaint of Fall, c/f seizures (26 Sep 2023 11:50)      SUBJECTIVE / OVERNIGHT EVENTS:  No acute events overnight. S/p EEG.  At this time has a cough, states this has been since her fall. Patient at this time denies fevers, chills, CP, SOB, nausea, vomiting, diarrhea, constipation, dysuria. Patient seen and examined at bedside.    MEDICATIONS  (STANDING):  atorvastatin 80 milliGRAM(s) Oral at bedtime  chlorhexidine 4% Liquid 1 Application(s) Topical <User Schedule>  enoxaparin Injectable 40 milliGRAM(s) SubCutaneous every 24 hours  gabapentin 300 milliGRAM(s) Oral two times a day  influenza  Vaccine (HIGH DOSE) 0.7 milliLiter(s) IntraMuscular once  insulin glargine Injectable (LANTUS) 13 Unit(s) SubCutaneous at bedtime  insulin lispro (ADMELOG) corrective regimen sliding scale   SubCutaneous three times a day before meals  insulin lispro (ADMELOG) corrective regimen sliding scale   SubCutaneous at bedtime  insulin lispro Injectable (ADMELOG) 2 Unit(s) SubCutaneous three times a day before meals  pantoprazole    Tablet 40 milliGRAM(s) Oral before breakfast    MEDICATIONS  (PRN):  acetaminophen     Tablet .. 650 milliGRAM(s) Oral every 6 hours PRN Temp greater or equal to 38C (100.4F), Mild Pain (1 - 3)  aluminum hydroxide/magnesium hydroxide/simethicone Suspension 30 milliLiter(s) Oral every 4 hours PRN Dyspepsia  melatonin 3 milliGRAM(s) Oral at bedtime PRN Insomnia  ondansetron Injectable 4 milliGRAM(s) IV Push every 8 hours PRN Nausea and/or Vomiting      CAPILLARY BLOOD GLUCOSE    POCT Blood Glucose.: 187 mg/dL (27 Sep 2023 21:32)  POCT Blood Glucose.: 202 mg/dL (27 Sep 2023 17:33)  POCT Blood Glucose.: 201 mg/dL (27 Sep 2023 12:17)  POCT Blood Glucose.: 172 mg/dL (27 Sep 2023 08:23)    I&O's Summary    27 Sep 2023 07:01  -  28 Sep 2023 07:00  --------------------------------------------------------  IN: 1340 mL / OUT: 3350 mL / NET: -2010 mL      PHYSICAL EXAM:    Vital Signs Last 24 Hrs  T(C): 37.2 (28 Sep 2023 06:04), Max: 37.2 (28 Sep 2023 06:04)  T(F): 98.9 (28 Sep 2023 06:04), Max: 98.9 (28 Sep 2023 06:04)  HR: 61 (28 Sep 2023 06:04) (61 - 72)  BP: 107/54 (28 Sep 2023 06:04) (107/54 - 141/68)  RR: 18 (28 Sep 2023 06:04) (18 - 18)  SpO2: 96% (28 Sep 2023 06:04) (96% - 97%)    Parameters below as of 28 Sep 2023 06:04  Patient On (Oxygen Delivery Method): room air    GENERAL: No acute distress, well-developed  HEAD:  Atraumatic, Normocephalic  EYES: EOMI, PERRLA, conjunctiva and sclera clear  NECK: Supple, no lymphadenopathy, no JVD  CHEST/LUNG: CTAB; No wheezes, rales, or rhonchi  HEART: Regular rate and rhythm; No murmurs, rubs, or gallops  ABDOMEN: Soft, non-tender, non-distended; normal bowel sounds, no organomegaly  EXTREMITIES:  2+ peripheral pulses b/l, No clubbing, cyanosis, or edema  NEUROLOGY: A&O x 3, no focal deficits  SKIN: No rashes or lesions    LABS:                                   10.6   11.83 )-----------( 286      ( 26 Sep 2023 11:10 )             31.7     09-26    133<L>  |  96  |  37<H>  ----------------------------<  165<H>  3.9   |  22  |  1.93<H>    Ca    9.6      26 Sep 2023 11:10  Phos  4.2     09-26  Mg     2.0     09-26      Urinalysis Basic - ( 26 Sep 2023 11:10 )    Color: x / Appearance: x / SG: x / pH: x  Gluc: 165 mg/dL / Ketone: x  / Bili: x / Urobili: x   Blood: x / Protein: x / Nitrite: x   Leuk Esterase: x / RBC: x / WBC x   Sq Epi: x / Non Sq Epi: x / Bacteria: x      Culture - Blood (collected 25 Sep 2023 22:32)  Source: .Blood Blood-Peripheral  Preliminary Report (27 Sep 2023 02:02):    No growth at 24 hours    Culture - Blood (collected 25 Sep 2023 12:47)  Source: .Blood Blood-Peripheral  Preliminary Report (26 Sep 2023 17:02):    No growth at 24 hours        RADIOLOGY & ADDITIONAL TESTS:     Lynsey Brice MD  PGY-1      Patient is a 72y old  Female who presents with a chief complaint of Fall, c/f seizures (26 Sep 2023 11:50)      SUBJECTIVE / OVERNIGHT EVENTS: No acute events overnight. S/p EEG. Cough improving. Patient at this time denies fevers, chills, CP, SOB, nausea, vomiting, diarrhea, constipation, dysuria. Patient seen and examined at bedside, also FaceTimed w/ patient's son Scot to provide updates and start d/c planning.    MEDICATIONS  (STANDING):  atorvastatin 80 milliGRAM(s) Oral at bedtime  chlorhexidine 4% Liquid 1 Application(s) Topical <User Schedule>  enoxaparin Injectable 40 milliGRAM(s) SubCutaneous every 24 hours  gabapentin 300 milliGRAM(s) Oral two times a day  influenza  Vaccine (HIGH DOSE) 0.7 milliLiter(s) IntraMuscular once  insulin glargine Injectable (LANTUS) 13 Unit(s) SubCutaneous at bedtime  insulin lispro (ADMELOG) corrective regimen sliding scale   SubCutaneous three times a day before meals  insulin lispro (ADMELOG) corrective regimen sliding scale   SubCutaneous at bedtime  insulin lispro Injectable (ADMELOG) 2 Unit(s) SubCutaneous three times a day before meals  pantoprazole    Tablet 40 milliGRAM(s) Oral before breakfast    MEDICATIONS  (PRN):  acetaminophen     Tablet .. 650 milliGRAM(s) Oral every 6 hours PRN Temp greater or equal to 38C (100.4F), Mild Pain (1 - 3)  aluminum hydroxide/magnesium hydroxide/simethicone Suspension 30 milliLiter(s) Oral every 4 hours PRN Dyspepsia  melatonin 3 milliGRAM(s) Oral at bedtime PRN Insomnia  ondansetron Injectable 4 milliGRAM(s) IV Push every 8 hours PRN Nausea and/or Vomiting      CAPILLARY BLOOD GLUCOSE    POCT Blood Glucose.: 187 mg/dL (27 Sep 2023 21:32)  POCT Blood Glucose.: 202 mg/dL (27 Sep 2023 17:33)  POCT Blood Glucose.: 201 mg/dL (27 Sep 2023 12:17)  POCT Blood Glucose.: 172 mg/dL (27 Sep 2023 08:23)    I&O's Summary    27 Sep 2023 07:01  -  28 Sep 2023 07:00  --------------------------------------------------------  IN: 1340 mL / OUT: 3350 mL / NET: -2010 mL      PHYSICAL EXAM:    Vital Signs Last 24 Hrs  T(C): 37.2 (28 Sep 2023 06:04), Max: 37.2 (28 Sep 2023 06:04)  T(F): 98.9 (28 Sep 2023 06:04), Max: 98.9 (28 Sep 2023 06:04)  HR: 61 (28 Sep 2023 06:04) (61 - 72)  BP: 107/54 (28 Sep 2023 06:04) (107/54 - 141/68)  RR: 18 (28 Sep 2023 06:04) (18 - 18)  SpO2: 96% (28 Sep 2023 06:04) (96% - 97%)    Parameters below as of 28 Sep 2023 06:04  Patient On (Oxygen Delivery Method): room air    GENERAL: No acute distress, well-developed  HEAD:  Atraumatic, Normocephalic  EYES: EOMI, PERRLA, conjunctiva and sclera clear  NECK: Supple, no lymphadenopathy, no JVD  CHEST/LUNG: CTAB; No wheezes, rales, or rhonchi  HEART: Regular rate and rhythm; No murmurs, rubs, or gallops  ABDOMEN: Soft, non-tender, non-distended; normal bowel sounds, no organomegaly. Bile duct drain in place, draining large volume of dark orange fluid  EXTREMITIES:  2+ peripheral pulses b/l, No clubbing, cyanosis, or edema  NEUROLOGY: A&O x 3, no focal deficits  SKIN: No rashes or lesions    LABS:                          10.4   9.86  )-----------( 264      ( 28 Sep 2023 08:30 )             31.8       09-26    133<L>  |  96  |  37<H>  ----------------------------<  165<H>  3.9   |  22  |  1.93<H>    Ca    9.6      26 Sep 2023 11:10  Phos  4.2     09-26  Mg     2.0     09-26          Urinalysis Basic - ( 26 Sep 2023 11:10 )    Color: x / Appearance: x / SG: x / pH: x  Gluc: 165 mg/dL / Ketone: x  / Bili: x / Urobili: x   Blood: x / Protein: x / Nitrite: x   Leuk Esterase: x / RBC: x / WBC x   Sq Epi: x / Non Sq Epi: x / Bacteria: x      Culture - Blood (collected 25 Sep 2023 22:32)  Source: .Blood Blood-Peripheral  Preliminary Report (27 Sep 2023 02:02):    No growth at 24 hours    Culture - Blood (collected 25 Sep 2023 12:47)  Source: .Blood Blood-Peripheral  Preliminary Report (26 Sep 2023 17:02):    No growth at 24 hours        RADIOLOGY & ADDITIONAL TESTS:

## 2023-09-28 NOTE — DISCHARGE NOTE PROVIDER - NSDCCPCAREPLAN_GEN_ALL_CORE_FT
PRINCIPAL DISCHARGE DIAGNOSIS  Diagnosis: Accidental fall from wheelchair  Assessment and Plan of Treatment: You came to the hospital because you fell from your wheelchair onto the floor, presumably due to weakness. You had imaging studies done of the head, spine, abdomen, and pelvis, which were all normal and no images showed signs of fracture or trauma. You were also worked up for seizure activity because right arm shaking was noted by the emergency department, however it does not appear that you suffered from a seizure, and this arm shaking is likely due to a chronic tremor. Please return to the hospital if you feel dizzy, lightheaded, or faint. Please return to the hospital if you suffer an additional fall, particularly one that involves head trauma.

## 2023-09-28 NOTE — DIETITIAN INITIAL EVALUATION ADULT - ETIOLOGY
related to inability to meet sufficient protein-energy needs in setting of poor PO intake  related to increased physiological demand for nutrients

## 2023-09-28 NOTE — DISCHARGE NOTE PROVIDER - DETAILS OF MALNUTRITION DIAGNOSIS/DIAGNOSES
This patient has been assessed with a concern for Malnutrition and was treated during this hospitalization for the following Nutrition diagnosis/diagnoses:     -  09/28/2023: Severe protein-calorie malnutrition   -  09/28/2023: Morbid obesity (BMI > 40)

## 2023-09-28 NOTE — DIETITIAN INITIAL EVALUATION ADULT - NSFNSGIIOFT_GEN_A_CORE
Pt reported no nausea, vomiting, diarrhea or constipation at this time. Pt stated last BM was yesterday, 09/27.   - Note: Pt is ordered for aluminum hydroxide/magnesium hydroxide/simethicone Suspension, Zofran and Protonix (per orders).   Last documented BM: 09/27 (per flow sheet)  Bowel Regimen: Pt is not currently ordered for a bowel regimen at this time (per orders)     Site: Right flank drainage, output 400 mL today (09/28)

## 2023-09-28 NOTE — DIETITIAN INITIAL EVALUATION ADULT - PERTINENT MEDS FT
MEDICATIONS  (STANDING):  atorvastatin 80 milliGRAM(s) Oral at bedtime  chlorhexidine 4% Liquid 1 Application(s) Topical <User Schedule>  enoxaparin Injectable 40 milliGRAM(s) SubCutaneous every 24 hours  gabapentin 300 milliGRAM(s) Oral two times a day  influenza  Vaccine (HIGH DOSE) 0.7 milliLiter(s) IntraMuscular once  insulin glargine Injectable (LANTUS) 13 Unit(s) SubCutaneous at bedtime  insulin lispro (ADMELOG) corrective regimen sliding scale   SubCutaneous three times a day before meals  insulin lispro (ADMELOG) corrective regimen sliding scale   SubCutaneous at bedtime  insulin lispro Injectable (ADMELOG) 2 Unit(s) SubCutaneous three times a day before meals  pantoprazole    Tablet 40 milliGRAM(s) Oral before breakfast    MEDICATIONS  (PRN):  acetaminophen     Tablet .. 650 milliGRAM(s) Oral every 6 hours PRN Temp greater or equal to 38C (100.4F), Mild Pain (1 - 3)  aluminum hydroxide/magnesium hydroxide/simethicone Suspension 30 milliLiter(s) Oral every 4 hours PRN Dyspepsia  melatonin 3 milliGRAM(s) Oral at bedtime PRN Insomnia  ondansetron Injectable 4 milliGRAM(s) IV Push every 8 hours PRN Nausea and/or Vomiting

## 2023-09-28 NOTE — DISCHARGE NOTE PROVIDER - NSDCFUADDAPPT_GEN_ALL_CORE_FT
Follow up with providers at [facility] for further management of your duodenal adenocarcinoma.

## 2023-09-28 NOTE — DIETITIAN INITIAL EVALUATION ADULT - SIGNS/SYMPTOMS
as evidenced by pt meeting <50% estimated nutrient needs >5 days and >5% wt loss x ~1 month as evidenced by recent diagnosis of neoplastic duodenal cancer.

## 2023-09-29 ENCOUNTER — TRANSCRIPTION ENCOUNTER (OUTPATIENT)
Age: 73
End: 2023-09-29

## 2023-09-29 VITALS
OXYGEN SATURATION: 98 % | HEART RATE: 73 BPM | SYSTOLIC BLOOD PRESSURE: 136 MMHG | TEMPERATURE: 99 F | DIASTOLIC BLOOD PRESSURE: 62 MMHG | RESPIRATION RATE: 18 BRPM

## 2023-09-29 LAB
GLUCOSE BLDC GLUCOMTR-MCNC: 186 MG/DL — HIGH (ref 70–99)
GLUCOSE BLDC GLUCOMTR-MCNC: 202 MG/DL — HIGH (ref 70–99)

## 2023-09-29 PROCEDURE — 99239 HOSP IP/OBS DSCHRG MGMT >30: CPT

## 2023-09-29 RX ORDER — AMLODIPINE BESYLATE 2.5 MG/1
5 TABLET ORAL DAILY
Refills: 0 | Status: DISCONTINUED | OUTPATIENT
Start: 2023-09-29 | End: 2023-09-29

## 2023-09-29 RX ORDER — AMLODIPINE BESYLATE 2.5 MG/1
1 TABLET ORAL
Refills: 0 | DISCHARGE

## 2023-09-29 RX ORDER — NYSTATIN CREAM 100000 [USP'U]/G
1 CREAM TOPICAL
Qty: 0 | Refills: 0 | DISCHARGE
Start: 2023-09-29

## 2023-09-29 RX ADMIN — Medication 2: at 13:13

## 2023-09-29 RX ADMIN — GABAPENTIN 300 MILLIGRAM(S): 400 CAPSULE ORAL at 05:18

## 2023-09-29 RX ADMIN — Medication 1 TABLET(S): at 12:10

## 2023-09-29 RX ADMIN — NYSTATIN CREAM 1 APPLICATION(S): 100000 CREAM TOPICAL at 17:30

## 2023-09-29 RX ADMIN — Medication 2 UNIT(S): at 09:29

## 2023-09-29 RX ADMIN — CHLORHEXIDINE GLUCONATE 1 APPLICATION(S): 213 SOLUTION TOPICAL at 05:18

## 2023-09-29 RX ADMIN — Medication 2 UNIT(S): at 13:14

## 2023-09-29 RX ADMIN — NYSTATIN CREAM 1 APPLICATION(S): 100000 CREAM TOPICAL at 05:17

## 2023-09-29 RX ADMIN — PANTOPRAZOLE SODIUM 40 MILLIGRAM(S): 20 TABLET, DELAYED RELEASE ORAL at 06:10

## 2023-09-29 RX ADMIN — Medication 1: at 09:29

## 2023-09-29 NOTE — SWALLOW BEDSIDE ASSESSMENT ADULT - SWALLOW EVAL: DIAGNOSIS
Consult received and appreciated. Chart reviewed. Attempted to see pt for swallow evaluation. However, as per d/w Dr. Brice, Pt has been tolerating a soft and bite-sized diet without difficulty, is planned for d/c this afternoon, and no swallow evaluation is warranted at this time. Will not actively follow at this time. Please reconsult if plan changes and clinically indicated.

## 2023-09-29 NOTE — PROGRESS NOTE ADULT - PROBLEM SELECTOR PLAN 5
- Pt on carvedilol 12.5mg BID, amlodipine 5 mg   - Restarted amlodipine 5mg 9/29, c/t hold carvedilol - Pt on carvedilol 12.5mg BID, amlodipine 5 mg   - Hold home meds ISO fall

## 2023-09-29 NOTE — DISCHARGE NOTE NURSING/CASE MANAGEMENT/SOCIAL WORK - NSDCPEFALRISK_GEN_ALL_CORE
For information on Fall & Injury Prevention, visit: [email or web address] OR  [email or web address] OR  https://www.cdc.gov/steadi/patient.html

## 2023-09-29 NOTE — PROGRESS NOTE ADULT - ATTENDING COMMENTS
# fall  # seizure activity  # duodenal CA s/p stent  # transaminitis    - pt with fall/head trauma/LOC at rehab  - witnessed seizure activity in ED s/p keppra load  - appreciate neuro recs; obtain EEG and pending the result, will determine further course of keppra  - fall likely in setting of deconditioning but will complete workups including ID and cards  - infectious workups so far negative  - TTE with normal LV function  - RUQ sonogram with intact drainage; monitor drainage output  - PT consult appreciated    Cleo Bob MD  Division of Hospital Medicine  Contact via [employer] Teams  Office: [phone]
# fall  # seizure activity  # duodenal CA s/p stent  # transaminitis  # DANA    - pt with fall/head trauma/LOC at rehab  - witnessed seizure activity in ED s/p keppra load  - appreciate neuro recs; EEG without epileptiform; dc'ed keppra  - fall likely in setting of deconditioning but will complete workups including ID and cards  - infectious workups so far negative  - TTE with normal LV function  - RUQ sonogram with intact drainage; monitor drainage output  - PT consult appreciated: JAYNE VIDES on board for [facility] placement  dc planning     Cleo Bob MD  Division of Hospital Medicine  Contact via [employer] Teams  Office: [phone]
# fall  # seizure activity  # duodenal CA s/p stent  # transaminitis    - pt with fall/head trauma/LOC at rehab  - witnessed seizure activity in ED s/p keppra load  - appreciate neuro recs; obtain EEG; dc'ed keppra  - fall likely in setting of deconditioning but will complete workups including ID and cards  - infectious workups so far negative  - TTE with normal LV function  - RUQ sonogram with intact drainage; monitor drainage output  - PT consult appreciated: JAYNE VIDES on board for [facility] placement  dc planning once EEG done    Cleo Bob MD  Division of Hospital Medicine  Contact via [employer] Teams  Office: [phone]
# fall  # seizure activity  # duodenal CA s/p stent  # transaminitis  # DANA    - pt with fall/head trauma/LOC at rehab  - witnessed seizure activity in ED s/p keppra load  - appreciate neuro recs; EEG without epileptiform; dc'ed keppra  - fall likely in setting of deconditioning but will complete workups including ID and cards  - infectious workups so far negative  - DANA with SCr 2.16 this morning; bladder scan, urine studies and gentle IVF hydration  - TTE with normal LV function  - RUQ sonogram with intact drainage; monitor drainage output  - PT consult appreciated: J[facility]NE    CM on board for [facility] placement  dc planning     Cleo Bob MD  Division of Hospital Medicine  Contact via [employer] Teams  Office: [phone]

## 2023-09-29 NOTE — PROGRESS NOTE ADULT - PROBLEM SELECTOR PLAN 1
- Pt w/ <5 min of RUE contraction and shaking in ED with unresponsiveness; no seizure history in past, but hit head on bathroom floor at rehab facility   - Keppra loaded (1g) following which pt returned to baseline  - per neuro, RUE shaking, positional dizziness and syncope likely related to vasovagal syncope, b/l UE tremors likely essential tremors  - Keppra d/c given symptoms resolved and RUE tremors are chronic, per patient  - EEG wnl with no epileptiform activity, unlikely seizure activity   - f/u and metabolic labs: TSH, free T4, Vitamin B1, B6, B12, D 25-hydroxy, folate, copper - negative thusfar  - orthostatics (+) 9/27, (+) 9/28

## 2023-09-29 NOTE — PROGRESS NOTE ADULT - SUBJECTIVE AND OBJECTIVE BOX
Lynsey Brice MD  PGY-1      Patient is a 72y old  Female who presents with a chief complaint of Fall, c/f seizures (26 Sep 2023 11:50)      SUBJECTIVE / OVERNIGHT EVENTS: No acute events overnight. Cough improving. Patient at this time denies fevers, chills, CP, SOB, nausea, vomiting, diarrhea, constipation, dysuria. Patient seen and examined at bedside.      MEDICATIONS  (STANDING):  atorvastatin 80 milliGRAM(s) Oral at bedtime  chlorhexidine 4% Liquid 1 Application(s) Topical <User Schedule>  enoxaparin Injectable 40 milliGRAM(s) SubCutaneous every 24 hours  gabapentin 300 milliGRAM(s) Oral two times a day  influenza  Vaccine (HIGH DOSE) 0.7 milliLiter(s) IntraMuscular once  insulin glargine Injectable (LANTUS) 13 Unit(s) SubCutaneous at bedtime  insulin lispro (ADMELOG) corrective regimen sliding scale   SubCutaneous three times a day before meals  insulin lispro (ADMELOG) corrective regimen sliding scale   SubCutaneous at bedtime  insulin lispro Injectable (ADMELOG) 2 Unit(s) SubCutaneous three times a day before meals  pantoprazole    Tablet 40 milliGRAM(s) Oral before breakfast    MEDICATIONS  (PRN):  acetaminophen     Tablet .. 650 milliGRAM(s) Oral every 6 hours PRN Temp greater or equal to 38C (100.4F), Mild Pain (1 - 3)  aluminum hydroxide/magnesium hydroxide/simethicone Suspension 30 milliLiter(s) Oral every 4 hours PRN Dyspepsia  melatonin 3 milliGRAM(s) Oral at bedtime PRN Insomnia  ondansetron Injectable 4 milliGRAM(s) IV Push every 8 hours PRN Nausea and/or Vomiting      CAPILLARY BLOOD GLUCOSE    POCT Blood Glucose.: 187 mg/dL (27 Sep 2023 21:32)  POCT Blood Glucose.: 202 mg/dL (27 Sep 2023 17:33)  POCT Blood Glucose.: 201 mg/dL (27 Sep 2023 12:17)  POCT Blood Glucose.: 172 mg/dL (27 Sep 2023 08:23)    I&O's Summary    27 Sep 2023 07:01  -  28 Sep 2023 07:00  --------------------------------------------------------  IN: 1340 mL / OUT: 3350 mL / NET: -2010 mL      PHYSICAL EXAM:    Vital Signs Last 24 Hrs  T(C): 37.3 (29 Sep 2023 05:05), Max: 37.3 (29 Sep 2023 05:05)  T(F): 99.2 (29 Sep 2023 05:05), Max: 99.2 (29 Sep 2023 05:05)  HR: 72 (29 Sep 2023 05:05) (70 - 73)  BP: 135/66 (29 Sep 2023 05:05) (135/66 - 140/71)  RR: 18 (29 Sep 2023 05:05) (18 - 18)  SpO2: 97% (29 Sep 2023 05:05) (95% - 97%)    Parameters below as of 29 Sep 2023 05:05  Patient On (Oxygen Delivery Method): room air    GENERAL: No acute distress, well-developed  HEAD:  Atraumatic, Normocephalic  EYES: EOMI, PERRLA, conjunctiva and sclera clear  NECK: Supple, no lymphadenopathy, no JVD  CHEST/LUNG: CTAB; No wheezes, rales, or rhonchi  HEART: Regular rate and rhythm; No murmurs, rubs, or gallops  ABDOMEN: Soft, non-tender, non-distended; normal bowel sounds, no organomegaly. Bile duct drain in place, draining large volume of dark orange fluid  EXTREMITIES:  2+ peripheral pulses b/l, No clubbing, cyanosis, or edema  NEUROLOGY: A&O x 3, no focal deficits  SKIN: No rashes or lesions    LABS:                          10.4   9.86  )-----------( 264      ( 28 Sep 2023 08:30 )             31.8       09-26    133<L>  |  96  |  37<H>  ----------------------------<  165<H>  3.9   |  22  |  1.93<H>    Ca    9.6      26 Sep 2023 11:10  Phos  4.2     09-26  Mg     2.0     09-26          Urinalysis Basic - ( 26 Sep 2023 11:10 )    Color: x / Appearance: x / SG: x / pH: x  Gluc: 165 mg/dL / Ketone: x  / Bili: x / Urobili: x   Blood: x / Protein: x / Nitrite: x   Leuk Esterase: x / RBC: x / WBC x   Sq Epi: x / Non Sq Epi: x / Bacteria: x      Culture - Blood (collected 25 Sep 2023 22:32)  Source: .Blood Blood-Peripheral  Preliminary Report (27 Sep 2023 02:02):    No growth at 24 hours    Culture - Blood (collected 25 Sep 2023 12:47)  Source: .Blood Blood-Peripheral  Preliminary Report (26 Sep 2023 17:02):    No growth at 24 hours        RADIOLOGY & ADDITIONAL TESTS:

## 2023-09-29 NOTE — PROGRESS NOTE ADULT - ASSESSMENT
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  Pt afebrile, normotensive and reassuring imaging studies, infectious w/u unremarkable. S/p EEG per neuro recs, also wnl with no epileptiform activity. Fall likely 2/2 weakness ISO new cancer and deconditioning.

## 2023-09-29 NOTE — PROGRESS NOTE ADULT - PROBLEM SELECTOR PLAN 3
- [place] [facility] [place]
- [place] [facility] [place]
- Recently dx at [facility] [place], s/p duodenal stent
- Recently dx at [facility] [place], s/p duodenal stent

## 2023-09-29 NOTE — DISCHARGE NOTE NURSING/CASE MANAGEMENT/SOCIAL WORK - PATIENT PORTAL LINK FT
You can access the FollowMyHealth Patient Portal offered by [facility] by registering at the following website: [email or web address]. By joining [employer]’s FollowMyHealth portal, you will also be able to view your health information using other applications (apps) compatible with our system.

## 2023-09-29 NOTE — PROGRESS NOTE ADULT - PROBLEM SELECTOR PLAN 6
- Hold home Janumet   - Per paperwork from rehab, 15 U basal insulin and 2U pre-meal, however family said that she was on 25-30U basal insulin  - 13 U lantus and 2U premeal along with low dose SSI pending po intake  - FS qhs and before meals
- Hold home Rishabhumet   - Per paperwork from rehab, 15 U basal insulin and 2U pre-meal, however family said that she was on 25-30U basal insulin  - Will start 13 U lantus and 2U premeal along with low dose SSI pending po intake  - FS qhs and before meals
- Hold home Janumet   - Per paperwork from rehab, 15 U basal insulin and 2U pre-meal, however family said that she was on 25-30U basal insulin  - 13 U lantus and 2U premeal along with low dose SSI pending po intake  - FS qhs and before meals
- Hold home Janumet   - Per paperwork from rehab, 15 U basal insulin and 2U pre-meal, however family said that she was on 25-30U basal insulin  - 13 U lantus and 2U premeal along with low dose SSI pending po intake  - FS qhs and before meals

## 2023-09-29 NOTE — PROGRESS NOTE ADULT - PROBLEM SELECTOR PLAN 2
- WBC count 11.63, Procal 5.4, and lactate 2.1 on VBG however no fever or clear source of infection   - RUQ ultrasound to r/o biliary infection; per son, biliary drain is putting out significantly less fluid than normal. Flush drain BID  - No evidence of intraabdominal/intrapelvic infection on CT  - BCx, UCx  - Will broaden infectious w/u if pt's presentation worsens, however based on clinical picture low concern for infection and no need for empiric antibiotic treatment at this time   - TTE wnl
- WBC count 11.63, Procal 5.4, and lactate 2.1 on VBG however no fever or clear source of infection   - RUQ ultrasound to r/o biliary infection; per son, biliary drain is putting out significantly less fluid than normal. Flush drain BID  - No evidence of intraabdominal/intrapelvic infection on CT  - BCx, UCx  - Will broaden infectious w/u if pt's presentation worsens, however based on clinical picture low concern for infection and no need for empiric antibiotic treatment at this time   - S/p Levaquin in ED?  - TTE wnl
- WBC count 11.63, Procal 5.4, and lactate 2.1 on VBG however no fever or clear source of infection   - RUQ ultrasound to r/o biliary infection; per son, biliary drain is putting out significantly less fluid than normal. Flush drain BID  - No evidence of intraabdominal/intrapelvic infection on CT  - BCx, UCx  - Will broaden infectious w/u if pt's presentation worsens, however based on clinical picture low concern for infection and no need for empiric antibiotic treatment at this time   - S/p Levaquin in ED?
- WBC count 11.63, Procal 5.4, and lactate 2.1 on VBG however no fever or clear source of infection   - RUQ ultrasound to r/o biliary infection; per son, biliary drain is putting out significantly less fluid than normal. Flush drain BID  - No evidence of intraabdominal/intrapelvic infection on CT  - BCx, UCx  - Will broaden infectious w/u if pt's presentation worsens, however based on clinical picture low concern for infection and no need for empiric antibiotic treatment at this time   - TTE wnl

## 2023-09-29 NOTE — PROGRESS NOTE ADULT - PROBLEM SELECTOR PROBLEM 4
Obstruction of biliary tree

## 2023-09-29 NOTE — PROGRESS NOTE ADULT - REASON FOR ADMISSION
Fall, c/f seizures

## 2023-09-29 NOTE — PROGRESS NOTE ADULT - NUTRITIONAL ASSESSMENT
This patient has been assessed with a concern for Malnutrition and has been determined to have a diagnosis/diagnoses of Severe protein-calorie malnutrition and Morbid obesity (BMI > 40).    This patient is being managed with:   Diet Soft and Bite Sized-  Consistent Carbohydrate {Evening Snack} (CSTCHOSN)  DASH/TLC {Sodium & Cholesterol Restricted} (DASH)  Supplement Feeding Modality:  Oral  Glucerna Shake Cans or Servings Per Day:  1       Frequency:  Daily  Entered: Sep 28 2023  4:56PM

## 2023-09-29 NOTE — PROGRESS NOTE ADULT - PROVIDER SPECIALTY LIST ADULT
Neurology
Internal Medicine

## 2023-09-29 NOTE — SWALLOW BEDSIDE ASSESSMENT ADULT - SLP PERTINENT HISTORY OF CURRENT PROBLEM
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 Pt afebrile, normotensive and reassuring imaging studies, infectious w/u unremarkable. S/p EEG per neuro recs, also wnl with no epileptiform activity. Fall likely 2/2 weakness ISO new cancer and deconditioning. Leukocytosis with WBC count 11.63, Procal 5.4, and lactate 2.1 on VBG however no fever or clear source of infection; RUQ ultrasound to r/o biliary infection; per son, biliary drain is putting out significantly less fluid than normal. No evidence of intraabdominal/intrapelvic infection on CT.

## 2023-09-29 NOTE — DISCHARGE NOTE NURSING/CASE MANAGEMENT/SOCIAL WORK - NSDCFUADDAPPT_GEN_ALL_CORE_FT
Follow up with providers at [facility] for further management of your duodenal adenocarcinoma.

## 2023-09-29 NOTE — PROGRESS NOTE ADULT - PROBLEM SELECTOR PLAN 8
- DVT PPx: Lovenox 40 mg   - PT: Consult placed  - Diet: Carb consistent, soft and bite sized  - GI: Simethicone q4
- DVT PPx: Lovenox 40 mg   - PT: Consult placed  - Diet: Carb consistent, soft and bite sized; per RD, pt does not like soft and bite sized food; swallow eval pending to determine best diet   - GI: Simethicone q4  - GOC: DNR/DNI  - Dispo: to new [facility] in [place] per family request, CM aware
- DVT PPx: Lovenox 40 mg   - PT: Consult placed  - Diet: Carb consistent, soft and bite sized  - GI: Simethicone q4
- DVT PPx: Lovenox 40 mg   - PT: Consult placed  - Diet: Carb consistent, soft and bite sized  - GI: Simethicone q4  - GOC: DNR/DNI  - Dispo: to new [facility] in [place] per family request, CM aware

## 2023-09-29 NOTE — PROGRESS NOTE ADULT - PROBLEM SELECTOR PROBLEM 1
Observed seizure-like activity

## 2023-09-30 LAB
CULTURE RESULTS: SIGNIFICANT CHANGE UP
SPECIMEN SOURCE: SIGNIFICANT CHANGE UP
VIT B1 SERPL-MCNC: 124.5 NMOL/L — SIGNIFICANT CHANGE UP (ref 66.5–200)

## 2023-10-01 LAB
CULTURE RESULTS: SIGNIFICANT CHANGE UP
SPECIMEN SOURCE: SIGNIFICANT CHANGE UP

## 2023-10-03 LAB — COPPER SERPL-MCNC: 95 UG/DL — SIGNIFICANT CHANGE UP (ref 80–158)

## 2023-10-05 LAB — PYRIDOXAL PHOS SERPL-MCNC: SIGNIFICANT CHANGE UP UG/L

## 2023-11-13 PROCEDURE — 86850 RBC ANTIBODY SCREEN: CPT

## 2023-11-13 PROCEDURE — 82607 VITAMIN B-12: CPT

## 2023-11-13 PROCEDURE — 82947 ASSAY GLUCOSE BLOOD QUANT: CPT

## 2023-11-13 PROCEDURE — 82525 ASSAY OF COPPER: CPT

## 2023-11-13 PROCEDURE — 87086 URINE CULTURE/COLONY COUNT: CPT

## 2023-11-13 PROCEDURE — 85014 HEMATOCRIT: CPT

## 2023-11-13 PROCEDURE — 83935 ASSAY OF URINE OSMOLALITY: CPT

## 2023-11-13 PROCEDURE — 83735 ASSAY OF MAGNESIUM: CPT

## 2023-11-13 PROCEDURE — 93306 TTE W/DOPPLER COMPLETE: CPT

## 2023-11-13 PROCEDURE — 85027 COMPLETE CBC AUTOMATED: CPT

## 2023-11-13 PROCEDURE — 84300 ASSAY OF URINE SODIUM: CPT

## 2023-11-13 PROCEDURE — 84132 ASSAY OF SERUM POTASSIUM: CPT

## 2023-11-13 PROCEDURE — 82962 GLUCOSE BLOOD TEST: CPT

## 2023-11-13 PROCEDURE — 82803 BLOOD GASES ANY COMBINATION: CPT

## 2023-11-13 PROCEDURE — 87641 MR-STAPH DNA AMP PROBE: CPT

## 2023-11-13 PROCEDURE — 84443 ASSAY THYROID STIM HORMONE: CPT

## 2023-11-13 PROCEDURE — 99291 CRITICAL CARE FIRST HOUR: CPT

## 2023-11-13 PROCEDURE — 84436 ASSAY OF TOTAL THYROXINE: CPT

## 2023-11-13 PROCEDURE — 84425 ASSAY OF VITAMIN B-1: CPT

## 2023-11-13 PROCEDURE — 0225U NFCT DS DNA&RNA 21 SARSCOV2: CPT

## 2023-11-13 PROCEDURE — 82306 VITAMIN D 25 HYDROXY: CPT

## 2023-11-13 PROCEDURE — 83690 ASSAY OF LIPASE: CPT

## 2023-11-13 PROCEDURE — 96374 THER/PROPH/DIAG INJ IV PUSH: CPT

## 2023-11-13 PROCEDURE — 85610 PROTHROMBIN TIME: CPT

## 2023-11-13 PROCEDURE — 82330 ASSAY OF CALCIUM: CPT

## 2023-11-13 PROCEDURE — 71260 CT THORAX DX C+: CPT | Mod: MA

## 2023-11-13 PROCEDURE — 82746 ASSAY OF FOLIC ACID SERUM: CPT

## 2023-11-13 PROCEDURE — 85018 HEMOGLOBIN: CPT

## 2023-11-13 PROCEDURE — 85025 COMPLETE CBC W/AUTO DIFF WBC: CPT

## 2023-11-13 PROCEDURE — 82550 ASSAY OF CK (CPK): CPT

## 2023-11-13 PROCEDURE — 84295 ASSAY OF SERUM SODIUM: CPT

## 2023-11-13 PROCEDURE — 84540 ASSAY OF URINE/UREA-N: CPT

## 2023-11-13 PROCEDURE — 82652 VIT D 1 25-DIHYDROXY: CPT

## 2023-11-13 PROCEDURE — 84145 PROCALCITONIN (PCT): CPT

## 2023-11-13 PROCEDURE — 86900 BLOOD TYPING SEROLOGIC ABO: CPT

## 2023-11-13 PROCEDURE — 97162 PT EVAL MOD COMPLEX 30 MIN: CPT

## 2023-11-13 PROCEDURE — 94640 AIRWAY INHALATION TREATMENT: CPT

## 2023-11-13 PROCEDURE — 83605 ASSAY OF LACTIC ACID: CPT

## 2023-11-13 PROCEDURE — 70450 CT HEAD/BRAIN W/O DYE: CPT | Mod: MA

## 2023-11-13 PROCEDURE — 80048 BASIC METABOLIC PNL TOTAL CA: CPT

## 2023-11-13 PROCEDURE — 84207 ASSAY OF VITAMIN B-6: CPT

## 2023-11-13 PROCEDURE — 95816 EEG AWAKE AND DROWSY: CPT

## 2023-11-13 PROCEDURE — 72125 CT NECK SPINE W/O DYE: CPT | Mod: MA

## 2023-11-13 PROCEDURE — 84484 ASSAY OF TROPONIN QUANT: CPT

## 2023-11-13 PROCEDURE — 74177 CT ABD & PELVIS W/CONTRAST: CPT | Mod: MA

## 2023-11-13 PROCEDURE — 86901 BLOOD TYPING SEROLOGIC RH(D): CPT

## 2023-11-13 PROCEDURE — 36415 COLL VENOUS BLD VENIPUNCTURE: CPT

## 2023-11-13 PROCEDURE — 84100 ASSAY OF PHOSPHORUS: CPT

## 2023-11-13 PROCEDURE — 82570 ASSAY OF URINE CREATININE: CPT

## 2023-11-13 PROCEDURE — 80053 COMPREHEN METABOLIC PANEL: CPT

## 2023-11-13 PROCEDURE — 85730 THROMBOPLASTIN TIME PARTIAL: CPT

## 2023-11-13 PROCEDURE — 97116 GAIT TRAINING THERAPY: CPT

## 2023-11-13 PROCEDURE — 87640 STAPH A DNA AMP PROBE: CPT

## 2023-11-13 PROCEDURE — 96375 TX/PRO/DX INJ NEW DRUG ADDON: CPT

## 2023-11-13 PROCEDURE — 76705 ECHO EXAM OF ABDOMEN: CPT

## 2023-11-13 PROCEDURE — 82435 ASSAY OF BLOOD CHLORIDE: CPT

## 2023-11-13 PROCEDURE — 87040 BLOOD CULTURE FOR BACTERIA: CPT

## 2023-11-13 PROCEDURE — 86803 HEPATITIS C AB TEST: CPT

## 2023-11-13 PROCEDURE — 97110 THERAPEUTIC EXERCISES: CPT

## 2024-01-18 NOTE — ED ADULT TRIAGE NOTE - CAPILLARY BLOOD GLUCOSE (MG/DL) (70-99)
Deaclaude Zhou Thank you for visiting the Pulmonary clinic today! We discussed the followin) Sarcoidosis   Please follow up in clinic in 3 months, please let us know if you have any worsening dyspnea, cough or breathing issues before then  Will repeat breathing test in 2024 and CAT scan of the chest in 2024     2) Asthma  Continue Symbicort 2 puffs twice daily and albuterol as needed     3) Weight loss   Recommend seeing nutrition services and please talk to primary care about weight loss medication like Ozempic      For scheduling purposes:     Call [phone] to schedule a breathing or a walking test      Call [phone] to schedule  EKG's, Echocardiograms and Cardiopulmonary Stress Tests.     Call [phone] to schedule Radiology tests such as Nuclear Medicine Stress Tests, CT Scans, and MRI's.     Should you have any questions Please Call my assistant Jeremiah Rodriguez at  or our pulmonary nurse Kate Akbar at [phone]     [phone]

## 2024-05-29 NOTE — ED ADULT TRIAGE NOTE - SPO2 (%)
What Type Of Note Output Would You Prefer (Optional)?: Standard Output
Hpi Title: Evaluation of Skin Lesions
Additional History: Patient reports trauma to the right forearm. Patient applied iodine to the affected area on the arm.
98

## 2024-08-22 NOTE — ED PROVIDER NOTE - EKG #1 DATE/TIME
Bill For Surgical Tray: no Billing Type: Third-Party Bill Performing Laboratory: [id number] Expected Date Of Service: 08/22/2024 28-Jan-2017 23:49

## 2024-11-23 NOTE — ED ADULT NURSE NOTE - NSFALLRSKASSESASSIST_ED_ALL_ED
jaundiced or pale.      Findings: No bruising, erythema, lesion or rash.   Neurological:      General: No focal deficit present.      Mental Status: She is alert and oriented to person, place, and time. Mental status is at baseline.   Psychiatric:         Mood and Affect: Mood normal.         Behavior: Behavior normal.         Thought Content: Thought content normal.         Judgment: Judgment normal.          FORMAL DIAGNOSTIC RESULTS     RADIOLOGY: Interpretation per the Radiologist below, if available at the time of this note (none if blank):    No orders to display       LABS: (none if blank)  Labs Reviewed   URINE WITH REFLEXED MICRO - Abnormal; Notable for the following components:       Result Value    Specific Gravity, UA >1.030 (*)     Protein, UA TRACE (*)     Leukocyte Esterase, Urine MODERATE (*)     Character, Urine TURBID (*)     All other components within normal limits   KOH (SKIN,HAIR,NAILS)    Narrative:     Source: vaginal non-OB patient       Site:           Current Antibiotics: not stated   WET PREP, GENITAL    Narrative:     Source: vaginal non-OB patient       Site:           Current Antibiotics: not stated   CULTURE, GENITAL    Narrative:     Source: vaginal non-OB patient       Site:           Current Antibiotics: not stated   C.TRACHOMATIS N.GONORRHOEAE DNA   CULTURE, REFLEXED, URINE    Narrative:     Source: urine, clean catch       Site: clean void          Current Antibiotics: not stated   PREGNANCY, URINE       (Any cultures that may have been sent were not resulted at the time of this patient visit)    MEDICAL DECISION MAKING     1) Number and Complexity of Problems            Problem List This Visit:         Chief Complaint   Patient presents with    Vaginal Itching          Differential Diagnosis includes (but not limited to):  pregnancy, vaginitis (trichomonas, candidiasis, bacterial vaginosis), UTI, chlamydia/gonorrhea, atropic vaginitis, foreign body, HIV, trauma, primary    metroNIDAZOLE (FLAGYL) 500 MG tablet Take 1 tablet by mouth 2 times daily for 14 days, Disp-28 tablet, R-0Normal               FINAL IMPRESSION     Final diagnoses:   Vulvar pruritus     1. Vulvar pruritus        DISPOSITION / PLAN   DISPOSITION Decision To Discharge 11/22/2024 09:03:54 PM           OUTPATIENT FOLLOW UP THE PATIENT:  Sue Garcia, APRN - CNP  [address] [facility] [address]  [phone]    Schedule an appointment as soon as possible for a visit   for follow up        This transcription was electronically signed. Parts of this transcriptions may have been dictated by use of voice recognition software and electronically transcribed, and parts may have been transcribed with the assistance of an ED scribe. The transcription may contain errors not detected in proofreading. Please refer to my supervising physician's documentation if my documentation differs.    Electronically Signed: Holger Wren MD, 11/22/24, 9:25 PM       no

## 2024-12-26 NOTE — PATIENT PROFILE ADULT. - NS PRO AD NO ADVANCE DIRECTIVE
"Jevity 1.5; Continuous at 70 cc/hour plus 150 cc Water; Every 4 hours  Allow \"Pleasure Feedings' of limited amounts of smooth, moist pureed food and thin liquid   Clean mouth after all meals.  "
No